# Patient Record
Sex: FEMALE | Race: WHITE | NOT HISPANIC OR LATINO
[De-identification: names, ages, dates, MRNs, and addresses within clinical notes are randomized per-mention and may not be internally consistent; named-entity substitution may affect disease eponyms.]

---

## 2017-03-30 PROBLEM — M70.61 GREATER TROCHANTERIC BURSITIS OF RIGHT HIP: Status: ACTIVE | Noted: 2017-03-30

## 2017-08-01 PROBLEM — S76.012A HIP STRAIN, LEFT, INITIAL ENCOUNTER: Status: ACTIVE | Noted: 2017-08-01

## 2018-02-08 PROCEDURE — 83970 ASSAY OF PARATHORMONE: CPT | Performed by: FAMILY MEDICINE

## 2018-02-08 PROCEDURE — 82533 TOTAL CORTISOL: CPT | Performed by: FAMILY MEDICINE

## 2018-02-23 PROBLEM — I70.0 ATHEROSCLEROSIS OF AORTA: Status: ACTIVE | Noted: 2018-02-23

## 2018-02-23 PROBLEM — I70.0 ATHEROSCLEROSIS OF AORTA (HCC): Status: ACTIVE | Noted: 2018-02-23

## 2018-02-23 PROBLEM — M50.30 DDD (DEGENERATIVE DISC DISEASE), CERVICAL: Status: ACTIVE | Noted: 2018-02-23

## 2018-02-23 PROBLEM — M54.41 ACUTE RIGHT-SIDED LOW BACK PAIN WITH RIGHT-SIDED SCIATICA: Status: ACTIVE | Noted: 2018-02-23

## 2018-03-06 PROBLEM — M54.2 NECK PAIN: Status: ACTIVE | Noted: 2018-03-06

## 2018-03-06 PROBLEM — M25.511 ACUTE PAIN OF RIGHT SHOULDER: Status: ACTIVE | Noted: 2018-03-06

## 2018-03-09 PROBLEM — G89.29 CHRONIC RIGHT-SIDED LOW BACK PAIN WITHOUT SCIATICA: Status: ACTIVE | Noted: 2018-03-09

## 2018-03-09 PROBLEM — M54.50 CHRONIC RIGHT-SIDED LOW BACK PAIN WITHOUT SCIATICA: Status: ACTIVE | Noted: 2018-03-09

## 2018-06-05 PROCEDURE — 82607 VITAMIN B-12: CPT | Performed by: FAMILY MEDICINE

## 2018-06-07 PROCEDURE — 87427 SHIGA-LIKE TOXIN AG IA: CPT | Performed by: FAMILY MEDICINE

## 2018-06-07 PROCEDURE — 87493 C DIFF AMPLIFIED PROBE: CPT | Performed by: FAMILY MEDICINE

## 2018-06-07 PROCEDURE — 89055 LEUKOCYTE ASSESSMENT FECAL: CPT | Performed by: FAMILY MEDICINE

## 2018-06-07 PROCEDURE — 87046 STOOL CULTR AEROBIC BACT EA: CPT | Performed by: FAMILY MEDICINE

## 2018-06-07 PROCEDURE — 87045 FECES CULTURE AEROBIC BACT: CPT | Performed by: FAMILY MEDICINE

## 2018-06-07 PROCEDURE — 87338 HPYLORI STOOL AG IA: CPT | Performed by: FAMILY MEDICINE

## 2018-07-11 PROBLEM — R19.7 DIARRHEA, UNSPECIFIED TYPE: Status: ACTIVE | Noted: 2018-07-11

## 2019-01-11 PROCEDURE — 87086 URINE CULTURE/COLONY COUNT: CPT | Performed by: INTERNAL MEDICINE

## 2019-03-01 PROBLEM — Z92.89 HOSPITALIZATION WITHIN LAST 30 DAYS: Status: ACTIVE | Noted: 2019-03-01

## 2019-03-01 PROBLEM — R07.2 PRECORDIAL PAIN: Status: ACTIVE | Noted: 2019-03-01

## 2019-04-19 PROBLEM — R25.1 TREMOR: Status: ACTIVE | Noted: 2019-04-19

## 2019-04-19 PROBLEM — J06.9 URTI (ACUTE UPPER RESPIRATORY INFECTION): Status: ACTIVE | Noted: 2019-04-19

## 2019-04-19 PROBLEM — F33.0 MILD EPISODE OF RECURRENT MAJOR DEPRESSIVE DISORDER: Status: ACTIVE | Noted: 2019-04-19

## 2019-04-19 PROBLEM — F33.0 MILD EPISODE OF RECURRENT MAJOR DEPRESSIVE DISORDER (HCC): Status: ACTIVE | Noted: 2019-04-19

## 2019-05-31 PROBLEM — N18.4 STAGE 4 CHRONIC KIDNEY DISEASE (HCC): Status: ACTIVE | Noted: 2019-05-31

## 2019-07-12 PROBLEM — J01.01 ACUTE RECURRENT MAXILLARY SINUSITIS: Status: ACTIVE | Noted: 2019-07-12

## 2019-07-12 PROBLEM — R29.818 ROMBERG'S TEST POSITIVE: Status: ACTIVE | Noted: 2019-07-12

## 2019-07-12 PROBLEM — R42 DIZZINESS: Status: ACTIVE | Noted: 2019-07-12

## 2019-10-09 PROBLEM — H81.11 BPPV (BENIGN PAROXYSMAL POSITIONAL VERTIGO), RIGHT: Status: ACTIVE | Noted: 2019-10-09

## 2019-10-09 PROBLEM — H90.A32 MIXED CONDUCTIVE AND SENSORINEURAL HEARING LOSS OF LEFT EAR WITH RESTRICTED HEARING OF RIGHT EAR: Status: ACTIVE | Noted: 2019-10-09

## 2021-09-03 ENCOUNTER — TELEPHONE (OUTPATIENT)
Dept: SCHEDULING | Age: 76
End: 2021-09-03

## 2021-11-03 PROBLEM — F33.41 DEPRESSION, MAJOR, RECURRENT, IN PARTIAL REMISSION: Status: ACTIVE | Noted: 2021-11-03

## 2021-11-03 PROBLEM — F13.20: Status: ACTIVE | Noted: 2021-11-03

## 2021-11-03 PROBLEM — F10.20 ALCOHOL DEPENDENCE, CONTINUOUS (HCC): Status: ACTIVE | Noted: 2021-11-03

## 2021-11-03 PROBLEM — F33.41 DEPRESSION, MAJOR, RECURRENT, IN PARTIAL REMISSION (HCC): Status: ACTIVE | Noted: 2021-11-03

## 2022-08-04 ENCOUNTER — APPOINTMENT (OUTPATIENT)
Dept: URBAN - METROPOLITAN AREA CLINIC 247 | Age: 77
Setting detail: DERMATOLOGY
End: 2022-08-04

## 2022-08-04 DIAGNOSIS — D22 MELANOCYTIC NEVI: ICD-10-CM

## 2022-08-04 DIAGNOSIS — D18.0 HEMANGIOMA: ICD-10-CM

## 2022-08-04 DIAGNOSIS — L82.1 OTHER SEBORRHEIC KERATOSIS: ICD-10-CM

## 2022-08-04 DIAGNOSIS — L81.4 OTHER MELANIN HYPERPIGMENTATION: ICD-10-CM

## 2022-08-04 DIAGNOSIS — Z71.89 OTHER SPECIFIED COUNSELING: ICD-10-CM

## 2022-08-04 PROBLEM — D18.01 HEMANGIOMA OF SKIN AND SUBCUTANEOUS TISSUE: Status: ACTIVE | Noted: 2022-08-04

## 2022-08-04 PROBLEM — D22.5 MELANOCYTIC NEVI OF TRUNK: Status: ACTIVE | Noted: 2022-08-04

## 2022-08-04 PROCEDURE — 99213 OFFICE O/P EST LOW 20 MIN: CPT

## 2022-08-04 PROCEDURE — OTHER COUNSELING: OTHER

## 2022-08-04 PROCEDURE — OTHER MIPS QUALITY: OTHER

## 2022-08-04 ASSESSMENT — LOCATION DETAILED DESCRIPTION DERM
LOCATION DETAILED: RIGHT MEDIAL UPPER BACK
LOCATION DETAILED: STERNUM
LOCATION DETAILED: LEFT MID-UPPER BACK
LOCATION DETAILED: LEFT SUPERIOR UPPER BACK

## 2022-08-04 ASSESSMENT — LOCATION SIMPLE DESCRIPTION DERM
LOCATION SIMPLE: RIGHT UPPER BACK
LOCATION SIMPLE: CHEST
LOCATION SIMPLE: LEFT UPPER BACK

## 2022-08-04 ASSESSMENT — LOCATION ZONE DERM: LOCATION ZONE: TRUNK

## 2022-11-03 ENCOUNTER — APPOINTMENT (OUTPATIENT)
Dept: URBAN - METROPOLITAN AREA CLINIC 247 | Age: 77
Setting detail: DERMATOLOGY
End: 2022-11-03

## 2022-11-03 DIAGNOSIS — R21 RASH AND OTHER NONSPECIFIC SKIN ERUPTION: ICD-10-CM

## 2022-11-03 DIAGNOSIS — L259 CONTACT DERMATITIS AND OTHER ECZEMA, UNSPECIFIED CAUSE: ICD-10-CM

## 2022-11-03 PROBLEM — L30.9 DERMATITIS, UNSPECIFIED: Status: ACTIVE | Noted: 2022-11-03

## 2022-11-03 PROCEDURE — 11102 TANGNTL BX SKIN SINGLE LES: CPT

## 2022-11-03 PROCEDURE — OTHER BIOPSY BY SHAVE METHOD: OTHER

## 2022-11-03 PROCEDURE — OTHER COUNSELING: OTHER

## 2022-11-03 PROCEDURE — OTHER PRESCRIPTION MEDICATION MANAGEMENT: OTHER

## 2022-11-03 PROCEDURE — 99213 OFFICE O/P EST LOW 20 MIN: CPT | Mod: 25

## 2022-11-03 PROCEDURE — OTHER MEDICATION COUNSELING: OTHER

## 2022-11-03 PROCEDURE — OTHER PRESCRIPTION: OTHER

## 2022-11-03 PROCEDURE — OTHER ADDITIONAL NOTES: OTHER

## 2022-11-03 RX ORDER — TRIAMCINOLONE ACETONIDE 1 MG/G
CREAM TOPICAL
Qty: 454 | Refills: 2 | Status: ERX | COMMUNITY
Start: 2022-11-03

## 2022-11-03 RX ORDER — PERMETHRIN 50 MG/G
CREAM TOPICAL
Qty: 60 | Refills: 1 | Status: ERX | COMMUNITY
Start: 2022-11-03

## 2022-11-03 ASSESSMENT — LOCATION SIMPLE DESCRIPTION DERM
LOCATION SIMPLE: RIGHT UPPER BACK
LOCATION SIMPLE: ABDOMEN
LOCATION SIMPLE: LEFT UPPER BACK
LOCATION SIMPLE: LEFT UPPER ARM
LOCATION SIMPLE: LEFT ANTERIOR NECK
LOCATION SIMPLE: LEFT BUTTOCK
LOCATION SIMPLE: RIGHT BUTTOCK
LOCATION SIMPLE: RIGHT UPPER ARM

## 2022-11-03 ASSESSMENT — LOCATION ZONE DERM
LOCATION ZONE: ARM
LOCATION ZONE: TRUNK
LOCATION ZONE: NECK

## 2022-11-03 ASSESSMENT — LOCATION DETAILED DESCRIPTION DERM
LOCATION DETAILED: RIGHT ANTERIOR DISTAL UPPER ARM
LOCATION DETAILED: LEFT BUTTOCK
LOCATION DETAILED: EPIGASTRIC SKIN
LOCATION DETAILED: LEFT MID-UPPER BACK
LOCATION DETAILED: LEFT INFERIOR ANTERIOR NECK
LOCATION DETAILED: LEFT ANTERIOR DISTAL UPPER ARM
LOCATION DETAILED: RIGHT BUTTOCK
LOCATION DETAILED: RIGHT INFERIOR MEDIAL UPPER BACK

## 2022-11-03 NOTE — PROCEDURE: BIOPSY BY SHAVE METHOD
Hide Additional Anticipated Plan?: No
Size Of Lesion In Cm: 0
Type Of Destruction Used: Curettage
Anesthesia Volume In Cc (Will Not Render If 0): 0.5
Biopsy Method: Personna blade
Silver Nitrate Text: The wound bed was treated with silver nitrate after the biopsy was performed.
Was A Bandage Applied: Yes
Post-Care Instructions: I reviewed with the patient in detail post-care instructions. Patient is to keep the biopsy site dry overnight, and then wash daily with a gentle cleanser. Afterwards, pat dry and apply Vaseline and bandage daily until healed. For optimal wound healing, apply SPF 30+ or keep covered until pigmentation fades.
Consent: Written consent was obtained and risks were reviewed. Patient was offered the consent document and offered time to review and ask any questions prior to signing.
Electrodesiccation Text: The wound bed was treated with electrodesiccation after the biopsy was performed.
Billing Type: Third-Party Bill
Curettage Text: The wound bed was treated with curettage after the biopsy was performed.
Electrodesiccation And Curettage Text: The wound bed was treated with electrodesiccation and curettage after the biopsy was performed.
Cryotherapy Text: The wound bed was treated with cryotherapy after the biopsy was performed.
Notification Instructions: Patient will be notified of biopsy results. However, patient instructed to call the office if not contacted within 2 weeks.
Dressing: bandage
Detail Level: Detailed
Wound Care: Petrolatum
Information: Selecting Yes will display possible errors in your note based on the variables you have selected. This validation is only offered as a suggestion for you. PLEASE NOTE THAT THE VALIDATION TEXT WILL BE REMOVED WHEN YOU FINALIZE YOUR NOTE. IF YOU WANT TO FAX A PRELIMINARY NOTE YOU WILL NEED TO TOGGLE THIS TO 'NO' IF YOU DO NOT WANT IT IN YOUR FAXED NOTE.
Biopsy Type: H and E
Depth Of Biopsy: dermis
Hemostasis: Drysol
Anesthesia Type: 1% lidocaine with epinephrine

## 2022-11-03 NOTE — PROCEDURE: PRESCRIPTION MEDICATION MANAGEMENT
Initiate Treatment: Permethrin 5% cream. Apply neck down at night and repeat in 1 week. \\nTAC 0.1% cream BID for 2 weeks.
Detail Level: Zone
Render In Strict Bullet Format?: No

## 2022-11-03 NOTE — PROCEDURE: ADDITIONAL NOTES
Additional Notes: Hands clear upon exam. Pt reports groin is clear as well.\\nPt tried OTC scabies treatment with minimal improvement.\\nAdvised to wash all clothes, blankets, and bedding with hot water the day after first permethrin treatment.\\nPt reports that her  also has a rash. She is aware that he will also need to do permethrin treatment (he has appt later today).
Render Risk Assessment In Note?: no
Detail Level: Zone

## 2024-03-19 PROBLEM — J06.9 URTI (ACUTE UPPER RESPIRATORY INFECTION): Status: RESOLVED | Noted: 2019-04-19 | Resolved: 2024-03-19

## 2024-04-18 ENCOUNTER — RX ONLY (RX ONLY)
Age: 79
End: 2024-04-18

## 2024-04-18 ENCOUNTER — APPOINTMENT (OUTPATIENT)
Dept: URBAN - METROPOLITAN AREA CLINIC 247 | Age: 79
Setting detail: DERMATOLOGY
End: 2024-04-18

## 2024-04-18 DIAGNOSIS — D18.0 HEMANGIOMA: ICD-10-CM

## 2024-04-18 DIAGNOSIS — L82.1 OTHER SEBORRHEIC KERATOSIS: ICD-10-CM

## 2024-04-18 DIAGNOSIS — L81.4 OTHER MELANIN HYPERPIGMENTATION: ICD-10-CM

## 2024-04-18 DIAGNOSIS — B35.3 TINEA PEDIS: ICD-10-CM

## 2024-04-18 DIAGNOSIS — D22 MELANOCYTIC NEVI: ICD-10-CM

## 2024-04-18 DIAGNOSIS — Z71.89 OTHER SPECIFIED COUNSELING: ICD-10-CM

## 2024-04-18 PROBLEM — D22.5 MELANOCYTIC NEVI OF TRUNK: Status: ACTIVE | Noted: 2024-04-18

## 2024-04-18 PROBLEM — D18.01 HEMANGIOMA OF SKIN AND SUBCUTANEOUS TISSUE: Status: ACTIVE | Noted: 2024-04-18

## 2024-04-18 PROCEDURE — OTHER PRESCRIPTION: OTHER

## 2024-04-18 PROCEDURE — OTHER COUNSELING: OTHER

## 2024-04-18 PROCEDURE — OTHER MEDICATION COUNSELING: OTHER

## 2024-04-18 PROCEDURE — 99214 OFFICE O/P EST MOD 30 MIN: CPT

## 2024-04-18 PROCEDURE — OTHER MIPS QUALITY: OTHER

## 2024-04-18 PROCEDURE — OTHER ADDITIONAL NOTES: OTHER

## 2024-04-18 PROCEDURE — OTHER PRESCRIPTION MEDICATION MANAGEMENT: OTHER

## 2024-04-18 RX ORDER — KETOCONAZOLE 20 MG/G
CREAM TOPICAL BID
Qty: 60 | Refills: 2 | Status: ERX | COMMUNITY
Start: 2024-04-18

## 2024-04-18 RX ORDER — TERBINAFINE HYDROCHLORIDE 250 MG/1
TABLET ORAL DAILY
Qty: 14 | Refills: 0 | Status: ERX | COMMUNITY
Start: 2024-04-18

## 2024-04-18 RX ORDER — KETOCONAZOLE 20 MG/G
CREAM TOPICAL
Qty: 60 | Refills: 4 | Status: ERX | COMMUNITY
Start: 2024-04-18

## 2024-04-18 ASSESSMENT — LOCATION DETAILED DESCRIPTION DERM
LOCATION DETAILED: LEFT MID-UPPER BACK
LOCATION DETAILED: LEFT DISTAL PRETIBIAL REGION
LOCATION DETAILED: EPIGASTRIC SKIN
LOCATION DETAILED: RIGHT DISTAL PRETIBIAL REGION
LOCATION DETAILED: LEFT SUPERIOR MEDIAL UPPER BACK
LOCATION DETAILED: RIGHT ANKLE
LOCATION DETAILED: RIGHT INFERIOR LATERAL MIDBACK

## 2024-04-18 ASSESSMENT — LOCATION SIMPLE DESCRIPTION DERM
LOCATION SIMPLE: RIGHT ANKLE
LOCATION SIMPLE: RIGHT PRETIBIAL REGION
LOCATION SIMPLE: ABDOMEN
LOCATION SIMPLE: RIGHT LOWER BACK
LOCATION SIMPLE: LEFT UPPER BACK
LOCATION SIMPLE: LEFT PRETIBIAL REGION

## 2024-04-18 ASSESSMENT — LOCATION ZONE DERM
LOCATION ZONE: LEG
LOCATION ZONE: TRUNK

## 2024-04-18 NOTE — PROCEDURE: PRESCRIPTION MEDICATION MANAGEMENT
Initiate Treatment: Ketoconazole 2% cream apply to legs, toes twice a day for 6 weeks. Terbinafine 250mg take 1 by mouth daily for 2 weeks
Detail Level: Zone
Render In Strict Bullet Format?: No

## 2024-04-18 NOTE — PROCEDURE: MEDICATION COUNSELING
Oxybutynin Pregnancy And Lactation Text: This medication is Pregnancy Category B and is considered safe during pregnancy. It is unknown if it is excreted in breast milk.
Cantharidin Pregnancy And Lactation Text: This medication has not been proven safe during pregnancy. It is unknown if this medication is excreted in breast milk.
Xolair Counseling:  Patient informed of potential adverse effects including but not limited to fever, muscle aches, rash and allergic reactions.  The patient verbalized understanding of the proper use and possible adverse effects of Xolair.  All of the patient's questions and concerns were addressed.
Metronidazole Counseling:  I discussed with the patient the risks of metronidazole including but not limited to seizures, nausea/vomiting, a metallic taste in the mouth, nausea/vomiting and severe allergy.
Aklief counseling:  Patient advised to apply a pea-sized amount only at bedtime and wait 30 minutes after washing their face before applying.  If too drying, patient may add a non-comedogenic moisturizer.  The most commonly reported side effects including irritation, redness, scaling, dryness, stinging, burning, itching, and increased risk of sunburn.  The patient verbalized understanding of the proper use and possible adverse effects of retinoids.  All of the patient's questions and concerns were addressed.
Opzelura Counseling:  I discussed with the patient the risks of Opzelura including but not limited to nasopharngitis, bronchitis, ear infection, eosinophila, hives, diarrhea, folliculitis, tonsillitis, and rhinorrhea.  Taken orally, this medication has been linked to serious infections; higher rate of mortality; malignancy and lymphoproliferative disorders; major adverse cardiovascular events; thrombosis; thrombocytopenia, anemia, and neutropenia; and lipid elevations.
Hydroxyzine Pregnancy And Lactation Text: This medication is not safe during pregnancy and should not be taken. It is also excreted in breast milk and breast feeding isn't recommended.
Cosentyx Counseling:  I discussed with the patient the risks of Cosentyx including but not limited to worsening of Crohn's disease, immunosuppression, allergic reactions and infections.  The patient understands that monitoring is required including a PPD at baseline and must alert us or the primary physician if symptoms of infection or other concerning signs are noted.
Nsaids Counseling: NSAID Counseling: I discussed with the patient that NSAIDs should be taken with food. Prolonged use of NSAIDs can result in the development of stomach ulcers.  Patient advised to stop taking NSAIDs if abdominal pain occurs.  The patient verbalized understanding of the proper use and possible adverse effects of NSAIDs.  All of the patient's questions and concerns were addressed.
Litfulo Pregnancy And Lactation Text: Based on animal studies, Lifulo may cause embryo-fetal harm when administered to pregnant women.  The medication should not be used in pregnancy.  Breastfeeding is not recommended during treatment.
Solaraze Counseling:  I discussed with the patient the risks of Solaraze including but not limited to erythema, scaling, itching, weeping, crusting, and pain.
Simponi Pregnancy And Lactation Text: The risk during pregnancy and breastfeeding is uncertain with this medication.
Hydroquinone Pregnancy And Lactation Text: This medication has not been assigned a Pregnancy Risk Category but animal studies failed to show danger with the topical medication. It is unknown if the medication is excreted in breast milk.
Winlevi Pregnancy And Lactation Text: This medication is considered safe during pregnancy and breastfeeding.
Bactrim Pregnancy And Lactation Text: This medication is Pregnancy Category D and is known to cause fetal risk.  It is also excreted in breast milk.
Acitretin Counseling:  I discussed with the patient the risks of acitretin including but not limited to hair loss, dry lips/skin/eyes, liver damage, hyperlipidemia, depression/suicidal ideation, photosensitivity.  Serious rare side effects can include but are not limited to pancreatitis, pseudotumor cerebri, bony changes, clot formation/stroke/heart attack.  Patient understands that alcohol is contraindicated since it can result in liver toxicity and significantly prolong the elimination of the drug by many years.
Gabapentin Pregnancy And Lactation Text: This medication is Pregnancy Category C and isn't considered safe during pregnancy. It is excreted in breast milk.
Dutasteride Female Counseling: Dutasteride Counseling:  I discussed with the patient the risks of use of dutasteride including but not limited to decreased libido and sexual dysfunction. Explained the teratogenic nature of the medication and stressed the importance of not getting pregnant during treatment. All of the patient's questions and concerns were addressed.
Valtrex Pregnancy And Lactation Text: this medication is Pregnancy Category B and is considered safe during pregnancy. This medication is not directly found in breast milk but it's metabolite acyclovir is present.
Tetracycline Counseling: Patient counseled regarding possible photosensitivity and increased risk for sunburn.  Patient instructed to avoid sunlight, if possible.  When exposed to sunlight, patients should wear protective clothing, sunglasses, and sunscreen.  The patient was instructed to call the office immediately if the following severe adverse effects occur:  hearing changes, easy bruising/bleeding, severe headache, or vision changes.  The patient verbalized understanding of the proper use and possible adverse effects of tetracycline.  All of the patient's questions and concerns were addressed. Patient understands to avoid pregnancy while on therapy due to potential birth defects.
5-Fu Counseling: 5-Fluorouracil Counseling:  I discussed with the patient the risks of 5-fluorouracil including but not limited to erythema, scaling, itching, weeping, crusting, and pain.
Methotrexate Pregnancy And Lactation Text: This medication is Pregnancy Category X and is known to cause fetal harm. This medication is excreted in breast milk.
Aklief Pregnancy And Lactation Text: It is unknown if this medication is safe to use during pregnancy.  It is unknown if this medication is excreted in breast milk.  Breastfeeding women should use the topical cream on the smallest area of the skin for the shortest time needed while breastfeeding.  Do not apply to nipple and areola.
Topical Metronidazole Counseling: Metronidazole is a topical antibiotic medication. You may experience burning, stinging, redness, or allergic reactions.  Please call our office if you develop any problems from using this medication.
Azathioprine Counseling:  I discussed with the patient the risks of azathioprine including but not limited to myelosuppression, immunosuppression, hepatotoxicity, lymphoma, and infections.  The patient understands that monitoring is required including baseline LFTs, Creatinine, possible TPMP genotyping and weekly CBCs for the first month and then every 2 weeks thereafter.  The patient verbalized understanding of the proper use and possible adverse effects of azathioprine.  All of the patient's questions and concerns were addressed.
Ilumya Counseling: I discussed with the patient the risks of tildrakizumab including but not limited to immunosuppression, malignancy, posterior leukoencephalopathy syndrome, and serious infections.  The patient understands that monitoring is required including a PPD at baseline and must alert us or the primary physician if symptoms of infection or other concerning signs are noted.
Xolair Pregnancy And Lactation Text: This medication is Pregnancy Category B and is considered safe during pregnancy. This medication is excreted in breast milk.
Ketoconazole Pregnancy And Lactation Text: This medication is Pregnancy Category C and it isn't know if it is safe during pregnancy. It is also excreted in breast milk and breast feeding isn't recommended.
Propranolol Counseling:  I discussed with the patient the risks of propranolol including but not limited to low heart rate, low blood pressure, low blood sugar, restlessness and increased cold sensitivity. They should call the office if they experience any of these side effects.
Opzelura Pregnancy And Lactation Text: There is insufficient data to evaluate drug-associated risk for major birth defects, miscarriage, or other adverse maternal or fetal outcomes.  There is a pregnancy registry that monitors pregnancy outcomes in pregnant persons exposed to the medication during pregnancy.  It is unknown if this medication is excreted in breast milk.  Do not breastfeed during treatment and for about 4 weeks after the last dose.
Cosentyx Pregnancy And Lactation Text: This medication is Pregnancy Category B and is considered safe during pregnancy. It is unknown if this medication is excreted in breast milk.
Metronidazole Pregnancy And Lactation Text: This medication is Pregnancy Category B and considered safe during pregnancy.  It is also excreted in breast milk.
Arava Counseling:  Patient counseled regarding adverse effects of Arava including but not limited to nausea, vomiting, abnormalities in liver function tests. Patients may develop mouth sores, rash, diarrhea, and abnormalities in blood counts. The patient understands that monitoring is required including LFTs and blood counts.  There is a rare possibility of scarring of the liver and lung problems that can occur when taking methotrexate. Persistent nausea, loss of appetite, pale stools, dark urine, cough, and shortness of breath should be reported immediately. Patient advised to discontinue Arava treatment and consult with a physician prior to attempting conception. The patient will have to undergo a treatment to eliminate Arava from the body prior to conception.
Imiquimod Counseling:  I discussed with the patient the risks of imiquimod including but not limited to erythema, scaling, itching, weeping, crusting, and pain.  Patient understands that the inflammatory response to imiquimod is variable from person to person and was educated regarded proper titration schedule.  If flu-like symptoms develop, patient knows to discontinue the medication and contact us.
Cephalexin Counseling: I counseled the patient regarding use of cephalexin as an antibiotic for prophylactic and/or therapeutic purposes. Cephalexin (commonly prescribed under brand name Keflex) is a cephalosporin antibiotic which is active against numerous classes of bacteria, including most skin bacteria. Side effects may include nausea, diarrhea, gastrointestinal upset, rash, hives, yeast infections, and in rare cases, hepatitis, kidney disease, seizures, fever, confusion, neurologic symptoms, and others. Patients with severe allergies to penicillin medications are cautioned that there is about a 10% incidence of cross-reactivity with cephalosporins. When possible, patients with penicillin allergies should use alternatives to cephalosporins for antibiotic therapy.
Olumiant Counseling: I discussed with the patient the risks of Olumiant therapy including but not limited to upper respiratory tract infections, shingles, cold sores, and nausea. Live vaccines should be avoided.  This medication has been linked to serious infections; higher rate of mortality; malignancy and lymphoproliferative disorders; major adverse cardiovascular events; thrombosis; gastrointestinal perforations; neutropenia; lymphopenia; anemia; liver enzyme elevations; and lipid elevations.
Tetracycline Pregnancy And Lactation Text: This medication is Pregnancy Category D and not consider safe during pregnancy. It is also excreted in breast milk.
VTAMA Counseling: I discussed with the patient that VTAMA is not for use in the eyes, mouth or mouth. They should call the office if they develop any signs of allergic reactions to VTAMA. The patient verbalized understanding of the proper use and possible adverse effects of VTAMA.  All of the patient's questions and concerns were addressed.
Nsaids Pregnancy And Lactation Text: These medications are considered safe up to 30 weeks gestation. It is excreted in breast milk.
Skyrizi Counseling: I discussed with the patient the risks of risankizumab-rzaa including but not limited to immunosuppression, and serious infections.  The patient understands that monitoring is required including a PPD at baseline and must alert us or the primary physician if symptoms of infection or other concerning signs are noted.
Albendazole Counseling:  I discussed with the patient the risks of albendazole including but not limited to cytopenia, kidney damage, nausea/vomiting and severe allergy.  The patient understands that this medication is being used in an off-label manner.
Solaraze Pregnancy And Lactation Text: This medication is Pregnancy Category B and is considered safe. There is some data to suggest avoiding during the third trimester. It is unknown if this medication is excreted in breast milk.
Acitretin Pregnancy And Lactation Text: This medication is Pregnancy Category X and should not be given to women who are pregnant or may become pregnant in the future. This medication is excreted in breast milk.
Use Enhanced Medication Counseling?: No
Glycopyrrolate Counseling:  I discussed with the patient the risks of glycopyrrolate including but not limited to skin rash, drowsiness, dry mouth, difficulty urinating, and blurred vision.
Dutasteride Pregnancy And Lactation Text: This medication is absolutely contraindicated in women, especially during pregnancy and breast feeding. Feminization of male fetuses is possible if taking while pregnant.
Picato Counseling:  I discussed with the patient the risks of Picato including but not limited to erythema, scaling, itching, weeping, crusting, and pain.
Prednisone Counseling:  I discussed with the patient the risks of prolonged use of prednisone including but not limited to weight gain, insomnia, osteoporosis, mood changes, diabetes, susceptibility to infection, glaucoma and high blood pressure.  In cases where prednisone use is prolonged, patients should be monitored with blood pressure checks, serum glucose levels and an eye exam.  Additionally, the patient may need to be placed on GI prophylaxis, PCP prophylaxis, and calcium and vitamin D supplementation and/or a bisphosphonate.  The patient verbalized understanding of the proper use and the possible adverse effects of prednisone.  All of the patient's questions and concerns were addressed.
5-Fu Pregnancy And Lactation Text: This medication is Pregnancy Category X and contraindicated in pregnancy and in women who may become pregnant. It is unknown if this medication is excreted in breast milk.
Minocycline Counseling: Patient advised regarding possible photosensitivity and discoloration of the teeth, skin, lips, tongue and gums.  Patient instructed to avoid sunlight, if possible.  When exposed to sunlight, patients should wear protective clothing, sunglasses, and sunscreen.  The patient was instructed to call the office immediately if the following severe adverse effects occur:  hearing changes, easy bruising/bleeding, severe headache, or vision changes.  The patient verbalized understanding of the proper use and possible adverse effects of minocycline.  All of the patient's questions and concerns were addressed.
Azelaic Acid Counseling: Patient counseled that medicine may cause skin irritation and to avoid applying near the eyes.  In the event of skin irritation, the patient was advised to reduce the amount of the drug applied or use it less frequently.   The patient verbalized understanding of the proper use and possible adverse effects of azelaic acid.  All of the patient's questions and concerns were addressed.
Propranolol Pregnancy And Lactation Text: This medication is Pregnancy Category C and it isn't known if it is safe during pregnancy. It is excreted in breast milk.
Topical Metronidazole Pregnancy And Lactation Text: This medication is Pregnancy Category B and considered safe during pregnancy.  It is also considered safe to use while breastfeeding.
Azathioprine Pregnancy And Lactation Text: This medication is Pregnancy Category D and isn't considered safe during pregnancy. It is unknown if this medication is excreted in breast milk.
Terbinafine Counseling: Patient counseling regarding adverse effects of terbinafine including but not limited to headache, diarrhea, rash, upset stomach, liver function test abnormalities, itching, taste/smell disturbance, nausea, abdominal pain, and flatulence.  There is a rare possibility of liver failure that can occur when taking terbinafine.  The patient understands that a baseline LFT and kidney function test may be required. The patient verbalized understanding of the proper use and possible adverse effects of terbinafine.  All of the patient's questions and concerns were addressed.
Arava Pregnancy And Lactation Text: This medication is Pregnancy Category X and is absolutely contraindicated during pregnancy. It is unknown if it is excreted in breast milk.
Olanzapine Counseling- I discussed with the patient the common side effects of olanzapine including but are not limited to: lack of energy, dry mouth, increased appetite, sleepiness, tremor, constipation, dizziness, changes in behavior, or restlessness.  Explained that teenagers are more likely to experience headaches, abdominal pain, pain in the arms or legs, tiredness, and sleepiness.  Serious side effects include but are not limited: increased risk of death in elderly patients who are confused, have memory loss, or dementia-related psychosis; hyperglycemia; increased cholesterol and triglycerides; and weight gain.
Soolantra Counseling: I discussed with the patients the risks of topial Soolantra. This is a medicine which decreases the number of mites and inflammation in the skin. You experience burning, stinging, eye irritation or allergic reactions.  Please call our office if you develop any problems from using this medication.
Dupixent Counseling: I discussed with the patient the risks of dupilumab including but not limited to eye inflammation and irritation, cold sores, injection site reactions, allergic reactions and increased risk of parasitic infection. The patient understands that monitoring is required and they must alert us or the primary physician if symptoms of infection or other concerning signs are noted.
Vtama Pregnancy And Lactation Text: It is unknown if this medication can cause problems during pregnancy and breastfeeding.
Cephalexin Pregnancy And Lactation Text: This medication is Pregnancy Category B and considered safe during pregnancy.  It is also excreted in breast milk but can be used safely for shorter doses.
Erivedge Counseling- I discussed with the patient the risks of Erivedge including but not limited to nausea, vomiting, diarrhea, constipation, weight loss, changes in the sense of taste, decreased appetite, muscle spasms, and hair loss.  The patient verbalized understanding of the proper use and possible adverse effects of Erivedge.  All of the patient's questions and concerns were addressed.
Imiquimod Pregnancy And Lactation Text: This medication is Pregnancy Category C. It is unknown if this medication is excreted in breast milk.
Olumiant Pregnancy And Lactation Text: Based on animal studies, Olumiant may cause embryo-fetal harm when administered to pregnant women.  The medication should not be used in pregnancy.  Breastfeeding is not recommended during treatment.
Albendazole Pregnancy And Lactation Text: This medication is Pregnancy Category C and it isn't known if it is safe during pregnancy. It is also excreted in breast milk.
Drysol Counseling:  I discussed with the patient the risks of drysol/aluminum chloride including but not limited to skin rash, itching, irritation, burning.
Prednisone Pregnancy And Lactation Text: This medication is Pregnancy Category C and it isn't know if it is safe during pregnancy. This medication is excreted in breast milk.
Terbinafine Pregnancy And Lactation Text: This medication is Pregnancy Category B and is considered safe during pregnancy. It is also excreted in breast milk and breast feeding isn't recommended.
Finasteride Male Counseling: Finasteride Counseling:  I discussed with the patient the risks of use of finasteride including but not limited to decreased libido, decreased ejaculate volume, gynecomastia, and depression. Women should not handle medication.  All of the patient's questions and concerns were addressed.
Bexarotene Counseling:  I discussed with the patient the risks of bexarotene including but not limited to hair loss, dry lips/skin/eyes, liver abnormalities, hyperlipidemia, pancreatitis, depression/suicidal ideation, photosensitivity, drug rash/allergic reactions, hypothyroidism, anemia, leukopenia, infection, cataracts, and teratogenicity.  Patient understands that they will need regular blood tests to check lipid profile, liver function tests, white blood cell count, thyroid function tests and pregnancy test if applicable.
Detail Level: Zone
Infliximab Counseling:  I discussed with the patient the risks of infliximab including but not limited to myelosuppression, immunosuppression, autoimmune hepatitis, demyelinating diseases, lymphoma, and serious infections.  The patient understands that monitoring is required including a PPD at baseline and must alert us or the primary physician if symptoms of infection or other concerning signs are noted.
Topical Steroids Counseling: I discussed with the patient that prolonged use of topical steroids can result in the increased appearance of superficial blood vessels (telangiectasias), lightening (hypopigmentation) and thinning of the skin (atrophy).  Patient understands to avoid using high potency steroids in skin folds, the groin or the face.  The patient verbalized understanding of the proper use and possible adverse effects of topical steroids.  All of the patient's questions and concerns were addressed.
Glycopyrrolate Pregnancy And Lactation Text: This medication is Pregnancy Category B and is considered safe during pregnancy. It is unknown if it is excreted breast milk.
Clofazimine Counseling:  I discussed with the patient the risks of clofazimine including but not limited to skin and eye pigmentation, liver damage, nausea/vomiting, gastrointestinal bleeding and allergy.
Azelaic Acid Pregnancy And Lactation Text: This medication is considered safe during pregnancy and breast feeding.
SSKI Counseling:  I discussed with the patient the risks of SSKI including but not limited to thyroid abnormalities, metallic taste, GI upset, fever, headache, acne, arthralgias, paraesthesias, lymphadenopathy, easy bleeding, arrhythmias, and allergic reaction.
Cellcept Counseling:  I discussed with the patient the risks of mycophenolate mofetil including but not limited to infection/immunosuppression, GI upset, hypokalemia, hypercholesterolemia, bone marrow suppression, lymphoproliferative disorders, malignancy, GI ulceration/bleed/perforation, colitis, interstitial lung disease, kidney failure, progressive multifocal leukoencephalopathy, and birth defects.  The patient understands that monitoring is required including a baseline creatinine and regular CBC testing. In addition, patient must alert us immediately if symptoms of infection or other concerning signs are noted.
Ivermectin Counseling:  Patient instructed to take medication on an empty stomach with a full glass of water.  Patient informed of potential adverse effects including but not limited to nausea, diarrhea, dizziness, itching, and swelling of the extremities or lymph nodes.  The patient verbalized understanding of the proper use and possible adverse effects of ivermectin.  All of the patient's questions and concerns were addressed.
Soolantra Pregnancy And Lactation Text: This medication is Pregnancy Category C. This medication is considered safe during breast feeding.
Fluconazole Counseling:  Patient counseled regarding adverse effects of fluconazole including but not limited to headache, diarrhea, nausea, upset stomach, liver function test abnormalities, taste disturbance, and stomach pain.  There is a rare possibility of liver failure that can occur when taking fluconazole.  The patient understands that monitoring of LFTs and kidney function test may be required, especially at baseline. The patient verbalized understanding of the proper use and possible adverse effects of fluconazole.  All of the patient's questions and concerns were addressed.
Dupixent Pregnancy And Lactation Text: This medication likely crosses the placenta but the risk for the fetus is uncertain. This medication is excreted in breast milk.
Stelara Counseling:  I discussed with the patient the risks of ustekinumab including but not limited to immunosuppression, malignancy, posterior leukoencephalopathy syndrome, and serious infections.  The patient understands that monitoring is required including a PPD at baseline and must alert us or the primary physician if symptoms of infection or other concerning signs are noted.
Klisyri Counseling:  I discussed with the patient the risks of Klisyri including but not limited to erythema, scaling, itching, weeping, crusting, and pain.
Olanzapine Pregnancy And Lactation Text: This medication is pregnancy category C.   There are no adequate and well controlled trials with olanzapine in pregnant females.  Olanzapine should be used during pregnancy only if the potential benefit justifies the potential risk to the fetus.   In a study in lactating healthy women, olanzapine was excreted in breast milk.  It is recommended that women taking olanzapine should not breast feed.
Clindamycin Counseling: I counseled the patient regarding use of clindamycin as an antibiotic for prophylactic and/or therapeutic purposes. Clindamycin is active against numerous classes of bacteria, including skin bacteria. Side effects may include nausea, diarrhea, gastrointestinal upset, rash, hives, yeast infections, and in rare cases, colitis.
Zoryve Counseling:  I discussed with the patient that Zoryve is not for use in the eyes, mouth or vagina. The most commonly reported side effects include diarrhea, headache, insomnia, application site pain, upper respiratory tract infections, and urinary tract infections.  All of the patient's questions and concerns were addressed.
Rinvoq Counseling: I discussed with the patient the risks of Rinvoq therapy including but not limited to upper respiratory tract infections, shingles, cold sores, bronchitis, nausea, cough, fever, acne, and headache. Live vaccines should be avoided.  This medication has been linked to serious infections; higher rate of mortality; malignancy and lymphoproliferative disorders; major adverse cardiovascular events; thrombosis; thrombocytopenia, anemia, and neutropenia; lipid elevations; liver enzyme elevations; and gastrointestinal perforations.
Benzoyl Peroxide Counseling: Patient counseled that medicine may cause skin irritation and bleach clothing.  In the event of skin irritation, the patient was advised to reduce the amount of the drug applied or use it less frequently.   The patient verbalized understanding of the proper use and possible adverse effects of benzoyl peroxide.  All of the patient's questions and concerns were addressed.
Topical Steroids Applications Pregnancy And Lactation Text: Most topical steroids are considered safe to use during pregnancy and lactation.  Any topical steroid applied to the breast or nipple should be washed off before breastfeeding.
Hydroxychloroquine Counseling:  I discussed with the patient that a baseline ophthalmologic exam is needed at the start of therapy and every year thereafter while on therapy. A CBC may also be warranted for monitoring.  The side effects of this medication were discussed with the patient, including but not limited to agranulocytosis, aplastic anemia, seizures, rashes, retinopathy, and liver toxicity. Patient instructed to call the office should any adverse effect occur.  The patient verbalized understanding of the proper use and possible adverse effects of Plaquenil.  All the patient's questions and concerns were addressed.
Finasteride Female Counseling: Finasteride Counseling:  I discussed with the patient the risks of use of finasteride including but not limited to decreased libido and sexual dysfunction. Explained the teratogenic nature of the medication and stressed the importance of not getting pregnant during treatment. All of the patient's questions and concerns were addressed.
Bexarotene Pregnancy And Lactation Text: This medication is Pregnancy Category X and should not be given to women who are pregnant or may become pregnant. This medication should not be used if you are breast feeding.
Protopic Counseling: Patient may experience a mild burning sensation during topical application. Protopic is not approved in children less than 2 years of age. There have been case reports of hematologic and skin malignancies in patients using topical calcineurin inhibitors although causality is questionable.
Sski Pregnancy And Lactation Text: This medication is Pregnancy Category D and isn't considered safe during pregnancy. It is excreted in breast milk.
Fluconazole Pregnancy And Lactation Text: This medication is Pregnancy Category C and it isn't know if it is safe during pregnancy. It is also excreted in breast milk.
Klisyri Pregnancy And Lactation Text: It is unknown if this medication can harm a developing fetus or if it is excreted in breast milk.
Cimetidine Counseling:  I discussed with the patient the risks of Cimetidine including but not limited to gynecomastia, headache, diarrhea, nausea, drowsiness, arrhythmias, pancreatitis, skin rashes, psychosis, bone marrow suppression and kidney toxicity.
Cibinqo Counseling: I discussed with the patient the risks of Cibinqo therapy including but not limited to common cold, nausea, headache, cold sores, increased blood CPK levels, dizziness, UTIs, fatigue, acne, and vomitting. Live vaccines should be avoided.  This medication has been linked to serious infections; higher rate of mortality; malignancy and lymphoproliferative disorders; major adverse cardiovascular events; thrombosis; thrombocytopenia and lymphopenia; lipid elevations; and retinal detachment.
Quinolones Counseling:  I discussed with the patient the risks of fluoroquinolones including but not limited to GI upset, allergic reaction, drug rash, diarrhea, dizziness, photosensitivity, yeast infections, liver function test abnormalities, tendonitis/tendon rupture.
Enbrel Counseling:  I discussed with the patient the risks of etanercept including but not limited to myelosuppression, immunosuppression, autoimmune hepatitis, demyelinating diseases, lymphoma, and infections.  The patient understands that monitoring is required including a PPD at baseline and must alert us or the primary physician if symptoms of infection or other concerning signs are noted.
Oral Minoxidil Counseling- I discussed with the patient the risks of oral minoxidil including but not limited to shortness of breath, swelling of the feet or ankles, dizziness, lightheadedness, unwanted hair growth and allergic reaction.  The patient verbalized understanding of the proper use and possible adverse effects of oral minoxidil.  All of the patient's questions and concerns were addressed.
Clindamycin Pregnancy And Lactation Text: This medication can be used in pregnancy if certain situations. Clindamycin is also present in breast milk.
Rinvoq Pregnancy And Lactation Text: Based on animal studies, Rinvoq may cause embryo-fetal harm when administered to pregnant women.  The medication should not be used in pregnancy.  Breastfeeding is not recommended during treatment and for 6 days after the last dose.
Libtayo Counseling- I discussed with the patient the risks of Libtayo including but not limited to nausea, vomiting, diarrhea, and bone or muscle pain.  The patient verbalized understanding of the proper use and possible adverse effects of Libtayo.  All of the patient's questions and concerns were addressed.
Topical Retinoid counseling:  Patient advised to apply a pea-sized amount only at bedtime and wait 30 minutes after washing their face before applying.  If too drying, patient may add a non-comedogenic moisturizer. The patient verbalized understanding of the proper use and possible adverse effects of retinoids.  All of the patient's questions and concerns were addressed.
Hydroxychloroquine Pregnancy And Lactation Text: This medication has been shown to cause fetal harm but it isn't assigned a Pregnancy Risk Category. There are small amounts excreted in breast milk.
Isotretinoin Counseling: Patient should get monthly blood tests, not donate blood, not drive at night if vision affected, not share medication, and not undergo elective surgery for 6 months after tx completed. Side effects reviewed, pt to contact office should one occur.
Protopic Pregnancy And Lactation Text: This medication is Pregnancy Category C. It is unknown if this medication is excreted in breast milk when applied topically.
Finasteride Pregnancy And Lactation Text: This medication is absolutely contraindicated during pregnancy. It is unknown if it is excreted in breast milk.
Topical Sulfur Applications Counseling: Topical Sulfur Counseling: Patient counseled that this medication may cause skin irritation or allergic reactions.  In the event of skin irritation, the patient was advised to reduce the amount of the drug applied or use it less frequently.   The patient verbalized understanding of the proper use and possible adverse effects of topical sulfur application.  All of the patient's questions and concerns were addressed.
Elidel Counseling: Patient may experience a mild burning sensation during topical application. Elidel is not approved in children less than 2 years of age. There have been case reports of hematologic and skin malignancies in patients using topical calcineurin inhibitors although causality is questionable.
Thalidomide Counseling: I discussed with the patient the risks of thalidomide including but not limited to birth defects, anxiety, weakness, chest pain, dizziness, cough and severe allergy.
Rituxan Counseling:  I discussed with the patient the risks of Rituxan infusions. Side effects can include infusion reactions, severe drug rashes including mucocutaneous reactions, reactivation of latent hepatitis and other infections and rarely progressive multifocal leukoencephalopathy.  All of the patient's questions and concerns were addressed.
Benzoyl Peroxide Pregnancy And Lactation Text: This medication is Pregnancy Category C. It is unknown if benzoyl peroxide is excreted in breast milk.
Cyclophosphamide Counseling:  I discussed with the patient the risks of cyclophosphamide including but not limited to hair loss, hormonal abnormalities, decreased fertility, abdominal pain, diarrhea, nausea and vomiting, bone marrow suppression and infection. The patient understands that monitoring is required while taking this medication.
Griseofulvin Counseling:  I discussed with the patient the risks of griseofulvin including but not limited to photosensitivity, cytopenia, liver damage, nausea/vomiting and severe allergy.  The patient understands that this medication is best absorbed when taken with a fatty meal (e.g., ice cream or french fries).
Minoxidil Counseling: Minoxidil is a topical medication which can increase blood flow where it is applied. It is uncertain how this medication increases hair growth. Side effects are uncommon and include stinging and allergic reactions.
Colchicine Counseling:  Patient counseled regarding adverse effects including but not limited to stomach upset (nausea, vomiting, stomach pain, or diarrhea).  Patient instructed to limit alcohol consumption while taking this medication.  Colchicine may reduce blood counts especially with prolonged use.  The patient understands that monitoring of kidney function and blood counts may be required, especially at baseline. The patient verbalized understanding of the proper use and possible adverse effects of colchicine.  All of the patient's questions and concerns were addressed.
Tazorac Pregnancy And Lactation Text: This medication is not safe during pregnancy. It is unknown if this medication is excreted in breast milk.
Oral Minoxidil Pregnancy And Lactation Text: This medication should only be used when clearly needed if you are pregnant, attempting to become pregnant or breast feeding.
Cibinqo Pregnancy And Lactation Text: It is unknown if this medication will adversely affect pregnancy or breast feeding.  You should not take this medication if you are currently pregnant or planning a pregnancy or while breastfeeding.
Zyclara Counseling:  I discussed with the patient the risks of imiquimod including but not limited to erythema, scaling, itching, weeping, crusting, and pain.  Patient understands that the inflammatory response to imiquimod is variable from person to person and was educated regarded proper titration schedule.  If flu-like symptoms develop, patient knows to discontinue the medication and contact us.
Taltz Counseling: I discussed with the patient the risks of ixekizumab including but not limited to immunosuppression, serious infections, worsening of inflammatory bowel disease and drug reactions.  The patient understands that monitoring is required including a PPD at baseline and must alert us or the primary physician if symptoms of infection or other concerning signs are noted.
Doxycycline Counseling:  Patient counseled regarding possible photosensitivity and increased risk for sunburn.  Patient instructed to avoid sunlight, if possible.  When exposed to sunlight, patients should wear protective clothing, sunglasses, and sunscreen.  The patient was instructed to call the office immediately if the following severe adverse effects occur:  hearing changes, easy bruising/bleeding, severe headache, or vision changes.  The patient verbalized understanding of the proper use and possible adverse effects of doxycycline.  All of the patient's questions and concerns were addressed.
Sotyktu Counseling:  I discussed the most common side effects of Sotyktu including: common cold, sore throat, sinus infections, cold sores, canker sores, folliculitis, and acne.  I also discussed more serious side effects of Sotyktu including but not limited to: serious allergic reactions; increased risk for infections such as TB; cancers such as lymphomas; rhabdomyolysis and elevated CPK; and elevated triglycerides and liver enzymes. 
Libtayo Pregnancy And Lactation Text: This medication is contraindicated in pregnancy and when breast feeding.
Isotretinoin Pregnancy And Lactation Text: This medication is Pregnancy Category X and is considered extremely dangerous during pregnancy. It is unknown if it is excreted in breast milk.
Rituxan Pregnancy And Lactation Text: This medication is Pregnancy Category C and it isn't know if it is safe during pregnancy. It is unknown if this medication is excreted in breast milk but similar antibodies are known to be excreted.
Low Dose Naltrexone Counseling- I discussed with the patient the potential risks and side effects of low dose naltrexone including but not limited to: more vivid dreams, headaches, nausea, vomiting, abdominal pain, fatigue, dizziness, and anxiety.
Birth Control Pills Counseling: Birth Control Pill Counseling: I discussed with the patient the potential side effects of OCPs including but not limited to increased risk of stroke, heart attack, thrombophlebitis, deep venous thrombosis, hepatic adenomas, breast changes, GI upset, headaches, and depression.  The patient verbalized understanding of the proper use and possible adverse effects of OCPs. All of the patient's questions and concerns were addressed.
Topical Sulfur Applications Pregnancy And Lactation Text: This medication is considered safe during pregnancy and breast feeding secondary to limited systemic absorption.
Qbrexza Counseling:  I discussed with the patient the risks of Qbrexza including but not limited to headache, mydriasis, blurred vision, dry eyes, nasal dryness, dry mouth, dry throat, dry skin, urinary hesitation, and constipation.  Local skin reactions including erythema, burning, stinging, and itching can also occur.
Adbry Counseling: I discussed with the patient the risks of tralokinumab including but not limited to eye infection and irritation, cold sores, injection site reactions, worsening of asthma, allergic reactions and increased risk of parasitic infection.  Live vaccines should be avoided while taking tralokinumab. The patient understands that monitoring is required and they must alert us or the primary physician if symptoms of infection or other concerning signs are noted.
Cyclophosphamide Pregnancy And Lactation Text: This medication is Pregnancy Category D and it isn't considered safe during pregnancy. This medication is excreted in breast milk.
Rifampin Counseling: I discussed with the patient the risks of rifampin including but not limited to liver damage, kidney damage, red-orange body fluids, nausea/vomiting and severe allergy.
Carac Counseling:  I discussed with the patient the risks of Carac including but not limited to erythema, scaling, itching, weeping, crusting, and pain.
Topical Clindamycin Counseling: Patient counseled that this medication may cause skin irritation or allergic reactions.  In the event of skin irritation, the patient was advised to reduce the amount of the drug applied or use it less frequently.   The patient verbalized understanding of the proper use and possible adverse effects of clindamycin.  All of the patient's questions and concerns were addressed.
Humira Counseling:  I discussed with the patient the risks of adalimumab including but not limited to myelosuppression, immunosuppression, autoimmune hepatitis, demyelinating diseases, lymphoma, and serious infections.  The patient understands that monitoring is required including a PPD at baseline and must alert us or the primary physician if symptoms of infection or other concerning signs are noted.
Griseofulvin Pregnancy And Lactation Text: This medication is Pregnancy Category X and is known to cause serious birth defects. It is unknown if this medication is excreted in breast milk but breast feeding should be avoided.
Otezla Counseling: The side effects of Otezla were discussed with the patient, including but not limited to worsening or new depression, weight loss, diarrhea, nausea, upper respiratory tract infection, and headache. Patient instructed to call the office should any adverse effect occur.  The patient verbalized understanding of the proper use and possible adverse effects of Otezla.  All the patient's questions and concerns were addressed.
Doxepin Counseling:  Patient advised that the medication is sedating and not to drive a car after taking this medication. Patient informed of potential adverse effects including but not limited to dry mouth, urinary retention, and blurry vision.  The patient verbalized understanding of the proper use and possible adverse effects of doxepin.  All of the patient's questions and concerns were addressed.
Tazorac Counseling:  Patient advised that medication is irritating and drying.  Patient may need to apply sparingly and wash off after an hour before eventually leaving it on overnight.  The patient verbalized understanding of the proper use and possible adverse effects of tazorac.  All of the patient's questions and concerns were addressed.
Doxycycline Pregnancy And Lactation Text: This medication is Pregnancy Category D and not consider safe during pregnancy. It is also excreted in breast milk but is considered safe for shorter treatment courses.
Sotyktu Pregnancy And Lactation Text: There is insufficient data to evaluate whether or not Sotyktu is safe to use during pregnancy.   It is not known if Sotyktu passes into breast milk and whether or not it is safe to use when breastfeeding.  
Odomzo Counseling- I discussed with the patient the risks of Odomzo including but not limited to nausea, vomiting, diarrhea, constipation, weight loss, changes in the sense of taste, decreased appetite, muscle spasms, and hair loss.  The patient verbalized understanding of the proper use and possible adverse effects of Odomzo.  All of the patient's questions and concerns were addressed.
Low Dose Naltrexone Pregnancy And Lactation Text: Naltrexone is pregnancy category C.  There have been no adequate and well-controlled studies in pregnant women.  It should be used in pregnancy only if the potential benefit justifies the potential risk to the fetus.   Limited data indicates that naltrexone is minimally excreted into breastmilk.
Eucrisa Counseling: Patient may experience a mild burning sensation during topical application. Eucrisa is not approved in children less than 3 months of age.
Siliq Counseling:  I discussed with the patient the risks of Siliq including but not limited to new or worsening depression, suicidal thoughts and behavior, immunosuppression, malignancy, posterior leukoencephalopathy syndrome, and serious infections.  The patient understands that monitoring is required including a PPD at baseline and must alert us or the primary physician if symptoms of infection or other concerning signs are noted. There is also a special program designed to monitor depression which is required with Siliq.
Wartpeel Counseling:  I discussed with the patient the risks of Wartpeel including but not limited to erythema, scaling, itching, weeping, crusting, and pain.
Tranexamic Acid Counseling:  Patient advised of the small risk of bleeding problems with tranexamic acid. They were also instructed to call if they developed any nausea, vomiting or diarrhea. All of the patient's questions and concerns were addressed.
Birth Control Pills Pregnancy And Lactation Text: This medication should be avoided if pregnant and for the first 30 days post-partum.
Qbrexza Pregnancy And Lactation Text: There is no available data on Qbrexza use in pregnant women.  There is no available data on Qbrexza use in lactation.
High Dose Vitamin A Counseling: Side effects reviewed, pt to contact office should one occur.
Azithromycin Counseling:  I discussed with the patient the risks of azithromycin including but not limited to GI upset, allergic reaction, drug rash, diarrhea, and yeast infections.
Doxepin Pregnancy And Lactation Text: This medication is Pregnancy Category C and it isn't known if it is safe during pregnancy. It is also excreted in breast milk and breast feeding isn't recommended.
Dapsone Counseling: I discussed with the patient the risks of dapsone including but not limited to hemolytic anemia, agranulocytosis, rashes, methemoglobinemia, kidney failure, peripheral neuropathy, headaches, GI upset, and liver toxicity.  Patients who start dapsone require monitoring including baseline LFTs and weekly CBCs for the first month, then every month thereafter.  The patient verbalized understanding of the proper use and possible adverse effects of dapsone.  All of the patient's questions and concerns were addressed.
Cyclosporine Counseling:  I discussed with the patient the risks of cyclosporine including but not limited to hypertension, gingival hyperplasia,myelosuppression, immunosuppression, liver damage, kidney damage, neurotoxicity, lymphoma, and serious infections. The patient understands that monitoring is required including baseline blood pressure, CBC, CMP, lipid panel and uric acid, and then 1-2 times monthly CMP and blood pressure.
Rifampin Pregnancy And Lactation Text: This medication is Pregnancy Category C and it isn't know if it is safe during pregnancy. It is also excreted in breast milk and should not be used if you are breast feeding.
Adbry Pregnancy And Lactation Text: It is unknown if this medication will adversely affect pregnancy or breast feeding.
Otezla Pregnancy And Lactation Text: This medication is Pregnancy Category C and it isn't known if it is safe during pregnancy. It is unknown if it is excreted in breast milk.
Itraconazole Counseling:  I discussed with the patient the risks of itraconazole including but not limited to liver damage, nausea/vomiting, neuropathy, and severe allergy.  The patient understands that this medication is best absorbed when taken with acidic beverages such as non-diet cola or ginger ale.  The patient understands that monitoring is required including baseline LFTs and repeat LFTs at intervals.  The patient understands that they are to contact us or the primary physician if concerning signs are noted.
Xeljanz Counseling: I discussed with the patient the risks of Xeljanz therapy including increased risk of infection, liver issues, headache, diarrhea, or cold symptoms. Live vaccines should be avoided. They were instructed to call if they have any problems.
Tremfya Counseling: I discussed with the patient the risks of guselkumab including but not limited to immunosuppression, serious infections, and drug reactions.  The patient understands that monitoring is required including a PPD at baseline and must alert us or the primary physician if symptoms of infection or other concerning signs are noted.
Cimzia Counseling:  I discussed with the patient the risks of Cimzia including but not limited to immunosuppression, allergic reactions and infections.  The patient understands that monitoring is required including a PPD at baseline and must alert us or the primary physician if symptoms of infection or other concerning signs are noted.
Erythromycin Counseling:  I discussed with the patient the risks of erythromycin including but not limited to GI upset, allergic reaction, drug rash, diarrhea, increase in liver enzymes, and yeast infections.
Mirvaso Counseling: Mirvaso is a topical medication which can decrease superficial blood flow where applied. Side effects are uncommon and include stinging, redness and allergic reactions.
Niacinamide Counseling: I recommended taking niacin or niacinamide, also know as vitamin B3, twice daily. Recent evidence suggests that taking vitamin B3 (500 mg twice daily) can reduce the risk of actinic keratoses and non-melanoma skin cancers. Side effects of vitamin B3 include flushing and headache.
Azithromycin Pregnancy And Lactation Text: This medication is considered safe during pregnancy and is also secreted in breast milk.
Sarecycline Counseling: Patient advised regarding possible photosensitivity and discoloration of the teeth, skin, lips, tongue and gums.  Patient instructed to avoid sunlight, if possible.  When exposed to sunlight, patients should wear protective clothing, sunglasses, and sunscreen.  The patient was instructed to call the office immediately if the following severe adverse effects occur:  hearing changes, easy bruising/bleeding, severe headache, or vision changes.  The patient verbalized understanding of the proper use and possible adverse effects of sarecycline.  All of the patient's questions and concerns were addressed.
Calcipotriene Counseling:  I discussed with the patient the risks of calcipotriene including but not limited to erythema, scaling, itching, and irritation.
Opioid Counseling: I discussed with the patient the potential side effects of opioids including but not limited to addiction, altered mental status, and depression. I stressed avoiding alcohol, benzodiazepines, muscle relaxants and sleep aids unless specifically okayed by a physician. The patient verbalized understanding of the proper use and possible adverse effects of opioids. All of the patient's questions and concerns were addressed. They were instructed to flush the remaining pills down the toilet if they did not need them for pain.
High Dose Vitamin A Pregnancy And Lactation Text: High dose vitamin A therapy is contraindicated during pregnancy and breast feeding.
Spironolactone Counseling: Patient advised regarding risks of diarrhea, abdominal pain, hyperkalemia, birth defects (for female patients), liver toxicity and renal toxicity. The patient may need blood work to monitor liver and kidney function and potassium levels while on therapy. The patient verbalized understanding of the proper use and possible adverse effects of spironolactone.  All of the patient's questions and concerns were addressed.
Rhofade Counseling: Rhofade is a topical medication which can decrease superficial blood flow where applied. Side effects are uncommon and include stinging, redness and allergic reactions.
Bimzelx Counseling:  I discussed with the patient the risks of Bimzelx including but not limited to depression, immunosuppression, allergic reactions and infections.  The patient understands that monitoring is required including a PPD at baseline and must alert us or the primary physician if symptoms of infection or other concerning signs are noted.
Tranexamic Acid Pregnancy And Lactation Text: It is unknown if this medication is safe during pregnancy or breast feeding.
Hyrimoz Counseling:  I discussed with the patient the risks of adalimumab including but not limited to myelosuppression, immunosuppression, autoimmune hepatitis, demyelinating diseases, lymphoma, and serious infections.  The patient understands that monitoring is required including a PPD at baseline and must alert us or the primary physician if symptoms of infection or other concerning signs are noted.
Topical Ketoconazole Counseling: Patient counseled that this medication may cause skin irritation or allergic reactions.  In the event of skin irritation, the patient was advised to reduce the amount of the drug applied or use it less frequently.   The patient verbalized understanding of the proper use and possible adverse effects of ketoconazole.  All of the patient's questions and concerns were addressed.
Dapsone Pregnancy And Lactation Text: This medication is Pregnancy Category C and is not considered safe during pregnancy or breast feeding.
Mirvaso Pregnancy And Lactation Text: This medication has not been assigned a Pregnancy Risk Category. It is unknown if the medication is excreted in breast milk.
Hydroxyzine Counseling: Patient advised that the medication is sedating and not to drive a car after taking this medication.  Patient informed of potential adverse effects including but not limited to dry mouth, urinary retention, and blurry vision.  The patient verbalized understanding of the proper use and possible adverse effects of hydroxyzine.  All of the patient's questions and concerns were addressed.
Oxybutynin Counseling:  I discussed with the patient the risks of oxybutynin including but not limited to skin rash, drowsiness, dry mouth, difficulty urinating, and blurred vision.
Erythromycin Pregnancy And Lactation Text: This medication is Pregnancy Category B and is considered safe during pregnancy. It is also excreted in breast milk.
Xelsaraz Pregnancy And Lactation Text: This medication is Pregnancy Category D and is not considered safe during pregnancy.  The risk during breast feeding is also uncertain.
Hydroquinone Counseling:  Patient advised that medication may result in skin irritation, lightening (hypopigmentation), dryness, and burning.  In the event of skin irritation, the patient was advised to reduce the amount of the drug applied or use it less frequently.  Rarely, spots that are treated with hydroquinone can become darker (pseudoochronosis).  Should this occur, patient instructed to stop medication and call the office. The patient verbalized understanding of the proper use and possible adverse effects of hydroquinone.  All of the patient's questions and concerns were addressed.
Calcipotriene Pregnancy And Lactation Text: The use of this medication during pregnancy or lactation is not recommended as there is insufficient data.
Cimzia Pregnancy And Lactation Text: This medication crosses the placenta but can be considered safe in certain situations. Cimzia may be excreted in breast milk.
Niacinamide Pregnancy And Lactation Text: These medications are considered safe during pregnancy.
Bactrim Counseling:  I discussed with the patient the risks of sulfa antibiotics including but not limited to GI upset, allergic reaction, drug rash, diarrhea, dizziness, photosensitivity, and yeast infections.  Rarely, more serious reactions can occur including but not limited to aplastic anemia, agranulocytosis, methemoglobinemia, blood dyscrasias, liver or kidney failure, lung infiltrates or desquamative/blistering drug rashes.
Spironolactone Pregnancy And Lactation Text: This medication can cause feminization of the male fetus and should be avoided during pregnancy. The active metabolite is also found in breast milk.
Bimzelx Pregnancy And Lactation Text: This medication crosses the placenta and the safety is uncertain during pregnancy. It is unknown if this medication is present in breast milk.
Litfulo Counseling: I discussed with the patient the risks of Litfulo therapy including but not limited to upper respiratory tract infections, shingles, cold sores, and nausea. Live vaccines should be avoided.  This medication has been linked to serious infections; higher rate of mortality; malignancy and lymphoproliferative disorders; major adverse cardiovascular events; thrombosis; gastrointestinal perforations; neutropenia; lymphopenia; anemia; liver enzyme elevations; and lipid elevations.
Opioid Pregnancy And Lactation Text: These medications can lead to premature delivery and should be avoided during pregnancy. These medications are also present in breast milk in small amounts.
Valtrex Counseling: I discussed with the patient the risks of valacyclovir including but not limited to kidney damage, nausea, vomiting and severe allergy.  The patient understands that if the infection seems to be worsening or is not improving, they are to call.
Simponi Counseling:  I discussed with the patient the risks of golimumab including but not limited to myelosuppression, immunosuppression, autoimmune hepatitis, demyelinating diseases, lymphoma, and serious infections.  The patient understands that monitoring is required including a PPD at baseline and must alert us or the primary physician if symptoms of infection or other concerning signs are noted.
Winlevi Counseling:  I discussed with the patient the risks of topical clascoterone including but not limited to erythema, scaling, itching, and stinging. Patient voiced their understanding.
Cantharidin Counseling:  I discussed with the patient the risks of Cantharidin including but not limited to pain, redness, burning, itching, and blistering.
Methotrexate Counseling:  Patient counseled regarding adverse effects of methotrexate including but not limited to nausea, vomiting, abnormalities in liver function tests. Patients may develop mouth sores, rash, diarrhea, and abnormalities in blood counts. The patient understands that monitoring is required including LFT's and blood counts.  There is a rare possibility of scarring of the liver and lung problems that can occur when taking methotrexate. Persistent nausea, loss of appetite, pale stools, dark urine, cough, and shortness of breath should be reported immediately. Patient advised to discontinue methotrexate treatment at least three months before attempting to become pregnant.  I discussed the need for folate supplements while taking methotrexate.  These supplements can decrease side effects during methotrexate treatment. The patient verbalized understanding of the proper use and possible adverse effects of methotrexate.  All of the patient's questions and concerns were addressed.
Gabapentin Counseling: I discussed with the patient the risks of gabapentin including but not limited to dizziness, somnolence, fatigue and ataxia.
Dutasteride Male Counseling: Dustasteride Counseling:  I discussed with the patient the risks of use of dutasteride including but not limited to decreased libido, decreased ejaculate volume, and gynecomastia. Women who can become pregnant should not handle medication.  All of the patient's questions and concerns were addressed.
Ketoconazole Counseling:   Patient counseled regarding improving absorption with orange juice.  Adverse effects include but are not limited to breast enlargement, headache, diarrhea, nausea, upset stomach, liver function test abnormalities, taste disturbance, and stomach pain.  There is a rare possibility of liver failure that can occur when taking ketoconazole. The patient understands that monitoring of LFTs may be required, especially at baseline. The patient verbalized understanding of the proper use and possible adverse effects of ketoconazole.  All of the patient's questions and concerns were addressed.

## 2024-04-18 NOTE — PROCEDURE: MIPS QUALITY
Quality 226: Preventive Care And Screening: Tobacco Use: Screening And Cessation Intervention: Patient screened for tobacco use and is an ex/non-smoker
Quality 47: Advance Care Plan: Advance care planning not documented, reason not otherwise specified.
Quality 358: Patient-Centered Surgical Risk Assessment And Communication: A patient-specific risk assessment with a risk calculator was not completed or communicated to patient and/or family.
Detail Level: Detailed

## 2024-04-18 NOTE — PROCEDURE: ADDITIONAL NOTES
Additional Notes: Tried and failed OTC Econazole(2014)
Render Risk Assessment In Note?: no
Detail Level: Simple

## 2024-05-28 ENCOUNTER — APPOINTMENT (OUTPATIENT)
Dept: URBAN - METROPOLITAN AREA CLINIC 247 | Age: 79
Setting detail: DERMATOLOGY
End: 2024-05-28

## 2024-05-28 ENCOUNTER — RX ONLY (RX ONLY)
Age: 79
End: 2024-05-28

## 2024-05-28 DIAGNOSIS — B35.3 TINEA PEDIS: ICD-10-CM

## 2024-05-28 PROCEDURE — OTHER COUNSELING: OTHER

## 2024-05-28 PROCEDURE — OTHER ADDITIONAL NOTES: OTHER

## 2024-05-28 PROCEDURE — OTHER MEDICATION COUNSELING: OTHER

## 2024-05-28 PROCEDURE — 99213 OFFICE O/P EST LOW 20 MIN: CPT

## 2024-05-28 PROCEDURE — OTHER PRESCRIPTION MEDICATION MANAGEMENT: OTHER

## 2024-05-28 RX ORDER — KETOCONAZOLE 20 MG/G
CREAM TOPICAL
Qty: 60 | Refills: 2 | Status: ERX | COMMUNITY
Start: 2024-05-28

## 2024-05-28 ASSESSMENT — LOCATION ZONE DERM: LOCATION ZONE: LEG

## 2024-05-28 ASSESSMENT — LOCATION SIMPLE DESCRIPTION DERM
LOCATION SIMPLE: RIGHT PRETIBIAL REGION
LOCATION SIMPLE: LEFT PRETIBIAL REGION
LOCATION SIMPLE: RIGHT ANKLE

## 2024-05-28 ASSESSMENT — LOCATION DETAILED DESCRIPTION DERM
LOCATION DETAILED: RIGHT DISTAL PRETIBIAL REGION
LOCATION DETAILED: LEFT DISTAL PRETIBIAL REGION
LOCATION DETAILED: RIGHT ANKLE

## 2024-05-28 NOTE — PROCEDURE: PRESCRIPTION MEDICATION MANAGEMENT
Continue Regimen: Ketoconazole 2% cream apply to legs, toes twice a day for 6 weeks (RF sent)
Detail Level: Zone
Render In Strict Bullet Format?: No

## 2024-05-28 NOTE — PROCEDURE: ADDITIONAL NOTES
Additional Notes: Tried and failed OTC Econazole(2014)\\nPatient did not get oral Terbinafine due to drug interaction with warfarin.\\nPatient advised to use antifungal powder (like Zasorb) and cover with socks.\\nPatient advised to do a water and vinegar soak nightly.
Render Risk Assessment In Note?: no
Detail Level: Simple

## 2024-06-04 ENCOUNTER — RX ONLY (RX ONLY)
Age: 79
End: 2024-06-04

## 2024-06-04 RX ORDER — KETOCONAZOLE 20 MG/G
CREAM TOPICAL
Qty: 60 | Refills: 2 | Status: ERX | COMMUNITY
Start: 2024-06-04

## 2024-11-13 ENCOUNTER — HOSPITAL ENCOUNTER (OUTPATIENT)
Dept: GENERAL RADIOLOGY | Age: 79
Discharge: HOME | End: 2024-11-13
Attending: INTERNAL MEDICINE
Payer: MEDICARE

## 2024-11-13 DIAGNOSIS — I25.10 CAD (CORONARY ARTERY DISEASE): ICD-10-CM

## 2024-11-13 DIAGNOSIS — Z82.49 FAMILY HISTORY OF CABG: ICD-10-CM

## 2024-11-13 DIAGNOSIS — I48.91 AFIB (HCC): ICD-10-CM

## 2024-11-13 DIAGNOSIS — I10 HTN (HYPERTENSION): ICD-10-CM

## 2024-11-13 PROCEDURE — 71046 X-RAY EXAM CHEST 2 VIEWS: CPT | Performed by: INTERNAL MEDICINE

## 2024-11-14 ENCOUNTER — LAB ENCOUNTER (OUTPATIENT)
Dept: LAB | Age: 79
End: 2024-11-14
Attending: INTERNAL MEDICINE
Payer: MEDICARE

## 2024-11-14 DIAGNOSIS — I10 PRIMARY HYPERTENSION: ICD-10-CM

## 2024-11-14 DIAGNOSIS — R93.1 DECREASED CARDIAC EJECTION FRACTION: ICD-10-CM

## 2024-11-14 DIAGNOSIS — E11.9 TYPE 2 DIABETES MELLITUS WITHOUT COMPLICATION, WITHOUT LONG-TERM CURRENT USE OF INSULIN (HCC): ICD-10-CM

## 2024-11-14 LAB
ANION GAP SERPL CALC-SCNC: 3 MMOL/L (ref 0–18)
BASOPHILS # BLD AUTO: 0.03 X10(3) UL (ref 0–0.2)
BASOPHILS NFR BLD AUTO: 0.4 %
BUN BLD-MCNC: 24 MG/DL (ref 9–23)
CALCIUM BLD-MCNC: 9.6 MG/DL (ref 8.7–10.4)
CHLORIDE SERPL-SCNC: 110 MMOL/L (ref 98–112)
CO2 SERPL-SCNC: 27 MMOL/L (ref 21–32)
CREAT BLD-MCNC: 1.06 MG/DL
EGFRCR SERPLBLD CKD-EPI 2021: 53 ML/MIN/1.73M2 (ref 60–?)
EOSINOPHIL # BLD AUTO: 0.03 X10(3) UL (ref 0–0.7)
EOSINOPHIL NFR BLD AUTO: 0.4 %
ERYTHROCYTE [DISTWIDTH] IN BLOOD BY AUTOMATED COUNT: 16.5 %
FASTING STATUS PATIENT QL REPORTED: NO
GLUCOSE BLD-MCNC: 103 MG/DL (ref 70–99)
HCT VFR BLD AUTO: 41.5 %
HGB BLD-MCNC: 12.7 G/DL
IMM GRANULOCYTES # BLD AUTO: 0.07 X10(3) UL (ref 0–1)
IMM GRANULOCYTES NFR BLD: 0.9 %
LYMPHOCYTES # BLD AUTO: 1.21 X10(3) UL (ref 1–4)
LYMPHOCYTES NFR BLD AUTO: 14.7 %
MCH RBC QN AUTO: 30.8 PG (ref 26–34)
MCHC RBC AUTO-ENTMCNC: 30.6 G/DL (ref 31–37)
MCV RBC AUTO: 100.7 FL
MONOCYTES # BLD AUTO: 0.88 X10(3) UL (ref 0.1–1)
MONOCYTES NFR BLD AUTO: 10.7 %
NEUTROPHILS # BLD AUTO: 6 X10 (3) UL (ref 1.5–7.7)
NEUTROPHILS # BLD AUTO: 6 X10(3) UL (ref 1.5–7.7)
NEUTROPHILS NFR BLD AUTO: 72.9 %
OSMOLALITY SERPL CALC.SUM OF ELEC: 294 MOSM/KG (ref 275–295)
PLATELET # BLD AUTO: 248 10(3)UL (ref 150–450)
POTASSIUM SERPL-SCNC: 3.9 MMOL/L (ref 3.5–5.1)
RBC # BLD AUTO: 4.12 X10(6)UL
SODIUM SERPL-SCNC: 140 MMOL/L (ref 136–145)
WBC # BLD AUTO: 8.2 X10(3) UL (ref 4–11)

## 2024-11-14 PROCEDURE — 85025 COMPLETE CBC W/AUTO DIFF WBC: CPT

## 2024-11-14 PROCEDURE — 80048 BASIC METABOLIC PNL TOTAL CA: CPT

## 2024-11-14 PROCEDURE — 36415 COLL VENOUS BLD VENIPUNCTURE: CPT

## 2024-11-14 RX ORDER — METOPROLOL SUCCINATE 100 MG/1
100 TABLET, EXTENDED RELEASE ORAL 2 TIMES DAILY
COMMUNITY

## 2024-11-14 RX ORDER — WARFARIN SODIUM 5 MG/1
5 TABLET ORAL DAILY
COMMUNITY

## 2024-11-14 RX ORDER — PREGABALIN 50 MG/1
50 CAPSULE ORAL 2 TIMES DAILY
COMMUNITY

## 2024-11-14 RX ORDER — ALPRAZOLAM 0.25 MG/1
0.25 TABLET ORAL
COMMUNITY

## 2024-11-14 RX ORDER — BUPROPION HYDROCHLORIDE 150 MG/1
150 TABLET, EXTENDED RELEASE ORAL DAILY
COMMUNITY

## 2024-11-14 NOTE — PAT NURSING NOTE
PAT call with patient. The following instructions were given and sent through he My Chart. Questions were answered and she verbalized understanding.     PreOp Instructions    You are scheduled for: a Cardiac Procedure    Date of Procedure: 11/18/24    Diet Instructions: Do not eat or drink anything after midnight including gum, mints, candy, etc.    Medications: Medications you are allowed to take can be taken with a sip of water the morning of your procedure    Do not take the following Blood Thinner(s): Last dose of Warfarin is to be 11/14    Medications to Stop: Hold herbal supplements and vitamins    Other Medications: Do not take Furosemide and Jardiance the morning of he procedure    Diabetic Instructions: Do not take morning dose of your diabetic medications (Jardiance)    Skin Prep : Shower with antibacterial soap using a clean washcloth, prior to procedure. Once dried off, no lotions/powders/creams/ointments, etc., Do not shave the procedure area, this will be completed at the hospital during the preparation phase.    Arrival Time: The day prior to your procedure you will receive a phone call before 6:00 pm with your arrival time. If you haven't received a phone call, please check your voicemail messages., If you did not receive a voice mail and it is after 6:00 pm, please call the nursing supervisor at [phone].    Driving After Procedure: Sedation will be given so you WILL NOT be able to drive home. You will need a responsible adult  to drive you home. You can NOT take uber or taxi unless approved by your physician in advance.    Discharge Teaching: Any questions, please call the physician's office, Your nurse will give you specific instructions before discharge      [place] parking is available starting at 6 am or park in the [place] parking garage at [facility]. Check in at the [facility] reception desk. Our  will be there to check you in for your procedure.  Please bring your insurance cards and ID with you.

## 2024-11-18 ENCOUNTER — HOSPITAL ENCOUNTER (OUTPATIENT)
Dept: INTERVENTIONAL RADIOLOGY/VASCULAR | Facility: HOSPITAL | Age: 79
Discharge: HOME | End: 2024-11-18
Attending: INTERNAL MEDICINE | Admitting: INTERNAL MEDICINE
Payer: MEDICARE

## 2024-11-18 VITALS
RESPIRATION RATE: 13 BRPM | HEIGHT: 62 IN | SYSTOLIC BLOOD PRESSURE: 109 MMHG | HEART RATE: 112 BPM | WEIGHT: 160 LBS | TEMPERATURE: 97 F | OXYGEN SATURATION: 93 % | BODY MASS INDEX: 29.44 KG/M2 | DIASTOLIC BLOOD PRESSURE: 88 MMHG

## 2024-11-18 DIAGNOSIS — I51.9 LV DYSFUNCTION: ICD-10-CM

## 2024-11-18 DIAGNOSIS — Z01.810 PRE-OPERATIVE CARDIOVASCULAR EXAMINATION: ICD-10-CM

## 2024-11-18 DIAGNOSIS — I10 PRIMARY HYPERTENSION: ICD-10-CM

## 2024-11-18 DIAGNOSIS — E11.9 TYPE 2 DIABETES MELLITUS WITHOUT COMPLICATION, WITHOUT LONG-TERM CURRENT USE OF INSULIN (HCC): Primary | ICD-10-CM

## 2024-11-18 DIAGNOSIS — R93.1 DECREASED CARDIAC EJECTION FRACTION: ICD-10-CM

## 2024-11-18 LAB
GLUCOSE BLD-MCNC: 93 MG/DL (ref 70–99)
INR: 1.3 (ref 0.8–1.3)
ISTAT ACTIVATED CLOTTING TIME: 244 SECONDS (ref 74–137)
ISTAT ACTIVATED CLOTTING TIME: 293 SECONDS (ref 74–137)

## 2024-11-18 PROCEDURE — 93010 ELECTROCARDIOGRAM REPORT: CPT | Performed by: INTERNAL MEDICINE

## 2024-11-18 PROCEDURE — 99152 MOD SED SAME PHYS/QHP 5/>YRS: CPT | Performed by: INTERNAL MEDICINE

## 2024-11-18 PROCEDURE — 92972 PERQ TRLUML CORONRY LITHOTRP: CPT | Performed by: INTERNAL MEDICINE

## 2024-11-18 PROCEDURE — B21F1ZZ FLUOROSCOPY OF OTHER BYPASS GRAFT USING LOW OSMOLAR CONTRAST: ICD-10-PCS | Performed by: INTERNAL MEDICINE

## 2024-11-18 PROCEDURE — 02F03ZZ FRAGMENTATION IN CORONARY ARTERY, ONE ARTERY, PERCUTANEOUS APPROACH: ICD-10-PCS | Performed by: INTERNAL MEDICINE

## 2024-11-18 PROCEDURE — 85347 COAGULATION TIME ACTIVATED: CPT

## 2024-11-18 PROCEDURE — 027135Z DILATION OF CORONARY ARTERY, TWO ARTERIES WITH TWO DRUG-ELUTING INTRALUMINAL DEVICES, PERCUTANEOUS APPROACH: ICD-10-PCS | Performed by: INTERNAL MEDICINE

## 2024-11-18 PROCEDURE — B2111ZZ FLUOROSCOPY OF MULTIPLE CORONARY ARTERIES USING LOW OSMOLAR CONTRAST: ICD-10-PCS | Performed by: INTERNAL MEDICINE

## 2024-11-18 PROCEDURE — 4A023N7 MEASUREMENT OF CARDIAC SAMPLING AND PRESSURE, LEFT HEART, PERCUTANEOUS APPROACH: ICD-10-PCS | Performed by: INTERNAL MEDICINE

## 2024-11-18 PROCEDURE — 93459 L HRT ART/GRFT ANGIO: CPT | Performed by: INTERNAL MEDICINE

## 2024-11-18 PROCEDURE — B2151ZZ FLUOROSCOPY OF LEFT HEART USING LOW OSMOLAR CONTRAST: ICD-10-PCS | Performed by: INTERNAL MEDICINE

## 2024-11-18 PROCEDURE — 99211 OFF/OP EST MAY X REQ PHY/QHP: CPT

## 2024-11-18 PROCEDURE — 82962 GLUCOSE BLOOD TEST: CPT

## 2024-11-18 PROCEDURE — 93005 ELECTROCARDIOGRAM TRACING: CPT

## 2024-11-18 PROCEDURE — 85610 PROTHROMBIN TIME: CPT | Performed by: INTERNAL MEDICINE

## 2024-11-18 RX ORDER — IOPAMIDOL 755 MG/ML
100 INJECTION, SOLUTION INTRAVASCULAR
Status: COMPLETED | OUTPATIENT
Start: 2024-11-18 | End: 2024-11-18

## 2024-11-18 RX ORDER — ASPIRIN 81 MG/1
81 TABLET ORAL DAILY
Status: DISCONTINUED | OUTPATIENT
Start: 2024-11-19 | End: 2024-11-18

## 2024-11-18 RX ORDER — ATORVASTATIN CALCIUM 40 MG/1
40 TABLET, FILM COATED ORAL NIGHTLY
Qty: 90 TABLET | Refills: 3 | Status: SHIPPED | OUTPATIENT
Start: 2024-11-18

## 2024-11-18 RX ORDER — CLOPIDOGREL BISULFATE 75 MG/1
75 TABLET ORAL DAILY
Qty: 90 TABLET | Refills: 1 | Status: SHIPPED | OUTPATIENT
Start: 2024-11-19

## 2024-11-18 RX ORDER — ASPIRIN 81 MG/1
TABLET, CHEWABLE ORAL
Status: COMPLETED
Start: 2024-11-18 | End: 2024-11-18

## 2024-11-18 RX ORDER — ASPIRIN 81 MG/1
81 TABLET ORAL DAILY
Qty: 30 TABLET | Refills: 0 | Status: SHIPPED | OUTPATIENT
Start: 2024-11-19

## 2024-11-18 RX ORDER — CLOPIDOGREL BISULFATE 75 MG/1
75 TABLET ORAL DAILY
Status: DISCONTINUED | OUTPATIENT
Start: 2024-11-19 | End: 2024-11-18

## 2024-11-18 RX ORDER — SODIUM CHLORIDE 9 MG/ML
INJECTION, SOLUTION INTRAVENOUS
Status: DISCONTINUED | OUTPATIENT
Start: 2024-11-19 | End: 2024-11-18 | Stop reason: HOSPADM

## 2024-11-18 RX ORDER — MIDAZOLAM HYDROCHLORIDE 1 MG/ML
INJECTION INTRAMUSCULAR; INTRAVENOUS
Status: COMPLETED
Start: 2024-11-18 | End: 2024-11-18

## 2024-11-18 RX ORDER — LIDOCAINE HYDROCHLORIDE 10 MG/ML
INJECTION, SOLUTION EPIDURAL; INFILTRATION; INTRACAUDAL; PERINEURAL
Status: COMPLETED
Start: 2024-11-18 | End: 2024-11-18

## 2024-11-18 RX ORDER — ASPIRIN 81 MG/1
324 TABLET, CHEWABLE ORAL ONCE
Status: COMPLETED | OUTPATIENT
Start: 2024-11-18 | End: 2024-11-18

## 2024-11-18 RX ORDER — SODIUM CHLORIDE 9 MG/ML
INJECTION, SOLUTION INTRAVENOUS CONTINUOUS
Status: DISCONTINUED | OUTPATIENT
Start: 2024-11-18 | End: 2024-11-18

## 2024-11-18 RX ORDER — HEPARIN SODIUM 5000 [USP'U]/ML
INJECTION, SOLUTION INTRAVENOUS; SUBCUTANEOUS
Status: COMPLETED
Start: 2024-11-18 | End: 2024-11-18

## 2024-11-18 RX ORDER — CLOPIDOGREL BISULFATE 75 MG/1
TABLET ORAL
Status: COMPLETED
Start: 2024-11-18 | End: 2024-11-18

## 2024-11-18 RX ADMIN — ASPIRIN 324 MG: 81 TABLET, CHEWABLE ORAL at 08:45:00

## 2024-11-18 RX ADMIN — IOPAMIDOL 240 ML: 755 INJECTION, SOLUTION INTRAVASCULAR at 11:30:00

## 2024-11-18 NOTE — DIETARY NOTE
Clinical Nutrition    Dietitian consult received per cardiac rehab standing order. Pt to be educated by cardiac rehab staff and encouraged to attend outpatient classes taught by RD. RD available PRN.    Juany Jaimes MS, RD, LDN  Clinical Dietitian  Ext: [phone]

## 2024-11-18 NOTE — PROCEDURES
[facility]   part of [facility]    Cardiac Cath Procedure Note  Kera Reed Patient Status:  Outpatient    1945 MRN [id number]   Location [facility] INTERVENTIONAL SUITES Attending Mac Hernández MD   Hosp Day # 0 PCP Mary Magallanes MD       Cardiologist: Deena Gomez MD  Primary Proceduralist: Deena Gomez MD  Procedure Performed: LHC, COR, and Intravascular Lithotripsy of the LM, PCI to LM and mid LAD  Date of Procedure: 2024   Indication: ischemic cariomyopathy    Consent:   Informed written consent was obtained from the patient after risk benefits and alternative explained the patient.  Patient agreed to proceed    Sedation  IV conscious sedation was achieved with Versed and fentanyl.  It was administered under my direct supervision.  Hemodynamic monitoring took place throughout the entire procedure by myself in the Cath Lab staff.        Description of the procedure: After written informed consent was obtained from the patient, patient was brought to the cardiac catheterization laboratory in a stable condition and fasting state.  Patient was prepped and draped in the usual sterile fashion.  IV conscious sedation was achieved with Versed and fentanyl.  Lidocaine 1% was used to infiltrate the right groin followed by access to the right femoral artery using modified Seldinger technique and micropuncture method.  A 6 [place] sheath was inserted.  We then advanced a diagnostic JL 4 and JR4 catheter into the ascending aorta and used to engage the left and right coronary system.  Images of the left and right coronary systems were obtained using nonionic contrast media.  The JR4 catheter was used across aortic valve.  Left ventricular pressures were obtained.  Using pullback technique a transaortic valvular gram was obtained.  Using the JR4 catheter we were able to selectively engage the vein graft to the diagonal and left circumflex arteries.  Multiple images of the vein grafts were obtained.   We then turned and directed the JR4 catheter into the left subclavian artery.  2 images of the left subclavian artery were obtained using contrast media.  We then proceeded with intervention of the left main and LAD.  The catheters were then exchanged for an EBU 4.06 [place] guide catheter which was used to engage the left main.  We then advanced an 014 run-through wire into the distal LAD followed by a 3.0 semicompliant balloon into the left main and inflated nominal pressure.  There is considerable waist.  That balloon was then exchanged for a guide liner which was placed into the left main fracture support.  Multiple attempts were made advancing the IVUS catheter however due to the calcifications of the left main we had difficulty passing the IVUS catheter through.  The catheter was then exchanged for a 3.5 shockwave lithotripsy balloon which was placed in the left main.  Multiple pulsations were administered to the left main achieving excellent results.  We were able then to advance the guide liner over the balloon into the proximal LAD.  Shockwave balloon was then exchanged for a 3.0 semicompliant balloon inflated to mid LAD.  Balloon was then exchanged for a 3.0 x 15 mm Xience stent which was inflated nominal pressure.  We then exchanged for 3.0 noncompliant balloon inflated to post dilate the stent.  The balloon was then exchanged for a 3.5 noncompliant balloon which was inflated into the left main proximal LAD.  Balloon was then exchanged for 3.5 x 15 mm [place] drug-eluting stent which was delivered at nominal pressure.  The balloon was then exchanged for a 3.5 noncompliant balloon and used to post dilate the stent.  Subsequent angiography showed excellent angiographic results with reduction of 90% distal left main ostial 80 stenosis to 0% residual, SHEILA-3 flow SHEILA-3 flow as well as 90% mid LAD stenosis reduced to 0% residual, SHEILA-3 flow to SHEILA-3 flow.  The wire was removed and the catheter was disengaged  removed from the body.  Using Angio-Seal device we achieved hemostasis after sheath removal.  Heparin was the anticoag of choice used in the procedure achieving appropriate ACT    Specimen sent to: No specimen collected  Estimated blood loss: 30 cc  Closure:  angioseal    Left Ventriculography and hemodynamics:   LV EF 35-40% by echo  LV EDP 28mmHg  No gradient across aortic valve        Coronary Angiography  RCA:  proximal 30% stenosis, remainderof vessel mild irregularities    Left main:  distal heavily calcified 90% stenosis    Left anterior descending: Ostial 90% stenosis calcified,, mid 90% calcified stenosis    Circumflex: Chronic total occlusion    SVG to D1: slow sluggish flow, native diag mild irregularities,  proximal    SVG  to LCx: ostial 30% stenosis, distal Cx mild irregularities, LPDA mild irregularities    SVG to RCA: occluded    Left Subclavian: ostial 80% heavily calcified stenosis, LIMA atretic    Assessment:  Successful percutaneous intervention to the distal left main ostial LAD with a 3.5 x 15 mm [place] drug-eluting stent reducing 90% stenosis 0 to residual, SHEILA-3 flow SHEILA-3 flow  Successful percutaneous intervention to the mid LAD with 3.0 x 15 mm Xience drug-eluting stent reducing 90% stenosis 0% residual, SHEILA-3 flow to SHEILA-3 flow  Intravascular lithotripsy to the distal left main ostial LAD        Recommendations:  Antiplatelet therapy with aspirin and Plavix.  Patient currently on Coumadin.  Continue triple therapy at least 1 month duration and then discontinue aspirin  High intensity statin  Phase 2 cardiac rehab      Deena Gomez MD  11/18/24

## 2024-11-18 NOTE — PROGRESS NOTES
Pt s/p C with PCI via right femoral artery with Dr. Gomez. Right groin site soft with no evidence of bleeding, right pedal pulse palpable. VSS. Pt awake and tolerating PO. EKG done. Cardiac rehab at bedside for new consult. After recovery completed, pt voided and ambulated in hallway, groin remains soft and dressing is c/d/I. Discharge instructions reviewed with patient, copy given and all questions answered. Stents cards given to patient. New prescriptions for atorvastatin and plavix sent to patients pharmacy. IV removed. Pt discharged via wheelchair to [facility],  to drive patient home.

## 2024-11-18 NOTE — DISCHARGE INSTRUCTIONS
HOME CARE INSTRUCTIONS FOLLOWING CORONARY ANGIOGRAPHY,  PERIPHERAL ANGIOGRAPHY, ANGIOPLASTY (PTCA/PTA) OR INSERTION  OF STENT IN THE CORONARY, CAROTID, AND/OR PERIPHERAL ARTERIES      Activity:   DO NOT drive after the procedure. You may resume driving late the following day according to the nurse or physician’s instructions   Plan on resting and relaxing tonight and tomorrow   Resume your normal activity after 48 hours, or as instructed by your physician   Do not lift anything over 10 pounds for the next 24 hours   Avoid sexual activity for the next 24 hours   Avoid drinking alcohol for the next 24 hours   If the groin site was used, avoid repeated stair use and excessive walking for the next 24 hours    What is Normal?   A small lump at the procedure site associated with mild tenderness when touched   The procedure site may be bruised or discolored   There may be a small amount of drainage on the bandage    Special Instructions:   Drink plenty of fluids during the next 24 hours to “flush” the contrast from your system   The bandage is to remain in place for 24 hours   Keep the bandage clean and dry   DO NOT submerge the procedure site for 72 hours (no bath tubs or pools).    After 24 hours, you must remove the bandage   You should shower after removing the bandage, and wash the procedure site gently with soap and water   If you choose to wear a bandage for a few days, make sure it remains clean and dry and that it is changed daily    When you should NOTIFY YOUR PHYSICIAN:   Bleeding can occur at the procedure site - both on the outside of the skin and/or beneath the surface of the skin   Swelling or a large lump at the procedure site can occur, which may be accompanied by moderate to severe pain. If either of the above occurs, lie down flat. Have someone apply pressure to the procedure site with both hands, as instructed by the nurse. Hold pressure for 20 minutes and the bleeding should stop. Notify your physician  of the occurrence. If the bleeding does not stop, call 911 and continue to apply pressure   If you experience signs of a fever, temperature >101 degrees, chills, infection (redness, swelling, thick yellow drainage, or a foul odor from the procedure site)   If you notice any numbness, tingling, or loss of feeling to your leg or foot for groin access    If you Received a Stent:  - You will remain on an antiplatelet drug and/or aspirin. Antiplatelet medications are usually taken for six months to one year and should not be stopped unless your cardiologist directs you to do so. These medications help to prevent blockage at the stent site. If another physician or dentist asks you to stop your antiplatelet medication, you need to consult your cardiologist first. Together, your cardiologist and other physician can discuss the risks that may be involved if you are not taking the antiplatelet medication.   - If a MRI is necessary, it may be done 4-6 weeks after your procedure. Verify this with your cardiologist.  - Keep the stent card with you at all times! If you need a MRI in the future, your stent card will need to be shown to the technologist before performing the MRI. A duplicate card CANNOT be reproduced.     Other:  - You may resume your present diet, unless otherwise specified by your physician.   - You may resume all of your medications as prescribed, unless otherwise directed by your physician. A list of your medications was provided to you at discharge.  - Please call your physician's office for a follow-up appointment. You should be seen in 2 weeks.  - Do not make any personal/business decisions and/or sign any legal documents for the next 24 hours.

## 2024-11-18 NOTE — CARDIAC REHAB
-FINAL PATHOLOGIC DIAGNOSIS  Endometrium, biopsy:  - Minute fragment of benign ectocervical tissue.  - No endometrium identified.  - See comment.  COMMENT: There is no endometrium identified within this biopsy.    NEEDS D&C H-SCOPE    PT HERE WITH NO BLEEDING X 3 YEARS AND STARTED BLEEDING YESTERDAY. SOME CRAMPS. NOT HEAVY. NO CLOTS.  HAS  SX'S IN GENERAL ARE SEVERE.     7/11/2018    TSH 1.108   hCG Quant <1.2   FSH 36.20   Prolactin 42.2 (H)      07/11/18 U/S  FINDINGS:  There is poor visualization of the uterus and pelvic structures due to patient body habitus and on transabdominal imaging incomplete bladder distension.  The uterus is enlarged in size and measures 12.8 x 6.8 x 7.8 cm.  The endometrial stripe is not visualized.  The ovaries are not visualized.   Impression      Nonvisualization of the ovaries.  Limited evaluation of the uterus demonstrates enlargement.     ROS:  GENERAL: No fever, chills, fatigability or weight loss.  VULVAR: No pain, no lesions and no itching.  VAGINAL: No relaxation, no itching, no discharge, no abnormal bleeding and no lesions.  ABDOMEN: No abdominal pain. Denies nausea. Denies vomiting. No diarrhea. No constipation  BREAST: Denies pain. No lumps. No discharge.  URINARY: No incontinence, no nocturia, no frequency and no dysuria.  CARDIOVASCULAR: No chest pain. No shortness of breath. No leg cramps.  NEUROLOGICAL: No headaches. No vision changes.  The remainder of the review of systems was negative.    PE:  General Appearance: obese And Well developed. Well nourished. In no acute distress.  Vulva: Lesions: No.  Urethral Meatus: Normal size. Normal location. No lesions. No prolapse.  Urethra: No masses. No tenderness. No prolapse. No scarring.  Bladder: No masses. No tenderness.  Vagina: Mucosa NI:yes discharge no, atrophy no, cystocele no or rectocele no.  MOD MENSES  Cervix: Lesion: no  Stenotic: yes Cervical motion tenderness: no  Uterus:  UTD BMI  Adnexa: UTD BMI  Abdomen:  Cardiac rehab education completed with patient and   Stent cards given to patient  Patient referred to cardiac rehab  Patient to call for appt   UTD BMI  CHEST: CTA B  HEART: RRR  EXT: NO EDEMA      PROCEDURES:    PLAN:     DIAGNOSIS:  1. DUB (dysfunctional uterine bleeding)    2. Body mass index 60.0-69.9, adult        MEDICATIONS & ORDERS:  Orders Placed This Encounter    POCT Urine Pregnancy    Tissue Specimen To Pathology, Obstetrics/Gynecology     20 MIN D/W PT ON AUB.    FOLLOW-UP: With me AFTER PATH

## 2024-11-19 LAB
ATRIAL RATE: 92 BPM
Q-T INTERVAL: 342 MS
QRS DURATION: 162 MS
QTC CALCULATION (BEZET): 443 MS
R AXIS: -34 DEGREES
T AXIS: 16 DEGREES
VENTRICULAR RATE: 101 BPM

## 2024-11-25 NOTE — INTERVAL H&P NOTE
Pre-op Diagnosis: * No pre-op diagnosis entered *    The above referenced H&P was reviewed by Deena Gomez MD on 11/25/2024, the patient was examined and no significant changes have occurred in the patient's condition since the H&P was performed.  I discussed with the patient and/or legal representative the potential benefits, risks and side effects of this procedure; the likelihood of the patient achieving goals; and potential problems that might occur during recuperation.  I discussed reasonable alternatives to the procedure, including risks, benefits and side effects related to the alternatives and risks related to not receiving this procedure.  We will proceed with procedure as planned.

## 2024-12-03 RX ORDER — POTASSIUM CHLORIDE 1500 MG/1
TABLET, EXTENDED RELEASE ORAL DAILY
COMMUNITY

## 2024-12-03 RX ORDER — BUMETANIDE 2 MG/1
2 TABLET ORAL
COMMUNITY

## 2024-12-03 NOTE — PAT NURSING NOTE
Called pt back to add - do not take Jardiance the morning of procedure. Denies difficulty swallowing, no loose teeth, but has a lower partial dental appliance. No recent abdominal surgeries.      PreOp Instructions     You are scheduled for: a Cardiac Procedure     Date of Procedure: 12/06/24     Diet Instructions: Do not eat or drink anything after midnight including gum, mints, candy, etc the night before your procedure.     Medications: Medications you are allowed to take can be taken with a sip of water the morning of your procedure.     Medications to Stop: DO NOT TAKE any herbal supplements and vitamins the morning of your procedure.     Other Medications: DO NOT TAKE Bumetanide the morning of your procedure.     Arrival Time: The day prior to your procedure you will receive a phone call between 3:00 pm- 6:00 pm with your arrival time. If you haven't received a phone call, please check your voicemail messages., If you did not receive a voice mail and it is after 6:00 pm, please call the nursing supervisor at [phone].    Driving After Procedure: Sedation will be given so you WILL NOT be able to drive home. You will need a responsible adult  to drive you home. You can NOT take uber or taxi unless approved by your physician in advance.     Discharge Teaching: Your nurse will give you specific instructions before discharge, Most people can resume normal activities in 2-3 days, Any questions, please call the physician's office

## 2024-12-06 ENCOUNTER — HOSPITAL ENCOUNTER (OUTPATIENT)
Dept: INTERVENTIONAL RADIOLOGY/VASCULAR | Facility: HOSPITAL | Age: 79
Discharge: HOME | End: 2024-12-06
Attending: INTERNAL MEDICINE | Admitting: INTERNAL MEDICINE
Payer: MEDICARE

## 2024-12-06 ENCOUNTER — HOSPITAL ENCOUNTER (OUTPATIENT)
Dept: CV DIAGNOSTICS | Facility: HOSPITAL | Age: 79
Discharge: HOME | End: 2024-12-06
Attending: INTERNAL MEDICINE
Payer: MEDICARE

## 2024-12-06 VITALS
HEIGHT: 62 IN | SYSTOLIC BLOOD PRESSURE: 109 MMHG | WEIGHT: 157 LBS | HEART RATE: 67 BPM | OXYGEN SATURATION: 93 % | DIASTOLIC BLOOD PRESSURE: 80 MMHG | BODY MASS INDEX: 28.89 KG/M2 | TEMPERATURE: 98 F | RESPIRATION RATE: 19 BRPM

## 2024-12-06 DIAGNOSIS — I48.91 A-FIB (HCC): ICD-10-CM

## 2024-12-06 LAB — INR: 2.5 (ref 0.8–1.3)

## 2024-12-06 PROCEDURE — 93005 ELECTROCARDIOGRAM TRACING: CPT

## 2024-12-06 PROCEDURE — B24BZZ4 ULTRASONOGRAPHY OF HEART WITH AORTA, TRANSESOPHAGEAL: ICD-10-PCS | Performed by: STUDENT IN AN ORGANIZED HEALTH CARE EDUCATION/TRAINING PROGRAM

## 2024-12-06 PROCEDURE — 93325 DOPPLER ECHO COLOR FLOW MAPG: CPT | Performed by: INTERNAL MEDICINE

## 2024-12-06 PROCEDURE — 99153 MOD SED SAME PHYS/QHP EA: CPT | Performed by: STUDENT IN AN ORGANIZED HEALTH CARE EDUCATION/TRAINING PROGRAM

## 2024-12-06 PROCEDURE — 99152 MOD SED SAME PHYS/QHP 5/>YRS: CPT | Performed by: STUDENT IN AN ORGANIZED HEALTH CARE EDUCATION/TRAINING PROGRAM

## 2024-12-06 PROCEDURE — 93312 ECHO TRANSESOPHAGEAL: CPT | Performed by: STUDENT IN AN ORGANIZED HEALTH CARE EDUCATION/TRAINING PROGRAM

## 2024-12-06 PROCEDURE — 5A2204Z RESTORATION OF CARDIAC RHYTHM, SINGLE: ICD-10-PCS | Performed by: STUDENT IN AN ORGANIZED HEALTH CARE EDUCATION/TRAINING PROGRAM

## 2024-12-06 PROCEDURE — 93320 DOPPLER ECHO COMPLETE: CPT | Performed by: INTERNAL MEDICINE

## 2024-12-06 PROCEDURE — 92960 CARDIOVERSION ELECTRIC EXT: CPT | Performed by: STUDENT IN AN ORGANIZED HEALTH CARE EDUCATION/TRAINING PROGRAM

## 2024-12-06 PROCEDURE — 93010 ELECTROCARDIOGRAM REPORT: CPT | Performed by: INTERNAL MEDICINE

## 2024-12-06 PROCEDURE — 85610 PROTHROMBIN TIME: CPT | Performed by: INTERNAL MEDICINE

## 2024-12-06 RX ORDER — MIDAZOLAM HYDROCHLORIDE 1 MG/ML
INJECTION INTRAMUSCULAR; INTRAVENOUS
Status: COMPLETED
Start: 2024-12-06 | End: 2024-12-06

## 2024-12-06 RX ORDER — METHOHEXITAL IN WATER/PF 100MG/10ML
SYRINGE (ML) INTRAVENOUS
Status: COMPLETED
Start: 2024-12-06 | End: 2024-12-06

## 2024-12-06 RX ORDER — MIDAZOLAM HYDROCHLORIDE 1 MG/ML
1-2 INJECTION INTRAMUSCULAR; INTRAVENOUS EVERY 5 MIN PRN
Status: DISCONTINUED | OUTPATIENT
Start: 2024-12-06 | End: 2024-12-06

## 2024-12-06 RX ORDER — METHOHEXITAL IN WATER/PF 100MG/10ML
SYRINGE (ML) INTRAVENOUS EVERY 5 MIN PRN
Status: DISCONTINUED | OUTPATIENT
Start: 2024-12-06 | End: 2024-12-06

## 2024-12-06 RX ORDER — SODIUM CHLORIDE 9 MG/ML
INJECTION, SOLUTION INTRAVENOUS
Status: DISCONTINUED | OUTPATIENT
Start: 2024-12-07 | End: 2024-12-06 | Stop reason: HOSPADM

## 2024-12-06 RX ADMIN — MIDAZOLAM HYDROCHLORIDE 3 MG: 1 INJECTION INTRAMUSCULAR; INTRAVENOUS at 11:16:00

## 2024-12-06 RX ADMIN — METHOHEXITAL IN WATER/PF 20 MG: 100MG/10ML SYRINGE (ML) INTRAVENOUS at 11:16:00

## 2024-12-06 NOTE — PROCEDURES
Cardiology 3D Transesophageal Echo Note    PRE-PROCEDURE DIAGNOSIS: Afib    PROCEDURE: Transesophageal Echocardiogram.    SEDATION:   Topical spray x 1  Versed: 3 mg  Fentanyl: 50 mcg  I personally supervised the intravenous administration of conscious sedation.  This was performed with continuous respiratory, hemodynamic, and electrocardiographic monitoring.  Sedation was supervised from 7332-2222.    DESCRIPTION:   Informed consent was obtained after risks and benefits of the procedure were explained. Oral hurricaine spray was placed in the oropharynx. Conscious sedation was given.  After adequate anesthesia, the MER probe was placed in the oropharynx with the esophagus being successfully intubated. Standard transgastric and multiplane views were obtained and available findings are as follows:    COMPLICATIONS: none    FINDINGS:   Overall reduced LV systolic function with estimated LVEF 40-45% without wall motion abnormalities.  Marked biatrial enlargement with interatrial right-to-left septal bowing.  Mitral valve with mild nonspecific thickening with mild regurgitation via multiple jets.  Tricuspid valve has severe regurgitation. Suspect related to malcoaptation. Annulus measuring approximately 4.7 cm.   There was a trileaflet aortic valve without stenosis or significant insufficiency.   Velocities of 35 cm/s were seen in the JONAS. There was no evidence for intracardiac mass or thrombus in the JONAS.  There was no evidence for reversal of flow in the pulmonary veins.   There was mild scattered atherosclerotic plaque in the visualized aorta without evidence for mobile atheromata or thrombus.  No pericardial effusion.    3-Dimensional Echocardiography performed for:  The evaluation of tricuspid valve disease.      Jersey Mcdowell DO  12/6/2024  1:11 PM

## 2024-12-06 NOTE — DISCHARGE INSTRUCTIONS
Structural Heart department to reach out with an appointment. If  no call by Tuesday please call [facility] at     Home Care Instructions following Transesophageal Echocardiography    Activity:  Do not drive after procedure. You may resume driving the following day.  Do not operate any machinery (including kitchen appliances or power tools).  Avoid drinking alcohol for 24 hours.  You may resume your normal activities tomorrow.  Do not eat anything for 2 hours.  Sip cool liquids initially and advance to a soft diet tonight.    What is normal:  You may experience a sore throat for 2-3 days following the examination.  Gargling with warm salt water (1/2 teaspoon of salt to 8 oz of warm water) or using throat lozenges that will help to soothe your throat.    When you should notify your physician:  If you experience sharp pain in your neck/abdomen/chest  If you have sudden nausea and/or vomitting  If you vomit blood  If you have a fever greater than 100 degrees.    Other:  You may resume your present diet, unless otherwise specified by your physician.  You may resume all of your medications as prescribed, unless otherwise directed by your physician.  A list of your medications was provided to you at discharge.  Please call your physician's office for a follow up appointment. You should be seen in 2 weeks.      Home Care Instructions Following Cardioversion or ICD Evaluation    Activity:  Do NOT drive after procedure.  You may resume driving after 24 hours.  Do NOT operate any machinery (including kitchen appliances or power tools).  Avoid drinking alcohol for 24 hours.  You may resume your normal activity after 24 hours.    What is normal:  Your skin may be red where the patches were placed.  The redness may last 2-3 days and feel like a \"sunburn\".  You may treat the area with an over-the-counter cream that is used for sunburned skin.    Special Instruction:  If you were taking medication Pradaxa  or Coumadin, it is very important to continue taking it until your follow-up appointment with your physician.    When you should NOTIFY YOUR PHYSICIAN:  If you have an ICD:  Anytime your ICD device fires.  If you feel that you have returned to an irregular rhythm.    Other:  You may resume your present diet, unless otherwise specified by your physician.  You may resume all of your medications as prescribed, unless otherwise directed by your physician.  A list of your medications was provided to you at discharge.  Please call your physician's office for a follow up appointment.  You should be seen in 2 weeks.

## 2024-12-06 NOTE — PROCEDURES
Cardiology Procedure Note    Kera Reed Location: Cath Lab   CSN [id number] MRN [id number]   Admission Date 12/6/2024  Operation Date 12/06/24    Attending Physician Jersey Mcdowell DO Operating Physician Jersey Mcdowell DO     Pre-Operative Diagnosis: Afib    Post-Operative Diagnosis: Same as above  PROCEDURE(S) PERFORMED:    1.    Cardioversion.  2.     Sedation      :  Jersey Mcdowell DO     ANESTHESIA:  IV sedation.  Moderate conscious sedation for this procedure was administered and personally monitored. Brevital 20 mg  Sedation time: See MER record     INDICATION:  Persistent Atrial Fibrillation     COMPLICATIONS:  None.     METHODS:  The patient was brought to the outpatient cardiac telemetry unit in a fasting and nonsedated state after providing informed consent.  IV sedation was administered during continuous ECG, pulse oximeter and noninvasive hemodynamic monitoring.  After administering IV Brevital in intermittent boluses, the desired level of sedation was achieved.      Cardioversion Energy:  200J    Pad Position: Anterior-Posterior  Pre- Cardioversion Rhythm- Afib  Post Cardioversion Rhythm - NSR    CONCLUSIONS:  1.    Successful cardioversion         Jersey Mcdowell DO     Cardiologist  [facility]

## 2024-12-06 NOTE — PROGRESS NOTES
Pt here for simba/dccv. Informed consent obtained.  MD/RN/echo tech at beside.  Time out completed. Meds given, view flowsheets. Pt tolerated procedure.  Bedrest completed, tolerating PO intake. MD speaking with patient/family, all questions answered. Discharge instructions given/explained to patient/family, all questions answered. Tele dc'd,lV dc'd catheter intact. Pt discharged via w/c instable condition

## 2024-12-07 LAB
ATRIAL RATE: 64 BPM
P AXIS: 51 DEGREES
P-R INTERVAL: 200 MS
Q-T INTERVAL: 468 MS
QRS DURATION: 164 MS
QTC CALCULATION (BEZET): 482 MS
R AXIS: -13 DEGREES
T AXIS: 2 DEGREES
VENTRICULAR RATE: 64 BPM

## 2024-12-10 ENCOUNTER — TELEPHONE (OUTPATIENT)
Dept: CARDIOLOGY CLINIC | Facility: HOSPITAL | Age: 79
End: 2024-12-10

## 2024-12-10 NOTE — INTERVAL H&P NOTE
Pre-op Diagnosis: Atrial fibrillation, HFrEF     The above referenced H&P was reviewed by Jersey Mcdowell DO on 12/10/2024, the patient was examined and no significant changes have occurred in the patient's condition since the H&P was performed.  I discussed with the patient and/or legal representative the potential benefits, risks and side effects of this procedure; the likelihood of the patient achieving goals; and potential problems that might occur during recuperation.  I discussed reasonable alternatives to the procedure, including risks, benefits and side effects related to the alternatives and risks related to not receiving this procedure.  We will proceed with procedure as planned.

## 2024-12-27 ENCOUNTER — HOSPITAL ENCOUNTER (OUTPATIENT)
Dept: CT IMAGING | Age: 79
Discharge: HOME | End: 2024-12-27
Attending: NURSE PRACTITIONER
Payer: MEDICARE

## 2024-12-27 DIAGNOSIS — L98.499 NON-HEALING ULCER (HCC): ICD-10-CM

## 2024-12-27 PROCEDURE — 75635 CT ANGIO ABDOMINAL ARTERIES: CPT | Performed by: NURSE PRACTITIONER

## 2025-01-08 ENCOUNTER — LAB ENCOUNTER (OUTPATIENT)
Dept: LAB | Age: 80
End: 2025-01-08
Attending: INTERNAL MEDICINE
Payer: MEDICARE

## 2025-01-08 DIAGNOSIS — Z01.818 PREOP EXAMINATION: Primary | ICD-10-CM

## 2025-01-08 LAB
ANION GAP SERPL CALC-SCNC: 8 MMOL/L (ref 0–18)
BASOPHILS # BLD AUTO: 0.03 X10(3) UL (ref 0–0.2)
BASOPHILS NFR BLD AUTO: 0.4 %
BUN BLD-MCNC: 28 MG/DL (ref 9–23)
CALCIUM BLD-MCNC: 9.9 MG/DL (ref 8.7–10.4)
CHLORIDE SERPL-SCNC: 103 MMOL/L (ref 98–112)
CO2 SERPL-SCNC: 30 MMOL/L (ref 21–32)
CREAT BLD-MCNC: 1.36 MG/DL
EGFRCR SERPLBLD CKD-EPI 2021: 39 ML/MIN/1.73M2 (ref 60–?)
EOSINOPHIL # BLD AUTO: 0.05 X10(3) UL (ref 0–0.7)
EOSINOPHIL NFR BLD AUTO: 0.7 %
ERYTHROCYTE [DISTWIDTH] IN BLOOD BY AUTOMATED COUNT: 18.5 %
FASTING STATUS PATIENT QL REPORTED: NO
GLUCOSE BLD-MCNC: 89 MG/DL (ref 70–99)
HCT VFR BLD AUTO: 43.8 %
HGB BLD-MCNC: 13.7 G/DL
IMM GRANULOCYTES # BLD AUTO: 0.04 X10(3) UL (ref 0–1)
IMM GRANULOCYTES NFR BLD: 0.5 %
LYMPHOCYTES # BLD AUTO: 1.53 X10(3) UL (ref 1–4)
LYMPHOCYTES NFR BLD AUTO: 20 %
MCH RBC QN AUTO: 30.2 PG (ref 26–34)
MCHC RBC AUTO-ENTMCNC: 31.3 G/DL (ref 31–37)
MCV RBC AUTO: 96.5 FL
MONOCYTES # BLD AUTO: 0.89 X10(3) UL (ref 0.1–1)
MONOCYTES NFR BLD AUTO: 11.6 %
NEUTROPHILS # BLD AUTO: 5.11 X10 (3) UL (ref 1.5–7.7)
NEUTROPHILS # BLD AUTO: 5.11 X10(3) UL (ref 1.5–7.7)
NEUTROPHILS NFR BLD AUTO: 66.8 %
OSMOLALITY SERPL CALC.SUM OF ELEC: 297 MOSM/KG (ref 275–295)
PLATELET # BLD AUTO: 280 10(3)UL (ref 150–450)
POTASSIUM SERPL-SCNC: 4.3 MMOL/L (ref 3.5–5.1)
RBC # BLD AUTO: 4.54 X10(6)UL
SODIUM SERPL-SCNC: 141 MMOL/L (ref 136–145)
WBC # BLD AUTO: 7.7 X10(3) UL (ref 4–11)

## 2025-01-08 PROCEDURE — 36415 COLL VENOUS BLD VENIPUNCTURE: CPT

## 2025-01-08 PROCEDURE — 85025 COMPLETE CBC W/AUTO DIFF WBC: CPT

## 2025-01-08 PROCEDURE — 80048 BASIC METABOLIC PNL TOTAL CA: CPT

## 2025-01-10 RX ORDER — PREDNISOLONE ACETATE 10 MG/ML
1 SUSPENSION/ DROPS OPHTHALMIC 4 TIMES DAILY
COMMUNITY

## 2025-01-10 RX ORDER — MULTIVITAMIN WITH IRON
250 TABLET ORAL DAILY
COMMUNITY

## 2025-01-10 RX ORDER — BUMETANIDE 1 MG/1
1 TABLET ORAL
COMMUNITY

## 2025-01-10 NOTE — PAT NURSING NOTE
Per PAT encounter/MyChart message sent to pt/ took notes as well:    PreOp Instructions     You are scheduled for: a Peripheral Procedure     Date of Procedure: 01/13/25 Monday; check in at 12 pm.     Diet Instructions: Do not eat or drink anything after midnight including gum, mints, candy, etc.     Medications: Medications you are allowed to take can be taken with a sip of water the morning of your procedure, Take Plavix 75mg the day of your procedure, Take an Aspirin 81 mg tablet the day of your procedure     Do not take the following Blood Thinner(s): Continue to hold your Warfarin/Coumadin doses until instructed to resume.     Medications to Stop: Hold herbal supplements and vitamins     Other Medications: Do NOT take your Bumetanide/Bumex and Jardiance doses on Monday morning 1/13.      Skin Prep : Shower with antibacterial soap using a clean washcloth, prior to procedure. Once dried off, no lotions/powders/creams/ointments, etc.    Driving After Procedure: Sedation will be given so you WILL NOT be able to drive home. You will need a responsible adult  to drive you home. You can NOT take uber or taxi unless approved by your physician in advance.     Discharge Teaching: Your nurse will give you specific instructions before discharge, Most people can resume normal activities in 2-3 days, Any questions, please call the physician's office      parking is available starting at 6 am or park in the [place] parking garage at [facility]. Check in at the [facility] reception desk. Our  will be there to check you in for your procedure. Please bring your insurance cards and ID with you.                                                                                                                                      Please DO NOT respond to this message, the inbasket is not monitored for messages. For any questions, please call the physician's office.

## 2025-01-13 ENCOUNTER — HOSPITAL ENCOUNTER (OUTPATIENT)
Dept: INTERVENTIONAL RADIOLOGY/VASCULAR | Facility: HOSPITAL | Age: 80
Discharge: HOME | End: 2025-01-13
Attending: INTERNAL MEDICINE | Admitting: INTERNAL MEDICINE
Payer: MEDICARE

## 2025-01-13 VITALS
OXYGEN SATURATION: 94 % | BODY MASS INDEX: 27.79 KG/M2 | HEIGHT: 62 IN | RESPIRATION RATE: 24 BRPM | HEART RATE: 95 BPM | DIASTOLIC BLOOD PRESSURE: 79 MMHG | WEIGHT: 151 LBS | SYSTOLIC BLOOD PRESSURE: 109 MMHG

## 2025-01-13 DIAGNOSIS — L97.519 RIGHT FOOT ULCER (HCC): ICD-10-CM

## 2025-01-13 DIAGNOSIS — I73.9 PAD (PERIPHERAL ARTERY DISEASE) (HCC): ICD-10-CM

## 2025-01-13 LAB — INR: 1.1 (ref 0.8–1.3)

## 2025-01-13 PROCEDURE — 85610 PROTHROMBIN TIME: CPT | Performed by: INTERNAL MEDICINE

## 2025-01-13 PROCEDURE — 99152 MOD SED SAME PHYS/QHP 5/>YRS: CPT | Performed by: INTERNAL MEDICINE

## 2025-01-13 PROCEDURE — 36247 INS CATH ABD/L-EXT ART 3RD: CPT | Performed by: INTERNAL MEDICINE

## 2025-01-13 PROCEDURE — B41DYZZ FLUOROSCOPY OF AORTA AND BILATERAL LOWER EXTREMITY ARTERIES USING OTHER CONTRAST: ICD-10-PCS | Performed by: INTERNAL MEDICINE

## 2025-01-13 PROCEDURE — 75716 ARTERY X-RAYS ARMS/LEGS: CPT | Performed by: INTERNAL MEDICINE

## 2025-01-13 PROCEDURE — 99153 MOD SED SAME PHYS/QHP EA: CPT | Performed by: INTERNAL MEDICINE

## 2025-01-13 PROCEDURE — 75625 CONTRAST EXAM ABDOMINL AORTA: CPT | Performed by: INTERNAL MEDICINE

## 2025-01-13 RX ORDER — LIDOCAINE HYDROCHLORIDE 10 MG/ML
INJECTION, SOLUTION EPIDURAL; INFILTRATION; INTRACAUDAL; PERINEURAL
Status: COMPLETED
Start: 2025-01-13 | End: 2025-01-13

## 2025-01-13 RX ORDER — SODIUM CHLORIDE 9 MG/ML
INJECTION, SOLUTION INTRAVENOUS CONTINUOUS
Status: DISCONTINUED | OUTPATIENT
Start: 2025-01-13 | End: 2025-01-13

## 2025-01-13 RX ORDER — HEPARIN SODIUM 5000 [USP'U]/ML
INJECTION, SOLUTION INTRAVENOUS; SUBCUTANEOUS
Status: COMPLETED
Start: 2025-01-13 | End: 2025-01-13

## 2025-01-13 RX ORDER — IODIXANOL 320 MG/ML
150 INJECTION, SOLUTION INTRAVASCULAR
Status: DISCONTINUED | OUTPATIENT
Start: 2025-01-13 | End: 2025-01-13

## 2025-01-13 RX ORDER — MIDAZOLAM HYDROCHLORIDE 1 MG/ML
INJECTION INTRAMUSCULAR; INTRAVENOUS
Status: COMPLETED
Start: 2025-01-13 | End: 2025-01-13

## 2025-01-13 NOTE — CONSULTS
[facility]    PATIENT'S NAME: NIKHIL BOLAND   ATTENDING PHYSICIAN: Deena Gomez MD   CONSULTING PHYSICIAN: John Hoffmann M.D.   PATIENT ACCOUNT#:   [id number]    LOCATION:  [address]  MEDICAL RECORD #:   [id number]       YOB: 1945  ADMISSION DATE:       01/13/2025      CONSULT DATE:  01/13/2025    REPORT OF CONSULTATION    HISTORY OF PRESENT ILLNESS:  This is an 80-year-old white female seen with her  present at the bedside, who has an ulceration and a right second hammertoe that has been going on for several months.  The patient has been followed by Podiatry, Dr. Cross, and he would not recommend doing any surgery unless the blood flow was improved.  The patient does have a hammertoe in this area.  She states she has had an MRI in the past, had open-heart surgery by Dr. Waddell about 10 to 12 years ago.  She denies any chest pain or shortness of breath at this time.  She had a recent cardiac catheterization by Dr. Gomez.  She has been followed by Dr. Hernández, as well as by Dr. Gomez.  She had seen Dr. John Tapia from [place] in the past for cardiac status.      PAST MEDICAL HISTORY:  She denies diabetes.  She has had no previous DVT.      PAST SURGICAL HISTORY:  A total knee replacement of the right knee, total abdominal hysterectomy, the coronary artery bypass surgery.      MEDICATIONS:  The patient is on Plavix, losartan, levothyroxine, metoprolol succinate 100 mg a day, pregabalin, warfarin 5 mg a day, Jardiance.    SOCIAL HISTORY:  She has a history of smoking but quit years ago.  No EtOH abuse or drug abuse.  She is .  Her  is a .  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  She is adopted.  She has 3 children.      FAMILY HISTORY:  Peripheral vascular disease, coronary artery disease.      REVIEW OF SYSTEMS:  Currently, no CVA, TIA, visual field defect, or aphasia.  The patient currently has no hematuria, dysuria,  hemoptysis, cough, sputum production.  No hematemesis or bright red blood per rectum.  She denies any weight loss, fever, or chills.        PHYSICAL EXAMINATION:    GENERAL:  Currently, the patient is awake, alert.  She is appropriate.  She is in no acute distress, resting comfortably.    VITAL SIGNS:  Blood pressure was 112/73, heart rate is about 80 to 90.    HEENT:  Pupils are equal and round.  Sclerae clear.  Mouth without lesions.    NECK:  No cervical bruit.  No JVD.    LUNGS:  Grossly normal.    HEART:  Grossly normal.    ABDOMEN:  Soft.  There is no tenderness or masses.  EXTREMITIES:  Both femoral pulses are palpable, maybe slightly diminished on the right side.  Both popliteal pulses are palpable, maybe slightly diminished on the right side.  Pedal pulses are diminished bilaterally.  There is about a 1 to 2 mm ulceration of the dorsum of the right second toe with a hammertoe of the area of the first phalanx.  No swelling or tenderness in the calf.  Upper extremity pulses are palpable.  No palpable lymph nodes in the groins.  No clubbing of the fingertips.  NEUROLOGIC:  She is intact.     LABORATORY DATA:  The patient's labs were reviewed.  Creatinine 1.06 with a BUN of 24.  Her last white count was 8.2, platelets 242.    I do not see any recent duplex ultrasound arterial, but a CT angiogram reviewed as well as the angiogram that showed bilateral iliofemoral arterial occlusive disease.  There was plaque disease also in the aorta and common iliac arteries bilaterally.    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  Being followed by Podiatry.    At this time, after a lengthy discussion with the patient and her  regarding the pathology, the treatment for the pathology in this location would be iliofemoral endarterectomy and vein patch as an open repair, with the risks and complications including death,  myocardial infarction, bleeding, infection, graft thrombosis, limb loss, future graft thrombosis, future limb loss, renal failure, wound healing, swelling, lymphedema of the legs, paresthesias.  The risks and complications of an endovascular attempt versus the risks and complications of medical management.  I would lean towards medical management at this time since the ulcer is so small.  I told her to protect the foot, use a bunny boot to separate the feet, where proper footwear for the patient, and follow up with her podiatrist.  We will have a duplex ultrasound lower extremity arterial ordered for her at this point, and I told her to follow up in the office in 1 to 2 months.  I gave them my card with my cell phone number.    Dictated By John Hoffmann M.D.  d: 01/13/2025 16:39:16  t: 01/13/2025 16:50:11  Job [id number]  JFRANCISCO J/    cc: BROOKE Cherry MD

## 2025-01-13 NOTE — PLAN OF CARE
Patient had peripheral angio with Dr. Gomez today. Right groin arterial sheath in place, CDI, soft, no hematoma. +1 pedal pulse. VSS. Patient denies any pain.  @ bedside. Right groin arterial sheath D/C'd per protocol with manual pressure held until hemostasis achieved. Right groin soft, CDI, no hematoma. Dr. Gomez @ bedside. Patient tolerating po intake. Will continue to monitor.

## 2025-01-13 NOTE — PROCEDURES
[facility] Vascular Procedure Note  Kera Reed Patient Status:  Outpatient    1945 MRN [id number]   [facility] INTERVENTIONAL SUITES Attending Deena Gomez MD   Hosp Day # 0 PCP Daya Chandler MD       Cardiologist: Deena Gomez MD  Primary Proceduralist: Deena Gomez MD  Procedure Performed:  peripheral angiogram  Date of Procedure: 2025   Indication: critical limb ischemia    Summary of procedure:        Peripheral angiogram:    Distal aorta:  ulcerative plaque, mild narrowing, no gradient    Right  Common iliac:  Non-obstructive     External iliac:  Non-obstructive     Internal iliac:  Non-obstructive     Common femoral artery:  Severe calcified stenosis    SFA:  Non-obstructive     Popliteal:  Non-obstructive     Below-knee runoff:    AT:  mid  PT patent  Peroneal: patent      Left  Common iliac:  Non-obstructive     External iliac:  Non-obstructive     Internal iliac:  Non-obstructive     Common femoral artery:  severe calcified stenosis    SFA:  Non-obstructive     Popliteal:  Non-obstructive     Below-knee runoff:    AT:  distal  PT patent  Peroneal: patent      Assessment:  Severe Bilateral Common Femoral Artery Stenosis      Recommendations:  Will consult vascular surgery for vein patch for bilateral common femoral arteries.  If her distal toes do not heal we then return for reconstruction of her plantar arch  Continue current antiplatelet therapy      Description of Procedure:   After written informed consent was obtained from the patient, patient was brought to the cardiac catheterization laboratory.  Patient was prepped and draped in the usual sterile fashion. Lidocaine 1% was used to infiltrate the right groin for local anesthesia and a 6 Telugu introducer sheath was inserted into the right femoral artery.  We then performed a right femoral angiogram with distal runoff.  We then advanced a 5 Telugu pigtail catheter over an 035 wire into the abdominal aorta.  We then  performed an angiogram in the AP position.  We then pulled down through the stenotic segment and performed a gradient.  We then exchanged for a crossover catheter over an 035 glide advantage wire and pulled down and directed toward the left iliac artery.  The glide advantage was advanced into the left SFA followed by the catheter.  We then performed angiogram of the left iliofemoral system with distal runoff.  The catheter was then pulled out the body over the 3 5 wire.  Using manual pressure the sheath was removed.          Specimen sent to: No specimen collected  Estimated blood loss: 10 cc  Closure:  manual      IV was maintained by RN and moderate conscious sedation of versed and fentanyl was given.  Patient was assessed and monitoring of oxygen, heart rate and blood pressure by nurse and myself during the exam 15 minutes.  3 mg of Versed and 50 mcg of fentanyl given.  Start time is 1410 and end time was 1425.      Deena Gomez MD  01/13/25

## 2025-01-14 NOTE — DISCHARGE INSTRUCTIONS
Call central scheduling to schedule ultrasound of lower extremities. [phone].    See post peripheral angiogram discharge home care instructions.    Resume your warfarin tomorrow, 1/14/25.   Alert/Cooperative/Awake

## 2025-01-14 NOTE — PLAN OF CARE
Recovery time completed. Patient ambulated to BR and in rose, tolerated well. Groin stable. Discharge instructions reviewed. IV D/C'd. Patient discharged to [facility] by wheelchair with belongings. Patient's  is .

## 2025-01-23 ENCOUNTER — HOSPITAL ENCOUNTER (OUTPATIENT)
Dept: CV DIAGNOSTICS | Facility: HOSPITAL | Age: 80
Discharge: HOME | End: 2025-01-23
Attending: INTERNAL MEDICINE
Payer: MEDICARE

## 2025-01-23 ENCOUNTER — HOSPITAL ENCOUNTER (OUTPATIENT)
Dept: CT IMAGING | Facility: HOSPITAL | Age: 80
Discharge: HOME | End: 2025-01-23
Attending: INTERNAL MEDICINE
Payer: MEDICARE

## 2025-01-23 ENCOUNTER — HOSPITAL ENCOUNTER (OUTPATIENT)
Dept: LAB | Facility: HOSPITAL | Age: 80
Discharge: HOME | End: 2025-01-23
Attending: INTERNAL MEDICINE
Payer: MEDICARE

## 2025-01-23 ENCOUNTER — HOSPITAL ENCOUNTER (OUTPATIENT)
Dept: CARDIOLOGY CLINIC | Facility: HOSPITAL | Age: 80
Discharge: HOME | End: 2025-01-23
Attending: INTERNAL MEDICINE
Payer: MEDICARE

## 2025-01-23 DIAGNOSIS — I10 HTN (HYPERTENSION): ICD-10-CM

## 2025-01-23 DIAGNOSIS — I50.9 CHF (CONGESTIVE HEART FAILURE) (HCC): ICD-10-CM

## 2025-01-23 DIAGNOSIS — I07.1 SEVERE TRICUSPID REGURGITATION: ICD-10-CM

## 2025-01-23 DIAGNOSIS — I48.0 PAF (PAROXYSMAL ATRIAL FIBRILLATION) (HCC): ICD-10-CM

## 2025-01-23 DIAGNOSIS — N18.9 CKD (CHRONIC KIDNEY DISEASE): ICD-10-CM

## 2025-01-23 DIAGNOSIS — Z95.1 HX OF CABG: ICD-10-CM

## 2025-01-23 LAB
ANION GAP SERPL CALC-SCNC: 8 MMOL/L (ref 0–18)
BUN BLD-MCNC: 31 MG/DL (ref 9–23)
CALCIUM BLD-MCNC: 9.9 MG/DL (ref 8.7–10.6)
CHLORIDE SERPL-SCNC: 104 MMOL/L (ref 98–112)
CO2 SERPL-SCNC: 32 MMOL/L (ref 21–32)
CREAT BLD-MCNC: 1.38 MG/DL
EGFRCR SERPLBLD CKD-EPI 2021: 38 ML/MIN/1.73M2 (ref 60–?)
GLUCOSE BLD-MCNC: 148 MG/DL (ref 70–99)
OSMOLALITY SERPL CALC.SUM OF ELEC: 307 MOSM/KG (ref 275–295)
POTASSIUM SERPL-SCNC: 4.5 MMOL/L (ref 3.5–5.1)
SODIUM SERPL-SCNC: 144 MMOL/L (ref 136–145)

## 2025-01-23 PROCEDURE — 93010 ELECTROCARDIOGRAM REPORT: CPT | Performed by: INTERNAL MEDICINE

## 2025-01-23 PROCEDURE — 36415 COLL VENOUS BLD VENIPUNCTURE: CPT | Performed by: INTERNAL MEDICINE

## 2025-01-23 PROCEDURE — 93005 ELECTROCARDIOGRAM TRACING: CPT

## 2025-01-23 PROCEDURE — 80048 BASIC METABOLIC PNL TOTAL CA: CPT | Performed by: INTERNAL MEDICINE

## 2025-01-23 PROCEDURE — 99212 OFFICE O/P EST SF 10 MIN: CPT

## 2025-01-23 NOTE — PROGRESS NOTES
Cynthia procedure and process explained.  Dr. Moreland will review with Dr. Hernández.  Pt does not need to move forward with tricuspid testing.  PCP information given for [place] location.

## 2025-01-23 NOTE — CONSULTS
[facility]    PATIENT'S NAME: NIKHIL BOLAND   ATTENDING PHYSICIAN: Epifanio Moreland M.D.   CONSULTING PHYSICIAN: Epifanio Moreland M.D.   PATIENT ACCOUNT#:   [id number]    LOCATION:  [facility]  MEDICAL RECORD #:   [id number]       YOB: 1945  ADMISSION DATE:       01/23/2025      CONSULT DATE:  01/23/2025    REPORT OF CONSULTATION    HISTORY OF PRESENT ILLNESS:  This is the first office visit for the patient with me.  I am seeing her today in the [facility] for consideration of percutaneous management of her tricuspid regurgitation.    The patient has known coronary disease and had coronary artery bypass surgery in 2012 with Dr. Hendricks at [facility] in [place].  She followed with Dr. Alfaro's group for several years after her surgery, but has had no regular cardiac followup now for about 5 years.    This past fall, she awoke 1 morning feeling poorly.  She was having difficulty with her breathing and felt like her heart was racing.  She was subsequently identified as having rapid atrial fibrillation.    Her first visit to our office was with Dr. Hernández on November 8.  She was still in atrial fibrillation at that time and was feeling shortness of breath with exertion.  Ejection fraction was reportedly 35% to 40% with inferior hypokinesia.    She was referred to Dr. Gomez with plans to consider cardioversion once her coronary anatomy was defined.    Dr. Gomez saw the patient on November 12 and set her up for a cardiac catheterization which was performed on November 18.    Calcified atherosclerosis was identified in the right common femoral artery.    The left main coronary artery had a significant stenosis distally.  The left anterior descending coronary artery was patent.  The left circumflex coronary artery was occluded proximally.  The right coronary artery had minimal nonobstructive atherosclerosis proximally.  The vein graft was patent to an anterolateral vessel.  I  cannot tell on the angiogram whether this is a diagonal or a ramus.  The vein graft was patent to an obtuse marginal.  This had nonobstructive disease in its proximal portion.    The subclavian appeared to have extensive atherosclerosis proximally.  The mammary was functionally occluded.    She underwent successful angioplasty and stenting of the left main coronary artery.    On December 6, she had a MER-guided cardioversion.  She was converted successfully to sinus rhythm with a single 200 joule shock; yet, apparently per her and her 's report, this lasted for only 3 days before atrial fibrillation returned.  The transesophageal echocardiogram identified severe tricuspid insufficiency which prompted the referral today to our clinic.    She has been feeling better.  She has had no overt heart failure symptomatology.    In addition, she has been dealing with podiatry issues.  She has a hammertoe on her right foot.  This is the second toe and it has an abrasion on the top from rubbing on her shoes.  This has been there now for some time and has not been healing.  She has seen a podiatrist who does not recommend surgery due to concern of circulatory issues.  She has been on antibiotics.  She is currently wearing a special shoe to avoid pressure on the toe.      She was seen by Dr. Hoffmann.  He felt that medical management would be preferred at the moment instead of a femoral endarterectomy.    She underwent peripheral angiography with Dr. Gomez on January 13.  She had bilateral common femoral disease.  On the right, the SFA was patent along with the popliteal.  Below the knee, she had 2-vessel runoff through the posterior tibial and peroneal.  The anterior tibial was occluded.    She has not had a transthoracic echocardiogram that I can see in the [facility] system or the [facility] system.  Right ventricular function on the transesophageal echocardiogram seems mildly depressed to me, and the ventricle appears mildly  enlarged.  She has marked biatrial enlargement.  Mild to moderate mitral insufficiency was present.  There was no evidence of aortic stenosis.    PHYSICAL EXAMINATION:    LUNGS:  Clear.    HEART:  She is in atrial fibrillation with a controlled ventricular response rate.  The jugular venous pressure does not appear significantly elevated.  There is a faint V wave present in the upright position.  EXTREMITIES:  She has varicosities in the lower extremities with trace edema.    Her electrocardiogram reveals atrial fibrillation with a bifascicular block.    ASSESSMENT AND PLAN:  I had a long discussion today with the patient and her  regarding her multiple medical issues.  At this point, in my opinion, there is no absolute clinical indication to proceed with either percutaneous repair or percutaneous replacement of her tricuspid valve.  I do feel that this is a potential in the future; yet, I think prior to consideration of this intervention we should work harder on trying to establish sinus rhythm, and certainly even if she remains in atrial fibrillation, only consider valvular intervention if she remains and/or becomes significantly symptomatic.    I will speak directly with Dr. Hernández.  Given her coronary disease, my initial plan would be to place her on amiodarone and attempt a repeat cardioversion plus/minus an attempt at ablation depending upon Dr. Hernández's thoughts.  I will let her direct the rhythm management.    I will need to speak directly with Dr. Hoffmann.  It would appear that the patient has adequate blood flow to heal surgery on her foot, but this is open for further discussion.  She does have significant varicosities and certainly has venous insufficiency as described, but she also has some erythema of the forefoot with very dry scaling skin, which may well be a reflection of her arterial insufficiency as well.    She needs to establish regular followup in our office with both Dr. Gomez and Dr. Hernández  so that we have clear direction of her management.    I tried to explain all these things to her and her .  I think they have a nice understanding and seem comfortable with that approach.    I will ask that she come back to see me again in the [facility] within the next 6 months.  At a minimum, she needs a transthoracic echocardiogram along the way as her baseline assessment.    I think she and her  have a nice understanding and seem comfortable with that approach.    Dictated By Epifanio Moreland M.D.  d: 01/23/2025 11:44:29  t: 01/23/2025 12:22:41  [facility] [id number]  /

## 2025-01-24 LAB
Q-T INTERVAL: 402 MS
QRS DURATION: 160 MS
QTC CALCULATION (BEZET): 478 MS
R AXIS: -39 DEGREES
T AXIS: 18 DEGREES
VENTRICULAR RATE: 85 BPM

## 2025-02-04 ENCOUNTER — HOSPITAL ENCOUNTER (OUTPATIENT)
Dept: CV DIAGNOSTICS | Age: 80
Discharge: HOME | End: 2025-02-04
Attending: INTERNAL MEDICINE
Payer: MEDICARE

## 2025-02-04 PROCEDURE — 93306 TTE W/DOPPLER COMPLETE: CPT | Performed by: INTERNAL MEDICINE

## 2025-02-21 RX ORDER — AMIODARONE HYDROCHLORIDE 200 MG/1
200 TABLET ORAL 2 TIMES DAILY
COMMUNITY
End: 2025-03-04

## 2025-02-28 ENCOUNTER — HOSPITAL ENCOUNTER (INPATIENT)
Dept: INTERVENTIONAL RADIOLOGY/VASCULAR | Facility: HOSPITAL | Age: 80
LOS: 3 days | Discharge: HOME | End: 2025-03-04
Attending: INTERNAL MEDICINE | Admitting: INTERNAL MEDICINE
Payer: MEDICARE

## 2025-02-28 ENCOUNTER — APPOINTMENT (OUTPATIENT)
Dept: ULTRASOUND IMAGING | Facility: HOSPITAL | Age: 80
End: 2025-02-28
Attending: INTERNAL MEDICINE
Payer: MEDICARE

## 2025-02-28 ENCOUNTER — APPOINTMENT (OUTPATIENT)
Dept: CT IMAGING | Facility: HOSPITAL | Age: 80
End: 2025-02-28
Attending: INTERNAL MEDICINE
Payer: MEDICARE

## 2025-02-28 ENCOUNTER — HOSPITAL ENCOUNTER (OUTPATIENT)
Dept: CV DIAGNOSTICS | Facility: HOSPITAL | Age: 80
Discharge: HOME | End: 2025-02-28
Attending: INTERNAL MEDICINE
Payer: MEDICARE

## 2025-02-28 DIAGNOSIS — I48.91 A-FIB (HCC): ICD-10-CM

## 2025-02-28 DIAGNOSIS — M25.511 ACUTE PAIN OF RIGHT SHOULDER: Primary | ICD-10-CM

## 2025-02-28 PROBLEM — I48.11 LONGSTANDING PERSISTENT ATRIAL FIBRILLATION (HCC): Status: ACTIVE | Noted: 2025-02-28

## 2025-02-28 PROBLEM — R79.1 SUPRATHERAPEUTIC INR: Status: ACTIVE | Noted: 2025-02-28

## 2025-02-28 LAB
CREAT BLD-MCNC: 0.95 MG/DL
EGFRCR SERPLBLD CKD-EPI 2021: 60 ML/MIN/1.73M2 (ref 60–?)
ERYTHROCYTE [DISTWIDTH] IN BLOOD BY AUTOMATED COUNT: 18.3 %
EST. AVERAGE GLUCOSE BLD GHB EST-MCNC: 148 MG/DL (ref 68–126)
HBA1C MFR BLD: 6.8 % (ref ?–5.7)
HCT VFR BLD AUTO: 38.1 %
HGB BLD-MCNC: 12.8 G/DL
INR BLD: 8.01 (ref 0.8–1.2)
INR: 8 (ref 0.8–1.3)
MCH RBC QN AUTO: 31.4 PG (ref 26–34)
MCHC RBC AUTO-ENTMCNC: 33.6 G/DL (ref 31–37)
MCV RBC AUTO: 93.4 FL
PLATELET # BLD AUTO: 210 10(3)UL (ref 150–450)
PROTHROMBIN TIME: 67.6 SECONDS (ref 11.6–14.8)
RBC # BLD AUTO: 4.08 X10(6)UL
WBC # BLD AUTO: 6.2 X10(3) UL (ref 4–11)

## 2025-02-28 PROCEDURE — 99223 1ST HOSP IP/OBS HIGH 75: CPT | Performed by: INTERNAL MEDICINE

## 2025-02-28 PROCEDURE — 93971 EXTREMITY STUDY: CPT | Performed by: INTERNAL MEDICINE

## 2025-02-28 RX ORDER — HYDROCODONE BITARTRATE AND ACETAMINOPHEN 5; 325 MG/1; MG/1
1 TABLET ORAL EVERY 4 HOURS PRN
Status: DISCONTINUED | OUTPATIENT
Start: 2025-02-28 | End: 2025-03-04

## 2025-02-28 RX ORDER — POLYETHYLENE GLYCOL 3350 17 G/17G
17 POWDER, FOR SOLUTION ORAL DAILY PRN
Status: DISCONTINUED | OUTPATIENT
Start: 2025-02-28 | End: 2025-03-04

## 2025-02-28 RX ORDER — BUSPIRONE HYDROCHLORIDE 5 MG/1
10 TABLET ORAL 2 TIMES DAILY
Status: DISCONTINUED | OUTPATIENT
Start: 2025-02-28 | End: 2025-03-04

## 2025-02-28 RX ORDER — BUSPIRONE HYDROCHLORIDE 10 MG/1
10 TABLET ORAL 2 TIMES DAILY
COMMUNITY

## 2025-02-28 RX ORDER — SODIUM CHLORIDE 9 MG/ML
INJECTION, SOLUTION INTRAVENOUS CONTINUOUS
Status: DISCONTINUED | OUTPATIENT
Start: 2025-02-28 | End: 2025-03-01 | Stop reason: HOSPADM

## 2025-02-28 RX ORDER — SODIUM PHOSPHATE, DIBASIC AND SODIUM PHOSPHATE, MONOBASIC 7; 19 G/230ML; G/230ML
1 ENEMA RECTAL ONCE AS NEEDED
Status: DISCONTINUED | OUTPATIENT
Start: 2025-02-28 | End: 2025-03-04

## 2025-02-28 RX ORDER — METHOHEXITAL IN WATER/PF 100MG/10ML
SYRINGE (ML) INTRAVENOUS
Status: DISCONTINUED
Start: 2025-02-28 | End: 2025-02-28 | Stop reason: WASHOUT

## 2025-02-28 RX ORDER — LEVOTHYROXINE SODIUM 75 UG/1
75 TABLET ORAL
Status: DISCONTINUED | OUTPATIENT
Start: 2025-03-01 | End: 2025-03-04

## 2025-02-28 RX ORDER — ACETAMINOPHEN 325 MG/1
650 TABLET ORAL EVERY 4 HOURS PRN
Status: DISCONTINUED | OUTPATIENT
Start: 2025-02-28 | End: 2025-03-04

## 2025-02-28 RX ORDER — AMIODARONE HYDROCHLORIDE 200 MG/1
200 TABLET ORAL DAILY
Status: DISCONTINUED | OUTPATIENT
Start: 2025-02-28 | End: 2025-03-04

## 2025-02-28 RX ORDER — MIDAZOLAM HYDROCHLORIDE 1 MG/ML
INJECTION INTRAMUSCULAR; INTRAVENOUS
Status: DISCONTINUED
Start: 2025-02-28 | End: 2025-02-28 | Stop reason: WASHOUT

## 2025-02-28 RX ORDER — AMIODARONE HYDROCHLORIDE 200 MG/1
200 TABLET ORAL 2 TIMES DAILY
Status: DISCONTINUED | OUTPATIENT
Start: 2025-02-28 | End: 2025-02-28

## 2025-02-28 RX ORDER — BISACODYL 10 MG
10 SUPPOSITORY, RECTAL RECTAL
Status: DISCONTINUED | OUTPATIENT
Start: 2025-02-28 | End: 2025-03-04

## 2025-02-28 RX ORDER — ONDANSETRON 2 MG/ML
4 INJECTION INTRAMUSCULAR; INTRAVENOUS EVERY 6 HOURS PRN
Status: DISCONTINUED | OUTPATIENT
Start: 2025-02-28 | End: 2025-03-04

## 2025-02-28 RX ORDER — SENNOSIDES 8.6 MG
17.2 TABLET ORAL NIGHTLY PRN
Status: DISCONTINUED | OUTPATIENT
Start: 2025-02-28 | End: 2025-03-04

## 2025-02-28 RX ORDER — METHOHEXITAL IN WATER/PF 100MG/10ML
100 SYRINGE (ML) INTRAVENOUS ONCE
Status: DISCONTINUED | OUTPATIENT
Start: 2025-02-28 | End: 2025-02-28 | Stop reason: HOSPADM

## 2025-02-28 RX ORDER — ALPRAZOLAM 0.25 MG
0.25 TABLET ORAL
Status: DISCONTINUED | OUTPATIENT
Start: 2025-02-28 | End: 2025-03-04

## 2025-02-28 RX ORDER — HYDROCODONE BITARTRATE AND ACETAMINOPHEN 5; 325 MG/1; MG/1
2 TABLET ORAL EVERY 4 HOURS PRN
Status: DISCONTINUED | OUTPATIENT
Start: 2025-02-28 | End: 2025-03-04

## 2025-02-28 RX ORDER — ACETAMINOPHEN 500 MG
500 TABLET ORAL EVERY 4 HOURS PRN
Status: DISCONTINUED | OUTPATIENT
Start: 2025-02-28 | End: 2025-03-04

## 2025-02-28 RX ADMIN — BUSPIRONE HYDROCHLORIDE 10 MG: 5 TABLET ORAL at 20:46:00

## 2025-02-28 NOTE — CONSULTS
[facility]  Cardiology Consultation    Kera Reed Patient Status:  Inpatient    1945 MRN [id number]   Location [facility] 8NE-A Attending Deena Gomez MD   Hosp Day # 0 PCP Daya Chandler MD     Reason for Consultation:  A Fib, Supratherapeutic INR    History of Present Illness:  Kera Reed is a a(n) 80 year old female who presents with with atrial fibrillation, severe tricuspid valve regurgitation, multivessel coronary disease, history of congestive heart failure with mildly reduced ejection fraction.,  Peripheral vascular disease presented to the hospital today for an outpatient cardioversion.  When she arrived however she had a swollen right upper extremity that was heavily bruised.  She states it is mildly painful.  She is currently taking amiodarone and Coumadin    History:  Past Medical History:    Allergic rhinitis    Congestive heart disease (HCC)    Coronary atherosclerosis    High blood pressure    High cholesterol    Hypothyroidism     Past Surgical History:   Procedure Laterality Date    Bypass surgery      cardiac    Cabg      Colonoscopy  2018    Amsdima Hyde    Hysterectomy      Knee replacement surgery       Family History   Family history unknown: Yes      reports that she quit smoking about 25 years ago. Her smoking use included cigarettes. She started smoking about 65 years ago. She has a 40 pack-year smoking history. She has never used smokeless tobacco. She reports that she does not currently use alcohol. She reports that she does not use drugs.    Allergies:  Allergies[1]    Medications:    Current Facility-Administered Medications:     sodium chloride 0.9% infusion, , Intravenous, Continuous    [Held by provider] phytonadione (Vitamin K) 1 mg/mL oral syringe 2.5 mg, 2.5 mg, Oral, Once    Review of Systems:  A comprehensive review of systems was negative if not otherwise mention in above HPI.    /84 (BP Location: Left arm)   Pulse 90   Temp 98 °F  (36.7 °C) (Oral)   Resp 19   Ht 5' 2\" (1.575 m)   Wt 158 lb 4.6 oz (71.8 kg)   LMP  (LMP Unknown)   SpO2 100%   BMI 28.95 kg/m²   Temp (24hrs), Av.2 °F (36.8 °C), Min:98 °F (36.7 °C), Max:98.3 °F (36.8 °C)       Intake/Output Summary (Last 24 hours) at 2025 1152  Last data filed at 2025 1045  Gross per 24 hour   Intake 0 ml   Output 0 ml   Net 0 ml     Wt Readings from Last 3 Encounters:   25 158 lb 4.6 oz (71.8 kg)   01/10/25 151 lb (68.5 kg)   24 157 lb (71.2 kg)       Physical Exam:   General: Alert and oriented x 3. No apparent distress. No respiratory or constitutional distress.  HEENT: Normocephalic, anicteric sclera, neck supple.  Neck: No JVD, carotids 2+, no bruits.  Cardiac: Irregularly irregular. S1, S2 normal. No murmur, pericardial rub, S3.  Lungs: Clear without wheezes, rales, rhonchi or dullness.  Normal excursions and effort.  Abdomen: Soft, non-tender. BS-present.  Extremities: Right extremity upper extremity bruised  Neurologic: Alert and oriented, normal affect.  Skin: Warm and dry.     Laboratory Data:  Lab Results   Component Value Date    WBC 6.2 2025    HGB 12.8 2025    HCT 38.1 2025    .0 2025    INR 8.0 2025    INR 8.01 2025    PTP 67.6 2025         Impression:  Supratherapeutic INR  Atrial fibrillation, persistent  Severe tricuspid valve regurgitation  Coronary disease  CHFmrEF  Peripheral vascular disease  Edema of the right upper extremity    Recommendations:  Hold Coumadin for now  Right upper extremity ultrasound pending  Hold vitamin K as well for now may consider allowing her INR to come down naturally.  DCCV timing to be determined  Resume home meds.  Cardiac monitoring    Thank you for allowing me to participate in the care of your patient.    Deena Gomez MD  2025  11:52 AM       [1] No Known Allergies

## 2025-02-28 NOTE — H&P
[facility]     Kera Reed Patient Status:  Inpatient    1945 MRN [id number]   Location [facility] 8NE-A Attending Deena Gomez MD   Hosp Day # 0 PCP Daya Chandler MD     Chief Complaint: R arm bruising    Subjective:    History of Present Illness:     Kera Reed is an 80 year old female with pmhx of chronic afib, severe TVR, MV CAD, chronic HFrEF, HTN, HLD, hypothyroidism who presents from her cardiologist office due to extensive RUE bruising and swelling. She and her  report that she has been having ongoing/progressive symptoms from her atrial fibrillation. She has noted significant decline in exercise tolerance, increased SOB/WEISS, and fatigue. She was recently started on amiodarone without improvement in her symptoms. She was in her cardiologist office today for planned MER/cardioversion. However, she was noted to have significant RUE bruising/swelling as well as a POC INR of 8.1. She was admitted to the hospital for further evaluation. She reports her INR is typically monitored monthly. Last chest was 2.6 earlier this month. She started amiodarone after her monthly check and has not had INR repeated until today. She denies any abdominal pain, other areas of significant bruising, melena/hematochezia/coffee ground emesis, oral mucosa oozing or bleeding. She first noted bruising of her RUE about 5 days ago. Bruising started near shoulder and has been extending further each day. Today, she noted the bruising into her fingers. She denies any numbness/tingling or severe pain. No recent LLE swelling, weight gain, PND    History/Other:    Past Medical History:  Past Medical History:    Allergic rhinitis    Congestive heart disease (HCC)    Coronary atherosclerosis    High blood pressure    High cholesterol    Hypothyroidism     Past Surgical History:   Past Surgical History:   Procedure Laterality Date    Bypass surgery      cardiac    Cabg      Colonoscopy   07/23/2018    Wilfredo Hyde    Hysterectomy      Knee replacement surgery        Family History:   Family History   Family history unknown: Yes     Social History:    reports that she quit smoking about 25 years ago. Her smoking use included cigarettes. She started smoking about 65 years ago. She has a 40 pack-year smoking history. She has never used smokeless tobacco. She reports that she does not currently use alcohol. She reports that she does not use drugs.     Allergies: Allergies[1]    Medications:  Medications Ordered Prior to Encounter[2]    Review of Systems:   A comprehensive review of systems was completed.    Pertinent positives and negatives noted in the HPI.    Objective:   Physical Exam:    BP 94/64 (BP Location: Left arm)   Pulse 76   Temp 97.7 °F (36.5 °C) (Oral)   Resp 20   Ht 5' 2\" (1.575 m)   Wt 158 lb 4.6 oz (71.8 kg)   LMP  (LMP Unknown)   SpO2 95%   BMI 28.95 kg/m²   General: No acute distress, Alert  Respiratory: No rhonchi, no wheezes  Cardiovascular: S1, S2. IRRR  Abdomen: Soft, Non-tender, non-distended, positive bowel sounds  Neuro: No new focal deficits  Extremities: No edema. RUE with significant ecchymosis from shoulder extending down to fingers     Results:    Labs:      Labs Last 24 Hours:    Recent Labs   Lab 02/28/25  0943   RBC 4.08   HGB 12.8   HCT 38.1   MCV 93.4   MCH 31.4   MCHC 33.6   RDW 18.3   WBC 6.2   .0       Recent Labs   Lab 02/28/25  1410   CREATSERUM 0.95   EGFRCR 60       Estimated Glomerular Filtration Rate: 60.3 mL/min/1.73m2 (by CKD-EPI based on SCr of 0.95 mg/dL).    Lab Results   Component Value Date    INR 8.0 (A) 02/28/2025    INR 8.01 (HH) 02/28/2025    INR 1.1 01/13/2025       No results for input(s): \"TROP\", \"TROPHS\", \"CK\" in the last 168 hours.    No results for input(s): \"TROP\", \"PBNP\" in the last 168 hours.    No results for input(s): \"PCT\" in the last 168 hours.    Imaging: Imaging data reviewed in Epic.    Assessment & Plan:       #Supratherapeutic INR  #RUE ecchymosis  -stat RUE venous doppler and stat CTA RUE ordered  -hold plavix and warfarin, trend daily INR  -hold on vitamin K unless active bleeding (hx of clotting disorder/Factor V Leiden)  -Hgb WNL  -follow CBC, transfuse Hgb < 7.0    #Persistent, symptomatic afib  -hold warfarin, metoprolol  -amiodarone taper  -plan for future DCCV  -cardiology consulted    #HTN  -hold metoprolol, losartan as BP's low normal for now    #HLD  -not on statin    #MV CAD with hx of CABG in 2012, PCI to LM/LAD 11/2024  -hold plavix given severe ecchymosis/concern for bleeding    #Chronic HFrEF, not in exacerbation  #Severe TVR  -most recent echo with EF 35-40%, basal inferior hypokinesis   -hold PTA bumex, losartan, metoprolol for now given lower BP's and possible bleeding    #Hypercoagulable state  #Factor V Leiden/APLS  #Hx of DVT/PE  -holding warfarin, daily INR    #DM2 with hyperglycemia  -HgbA1c 6.8%  -hold on hyperglycemia protocol/accuchecks unless glucose > 180    #Anxiety/depression  -buspirone, PRN xanax    #Hypothyroidism  -levothyroxine    Plan of care discussed with pt, pt's , RN, cardiology     Jeanna Yanes DO    Supplementary Documentation:     The 21st Century Cures Act makes medical notes like these available to patients in the interest of transparency. Please be advised this is a medical document. Medical documents are intended to carry relevant information, facts as evident, and the clinical opinion of the practitioner. The medical note is intended as peer to peer communication and may appear blunt or direct. It is written in medical language and may contain abbreviations or verbiage that are unfamiliar.                                       [1] No Known Allergies  [2]   Current Facility-Administered Medications on File Prior to Encounter   Medication Dose Route Frequency Provider Last Rate Last Admin    [COMPLETED] lidocaine PF (Xylocaine-MPF) 1 % injection              [COMPLETED] heparin (Porcine) 5000 UNIT/ML injection             [COMPLETED] midazolam (Versed) 2 MG/2ML injection             [COMPLETED] fentaNYL (Sublimaze) 50 mcg/mL injection             [COMPLETED] iopamidol 76% (ISOVUE-370) injection for power injector  100 mL Intravenous ONCE PRN Franco VillafuerteDELFINO bean   100 mL at 12/27/24 1120     Current Outpatient Medications on File Prior to Encounter   Medication Sig Dispense Refill    busPIRone 10 MG Oral Tab Take 1 tablet (10 mg total) by mouth in the morning and 1 tablet (10 mg total) before bedtime.      amiodarone 200 MG Oral Tab Take 1 tablet (200 mg total) by mouth 2 (two) times daily. 2/28 will start taking 200mg daily only      bumetanide 1 MG Oral Tab Take 1 tablet (1 mg total) by mouth BID (Diuretic).      Multiple Vitamins-Minerals (MULTIVITAMIN ADULTS 50+ OR) Take 1 tablet by mouth daily.      empagliflozin (JARDIANCE) 10 MG Oral Tab Take 1 tablet (10 mg total) by mouth daily.      Potassium Chloride ER 20 MEQ Oral Tab CR Take by mouth daily.      clopidogrel 75 MG Oral Tab Take 1 tablet (75 mg total) by mouth daily. 90 tablet 1    metoprolol succinate  MG Oral Tablet 24 Hr Take 1 tablet (100 mg total) by mouth in the morning and 1 tablet (100 mg total) before bedtime.      warfarin 5 MG Oral Tab Take 1 tablet (5 mg total) by mouth daily.      ALPRAZolam 0.25 MG Oral Tab Take 1 tablet (0.25 mg total) by mouth daily as needed.      losartan 25 MG Oral Tab Take 1 tablet (25 mg total) by mouth daily.      Levothyroxine Sodium 75 MCG Oral Tab Take 1 tablet (75 mcg total) by mouth once daily. 90 tablet 2

## 2025-02-28 NOTE — PLAN OF CARE
NURSING ADMISSION NOTE      Patient admitted via Wheelchair from cath lab at 1015.   Oriented to room.  Safety precautions initiated.  Bed in low position.  Call light in reach.  Admission navigator and medication reconciliation reviewed and completed with patient.     Pt alert, oriented x4. Oxygen saturation adequate on room air. Lung sounds diminished bilaterally. Tele: Atrial fibrillation, rates controlled. Right arm with swelling and bruising along the whole arm, generalized petechiae. Conjunctiva red. Continent. Denies pain. Ambulates standby with walker.  Plan of care: US RUE, INR reversal with vitamin K after US, MER/Cardioversion once appropriate, hospitalist to see. Pt and spouse updated on plan of care. Questions answered.     1020 - hospitalist consult paged.   1030 - received call from lab notifying of INR of 8.1. [facility] cardiology APRN notified, orders to hold Vitamin K, check US RUE before reversing INR.    Problem: Patient/Family Goals  Goal: Patient/Family Long Term Goal  Description: Patient's Long Term Goal: Discharge home    Interventions:  - Follow MD orders  - medications as ordered  - See additional Care Plan goals for specific interventions  Outcome: Progressing  Goal: Patient/Family Short Term Goal  Description: Patient's Short Term Goal: lower INR    Interventions:   - vitamin K  - US RUE  - See additional Care Plan goals for specific interventions  Outcome: Progressing

## 2025-02-28 NOTE — PROGRESS NOTES
Pt received for bedside MER/CV with Dr. Mcdowell and Dr. Gomez. Upon arrival to unit, pt right arm extremely swollen and dark purple in color. Pt states this started on Monday morning. Pt denies falls, injury to RUE. Dr. Gomez and Dr. Mcdowell notified of right arm edema, ecchymosis. Procedure cancelled. Bedside INR >8.0. Labs sent. Report called to Y[facility] ROSS on [id number]. Pt transferred to [facility] via wheelchair with all belongings.

## 2025-02-28 NOTE — PROGRESS NOTES
02/28/25 1020 02/28/25 1023 02/28/25 1025   Vital Signs   /78 107/78 125/84   MAP (mmHg) 88 88 96   BP Location Left arm Left arm Left arm   BP Method Automatic Automatic Automatic   Patient Position Lying Sitting Standing     Orthostatics. Pt asymptomatic.

## 2025-02-28 NOTE — HISTORICAL OFFICE NOTE
[facility]  [address] [place] [address]  [phone]      Kera Reed  Progress Note  Demographics:  Name: Kera Reed YOB: 1945  Age: 80, Female Medical Record No: [id number]  Visited Date/Time: 02/12/2025 09:50 AM    Chief Complaints  follow up  History of Present Illness  80-year-old female returns the office.  History of congestive heart failure with mildly reduced ejection fraction, coronary disease status post bypass, ischemic cardiomyopathy, paroxysmal atrial fibrillation, peripheral vascular disease, severe tricuspid regurgitation presents following her peripheral angiogram.  Patient has evidence of bilateral common femoral artery stenosis.  Patient was evaluated by vascular surgery who does not think she is a candidate for a vein patch at this time.    Today's visit  Patient returns the office.  She is evaluated by Dr. Moreland for possible tricuspid valve replacement.  He was deemed at this time she is not yet a candidate for percutaneous tricuspid valve replacement.  She has persistent atrial fibrillation that may be contributing to her symptoms.  Cardiac risk factors Former smoker  Past Medical History  1.Preop testing  2.Atrial fibrillation, persistent  3.Severe tricuspid valve regurgitation  4.PAD (peripheral artery disease)  5.HFrEF (heart failure with reduced ejection fraction)  6.Sore on leg  7.Venous insufficiency  8.History of CABG (coronary artery bypass graft) - Hx  9.CAD native (coronary artery disease)  10.Hypertension (HTN), primary  Family History  No significant family history noted.  Social History  Smoking status Former smoker  Tobacco usage - No (Ex-smoker (finding))  Review of systems  Cardiovascular No history of Chest pain, WEISS, Palpitations, Syncope, PND, Orthopnea, Edema and Claudication  Physical Examination  Vitals Left Arm Sitting  / 68 mmHg, Pulse rate 110 bpm, Height in 5' 2\", BMI: 28.7, Weight in 157 lbs (or)  71.21 kgs and BSA : 1.79 cc/m²  General Appearance No Acute Distress and Appropriate  Cardiovascular   EKG/Other abnormalities  General: AAO x 3, no distress  EOMI: PERRLA, no conjunctiva  Resp: CTAB, no wheezing, coarse breath sounds  Cardiac: S1, S2, irregularly irregular, no M/R/G,   GI: normal BS, soft, nontender  Ext: Bilateral varicose veins, ulcer on her right second toe  Neuro: no focal deficits  Allergies  No medication allergies noted.  Medications (Info obtained by: Verbal)  1.bumetanide 1 mg tablet, Take 1 tablet orally 2 times a day.  2.buPROPion HCL  mg tablet,12 hr sustained-release, Take 1 tablet orally once a day.  3.busPIRone 10 mg tablet, Take 1 tablet orally 2 times a day.  4.clopidogreL 75 mg tablet, Take 1 tablet orally once a day.  5.Jardiance 10 mg tablet, Take 1 tablet orally once a day.  6.levothyroxine 75 mcg tablet, Take 1 tablet orally once a day.  7.losartan 25 mg tablet, Take 1 tablet orally once a day.  8.Lyrica 50 mg capsule, Take 1 capsule orally 2 times a day.  9.metoprolol succinate  mg tablet,extended release 24 hr, Take 1 tablet orally 2 times a day.  10.potassium gluconate 500 mg (83 mg) tablet, Take 1 tablet orally once a day.  11.trifluridine 1 % eye drops, 1 drop (ophthalmic (eye)) every 4-6 hours.  12.valACYclovir 1 gram tablet, Take 1 tablet orally once a day.  13.warfarin 5 mg tablet, Take 1 tablet orally once a day.  14.Xanax 0.25 mg tablet, Take 1 tablet orally once a day as needed.  Impression  1.Atrial fibrillation, persistent  2.Severe tricuspid valve regurgitation  3.PAD (peripheral artery disease)  4.Venous insufficiency of both lower extremities  5.HFrEF (heart failure with reduced ejection fraction)  6.Class IV claudication  7.Cellulitis of lower leg  8.History of CABG (coronary artery bypass graft) - Hx  9.CAD native (coronary artery disease)  10.Hypertension (HTN), primary  Assessment & Plan  Coronary disease status post CABG x 3  [place] class III  angina, [place] heart association class II-III  Persistent atrial fibrillation  Severe TR  Congestive heart failure with reduced ejection fraction, chronic  Ischemic cardiomyopathy  Claudication RF Class III  Significant peripheral vascular disease  Venous Insufficiency, CEAP IV    Recommendations  Start amiodarone p.o.  Will give her a tapering dose.  Will then schedule patient for a DCCV without MER.  She has a follow-up with electrophysiology shortly thereafter.    Peripheral angiogram shows severe bilateral common femoral artery stenosis with calcifications with bilateral occluded and anterior tibial arteries and two-vessel runoff.  Not currently a candidate for vein patch procedure.  Patient sore on her right toe seems to be healing slowly.    Status post percutaneous intervention to the distal left main and ostial LAD as well as LAD with a drug-eluting stent.  At this time it has been a month since her stent was placed. Continue Plavix as well as Coumadin    Severe tricuspid valve regurgitation.  Patient's recent echo in February 2025 is consistent with severe tricuspid valve regurgitation and RV and RA enlargement.  F/u with Dr Moreland in 6 months    Patient is interested in possible left atrial appendage closure however patient was advised to discuss with her hematologist regarding the need for Coumadin.  She believes she may have been placed on Coumadin due to factor V Leiden after she was found to have a blood clot in her lower extremity  Continue beta-blocker as well as losartan.  Continue Bumex twice daily    Venous ultrasound for venous insufficiency pending, patient would like to wait until after resolution of her tricuspid valve  Your provider has advised for you to (continue) conservative therapy for the treatment of venous insufficiency and varicose veins. This includes wearing compression stockings (20-30mmHg knee high) during waking hours, elevating legs as needed for swelling, daily calf muscle  pumping exercises, and a weight reduction program with a goal BMI of ? 35. Avoid extend periods of standing and you may take analgesics for symptom relief.  Medications Ordered  1.amiodarone 200 mg tablet, Take 2 tablets orally 2 times a day for 7 days. 200mg twice a day for 7 days. after the two weeks once a day  Miscellaneous  1.Weight monitoring (regime/therapy)  Nurses documentation  Refills needed: none  Upcoming surgeries: no  Use of assistive device(s): none  Triage & medication list reviewed by: EC   Patient instructions  Start Amiodarone 400mg twice a day for 7 days,   200 mg twice a day for 7 days and 200mg once a day for 3 months    Cardioversion will call you to schedule the procedure   Follow up 2 weeks after procedure    Your provider has ordered a Cardioversion to be performed at [facility] on February 28,2025 by Dr. Gomez. This procedure is where an electrical shock is delivered to the heart to restore a normal heart rhythm. You will receive IV sedation before the procedure. You must be fasting- nothing to eat or drink after midnight the night prior to procedure.  It is always important to bring all your medications including over the counter medications, vitamins and supplements to your doctor visits. This will assist in providing the best care for your condition. All instructions and patient education will be provided by a cath lab nurse 24- 48 hours prior To procedure.   Make sure you do not skip a dose of your blood thinner   Please have your primary sent us the INR readings at      Lab Details  BASIC METABOLIC PANEL (8)  01/23/2025 09:39:19 AM  GLUCOSE 148 70-99 mg/dL H F  SODIUM 144 136-145 mmol/L  F  POTASSIUM 4.5 3.5-5.1 mmol/L  F  CHLORIDE 104  mmol/L  F  CO2 32.0 21.0-32.0 mmol/L  F  ANION GAP 8 0-18 mmol/L  F  BUN 31 9-23 mg/dL H F  CREATININE 1.38 0.55-1.02 mg/dL H F  CALCIUM 9.9 8.7-10.6 mg/dL  F  OSMOLALITY CALCULATED 307 275-295 mOsm/kg H F  E GFR CR 38 >=60  mL/min/1.73m2 L F  FASTING PATIENT BMP ANSWER Patient not present   F  CBC WITH DIFFERENTIAL WITH PLATELET  01/08/2025 05:00:43 PM  WBC 7.7 4.0-11.0 x10(3) uL  F  RED BLOOD COUNT 4.54 3.80-5.30 x10(6)uL  F  HGB 13.7 12.0-16.0 g/dL  F  HCT 43.8 35.0-48.0 %  F  PLATELETS 280.0 150.0-450.0 10(3)uL  F  MEAN CELL VOLUME 96.5 80.0-100.0 fL  F  MEAN CORPUSCULAR HEMOGLOBIN 30.2 26.0-34.0 pg  F  MEAN CORPUSCULAR HGB CONC 31.3 31.0-37.0 g/dL  F  RED CELL DISTRIBUTION WIDTH CV 18.5 %  F  NEUTROPHIL ABS PRELIM 5.11 1.50-7.70 x10 (3) uL  F  NEUTROPHIL ABSOLUTE 5.11 1.50-7.70 x10(3) uL  F  LYMPHOCYTE ABSOLUTE 1.53 1.00-4.00 x10(3) uL  F  MONOCYTE ABSOLUTE 0.89 0.10-1.00 x10(3) uL  F  EOSINOPHIL ABSOLUTE 0.05 0.00-0.70 x10(3) uL  F  BASOPHIL ABSOLUTE 0.03 0.00-0.20 x10(3) uL  F  IMMATURE GRANULOCYTE COUNT 0.04 0.00-1.00 x10(3) uL  F  NEUTROPHIL % 66.8 %  F  LYMPHOCYTE % 20.0 %  F  MONOCYTE % 11.6 %  F  EOSINOPHIL % 0.7 %  F  BASOPHIL % 0.4 %  F  IMMATURE GRANULOCYTE RATIO % 0.5 %  F  POCT GLUCOSE  11/18/2024 08:50:39 AM  POC GLUCOSE 93 70-99 mg/dL  F  CPOE Orders carried out by: Angeles Duggan and Deena Gomez MD  Care Providers: Epifanio Bocanegra MD, Hilda Allen and Deena Gomez MD  Electronically Authenticated by  Deena Gomez MD  02/14/2025 04:13:25 PM  Disclaimer: Components of this note were documented using voice recognition system and are subject to errors not corrected at proofreading. Contact the author of this note for any clarifications.

## 2025-03-01 ENCOUNTER — APPOINTMENT (OUTPATIENT)
Dept: CT IMAGING | Facility: HOSPITAL | Age: 80
End: 2025-03-01
Attending: INTERNAL MEDICINE
Payer: MEDICARE

## 2025-03-01 ENCOUNTER — APPOINTMENT (OUTPATIENT)
Facility: HOSPITAL | Age: 80
End: 2025-03-01
Attending: INTERNAL MEDICINE
Payer: MEDICARE

## 2025-03-01 PROBLEM — I48.91 A-FIB (HCC): Status: ACTIVE | Noted: 2025-03-01

## 2025-03-01 LAB
ANION GAP SERPL CALC-SCNC: 8 MMOL/L (ref 0–18)
BASOPHILS # BLD AUTO: 0.02 X10(3) UL (ref 0–0.2)
BASOPHILS NFR BLD AUTO: 0.4 %
BUN BLD-MCNC: 25 MG/DL (ref 9–23)
CALCIUM BLD-MCNC: 8.8 MG/DL (ref 8.7–10.6)
CHLORIDE SERPL-SCNC: 107 MMOL/L (ref 98–112)
CO2 SERPL-SCNC: 25 MMOL/L (ref 21–32)
CREAT BLD-MCNC: 0.86 MG/DL
EGFRCR SERPLBLD CKD-EPI 2021: 68 ML/MIN/1.73M2 (ref 60–?)
EOSINOPHIL # BLD AUTO: 0.05 X10(3) UL (ref 0–0.7)
EOSINOPHIL NFR BLD AUTO: 0.9 %
ERYTHROCYTE [DISTWIDTH] IN BLOOD BY AUTOMATED COUNT: 18.3 %
GLUCOSE BLD-MCNC: 99 MG/DL (ref 70–99)
HCT VFR BLD AUTO: 34.8 %
HGB BLD-MCNC: 11.7 G/DL
IMM GRANULOCYTES # BLD AUTO: 0.2 X10(3) UL (ref 0–1)
IMM GRANULOCYTES NFR BLD: 3.7 %
INR BLD: 5.57 (ref 0.8–1.2)
LYMPHOCYTES # BLD AUTO: 1.1 X10(3) UL (ref 1–4)
LYMPHOCYTES NFR BLD AUTO: 20.4 %
MCH RBC QN AUTO: 30.5 PG (ref 26–34)
MCHC RBC AUTO-ENTMCNC: 33.6 G/DL (ref 31–37)
MCV RBC AUTO: 90.9 FL
MONOCYTES # BLD AUTO: 0.49 X10(3) UL (ref 0.1–1)
MONOCYTES NFR BLD AUTO: 9.1 %
NEUTROPHILS # BLD AUTO: 3.54 X10 (3) UL (ref 1.5–7.7)
NEUTROPHILS # BLD AUTO: 3.54 X10(3) UL (ref 1.5–7.7)
NEUTROPHILS NFR BLD AUTO: 65.5 %
OSMOLALITY SERPL CALC.SUM OF ELEC: 294 MOSM/KG (ref 275–295)
PLATELET # BLD AUTO: 184 10(3)UL (ref 150–450)
POTASSIUM SERPL-SCNC: 4.6 MMOL/L (ref 3.5–5.1)
PROTHROMBIN TIME: 50.9 SECONDS (ref 11.6–14.8)
RBC # BLD AUTO: 3.83 X10(6)UL
SODIUM SERPL-SCNC: 140 MMOL/L (ref 136–145)
WBC # BLD AUTO: 5.4 X10(3) UL (ref 4–11)

## 2025-03-01 PROCEDURE — 73206 CT ANGIO UPR EXTRM W/O&W/DYE: CPT | Performed by: INTERNAL MEDICINE

## 2025-03-01 PROCEDURE — 99232 SBSQ HOSP IP/OBS MODERATE 35: CPT | Performed by: INTERNAL MEDICINE

## 2025-03-01 RX ADMIN — HYDROCODONE BITARTRATE AND ACETAMINOPHEN 1 TABLET: 5; 325 TABLET ORAL at 06:09:00

## 2025-03-01 RX ADMIN — AMIODARONE HYDROCHLORIDE 200 MG: 200 TABLET ORAL at 09:05:00

## 2025-03-01 RX ADMIN — BUSPIRONE HYDROCHLORIDE 10 MG: 5 TABLET ORAL at 19:47:00

## 2025-03-01 RX ADMIN — HYDROCODONE BITARTRATE AND ACETAMINOPHEN 1 TABLET: 5; 325 TABLET ORAL at 19:45:00

## 2025-03-01 RX ADMIN — HYDROCODONE BITARTRATE AND ACETAMINOPHEN 1 TABLET: 5; 325 TABLET ORAL at 11:53:00

## 2025-03-01 RX ADMIN — LEVOTHYROXINE SODIUM 75 MCG: 75 TABLET ORAL at 06:08:00

## 2025-03-01 RX ADMIN — BUSPIRONE HYDROCHLORIDE 10 MG: 5 TABLET ORAL at 09:05:00

## 2025-03-01 NOTE — PROGRESS NOTES
Progress Note  Kera Reed Patient Status:  Observation    1945 MRN [id number]   Location [facility] 8NE-A Attending Deena Gomez MD   Hosp Day # 0 PCP Daya Chandler MD     Subjective:  Reports feeling well. Continue bruising of RUE although not worse.  INR trending down to ~5 today.   Awaiting CTA once she has access  No chest pain or dyspnea.    Objective:  Physical Exam:   /74 (BP Location: Left arm)   Pulse 89   Temp 97.4 °F (36.3 °C) (Oral)   Resp 20   Ht 5' 2\" (1.575 m)   Wt 158 lb 4.6 oz (71.8 kg)   LMP  (LMP Unknown)   SpO2 95%   BMI 28.95 kg/m²   Temp (24hrs), Av.7 °F (36.5 °C), Min:97.4 °F (36.3 °C), Max:98 °F (36.7 °C)       Intake/Output Summary (Last 24 hours) at 3/1/2025 0945  Last data filed at 3/1/2025 0745  Gross per 24 hour   Intake 720 ml   Output 550 ml   Net 170 ml     Wt Readings from Last 3 Encounters:   25 158 lb 4.6 oz (71.8 kg)   01/10/25 151 lb (68.5 kg)   24 157 lb (71.2 kg)     Telemetry: NSR  General: Alert and oriented in no apparent distress comfortable bedside chair on RA.  HEENT: No focal deficits.  Neck: No JVD, carotids 2+ no bruits.  Cardiac: Regular rate and rhythm, S1, S2 normal, no murmur, rub or gallop.  Lungs: Clear without wheezes, rales, rhonchi or dullness.  Normal excursions and effort.  Abdomen: Soft, non-tender.   Extremities: Without clubbing, cyanosis or edema.  Peripheral pulses are 2+.  Neurologic: Alert and oriented, normal affect.  Skin: Warm and dry. Considerable ecchymosis and edema of RUE from axilla to mid arm. Pulses intact. Sensation and motricity intact      Intake/Output:    Intake/Output Summary (Last 24 hours) at 3/1/2025 0813  Last data filed at 3/1/2025 0745  Gross per 24 hour   Intake 720 ml   Output 550 ml   Net 170 ml       Last 3 Weights   25 1025 158 lb 4.6 oz (71.8 kg)   25 1000 150 lb (68 kg)   01/10/25 1419 151 lb (68.5 kg)   24 1430 157 lb (71.2 kg)       Labs:  Recent Labs    Lab 02/28/25  1410 03/01/25  0535   GLU  --  99   BUN  --  25*   CREATSERUM 0.95 0.86   EGFRCR 60 68   CA  --  8.8   NA  --  140   K  --  4.6   CL  --  107   CO2  --  25.0     Recent Labs   Lab 02/28/25  0943 03/01/25  0535   RBC 4.08 3.83   HGB 12.8 11.7*   HCT 38.1 34.8*   MCV 93.4 90.9   MCH 31.4 30.5   MCHC 33.6 33.6   RDW 18.3 18.3   NEPRELIM  --  3.54   WBC 6.2 5.4   .0 184.0         No results for input(s): \"TROP\", \"TROPHS\", \"CK\" in the last 168 hours.    Diagnostics:   CTA UE 3/1: PENDING    ECHOCARDIOGRAM 2/4/2025:  Conclusions:   1. Left ventricle: The cavity size was normal. Wall thickness was normal.      Systolic function was at the lower limits of normal. The estimated      ejection fraction was 50-55%. Unable to assess LV diastolic function due  to heart rhythm.   2. Right ventricle: The cavity size was moderately to markedly increased.      Systolic function was reduced.   3. Left atrium: The left atrial volume was moderately increased.   4. Right atrium: The atrium was massively dilated.   5. Ascending aorta: The ascending aorta was 3.7cm diameter.   6. Mitral valve: There was mild regurgitation.   7. Tricuspid valve: There was malcoaptation of the valve leaflets. There was      severe regurgitation.   Impressions:  No previous study from [facility] was   available for comparison.     US Venous 2/28:  1. Negative for deep venous thrombosis of the right upper extremity.   2. Moderate subcutaneous edema of the proximal right upper extremity, additionally with a hypoechoic, nonvascular process within the subcutaneous tissues of the proximal/mid upper extremity (12.0 x 3.5 x 3.4 cm), possibly an evolving hematoma.   3. Incidental findings of right glenohumeral joint effusion and subacromial/subdeltoid bursitis.         Medications:   [Held by provider] phytonadione  2.5 mg Oral Once    busPIRone  10 mg Oral BID    levothyroxine  75 mcg Oral QAM AC    amiodarone  200 mg Oral  Daily      sodium chloride Stopped (02/28/25 1045)       Assessment/Plan:    Supratherapeutic INR  Trending downwards to ~5 today  RUE ecchymosis w/ concern for hematoma on US  Follow CTA RUE - pending access- was ordered STAT  Hgb Mildly reduced this AM, repeat this afternoon  Hold plavix and Warfarin - Vitamin K if worsening/continued hgb reduction  Persistent afib  Plan for DCCV eventually  Amiodarone taper  Warfarin held, metoprolol  LVEF 50-55% 2/5/2025  Multivessel CAD s/p CABG in 2012 and PCI to LM/LAD in 11/2024  Plavix on hold - no chest pain  LV preserved February 2025  Severe TR  Compensated  MER report from 12/6/2024 reviewed  HTN  BP at goal  HLD  Consider statin  Hx of Tobacco abuse w/ 40 year pack hx  Hx of DVT/PE, Factor V Leiden/APLS  Warfarin held as above  DM II  Hypothyroidism    PLAN  Follow CTA RUE  Consider vascular consult  Follow INR - consider Vitamin K if worsening  Hold warfarin and Plavix  Continue amiodarone while therapeutic        Kolton Arita PA-C  3/1/2025  8:13 AM

## 2025-03-01 NOTE — PLAN OF CARE
Pt alert and oriented x4, Conjunctiva improving, O2 sat dropped to 86% place on oxygen 2L nc during the night. Lungs sound diminished, A-Fib on tele, denies chest pain, Right arm improving slightly. Continent of bowel/ bladder. SBA with a walker. Plan of care updated and all question answered.        Problem: SKIN/TISSUE INTEGRITY - ADULT  Goal: Skin integrity remains intact  Description: INTERVENTIONS  - Assess and document risk factors for pressure ulcer development  - Assess and document skin integrity  - Monitor for areas of redness and/or skin breakdown  - Initiate interventions, skin care algorithm/standards of care as needed  Outcome: Progressing     Problem: SKIN/TISSUE INTEGRITY - ADULT  Goal: Incision(s), wounds(s) or drain site(s) healing without S/S of infection  Description: INTERVENTIONS:  - Assess and document risk factors for pressure ulcer development  - Assess and document skin integrity  - Assess and document dressing/incision, wound bed, drain sites and surrounding tissue  - Implement wound care per orders  - Initiate isolation precautions as appropriate  - Initiate Pressure Ulcer prevention bundle as indicated  Outcome: Progressing     Problem: SKIN/TISSUE INTEGRITY - ADULT  Goal: Oral mucous membranes remain intact  Description: INTERVENTIONS  - Assess oral mucosa and hygiene practices  - Implement preventative oral hygiene regimen  - Implement oral medicated treatments as ordered  Outcome: Progressing

## 2025-03-01 NOTE — PROGRESS NOTES
[facility]   part of Merg[facility] with [facility]     Hospitalist Progress Note     Kera Reed Patient Status:  Observation    1945 MRN [id number]   Location [facility] 8NE-A Attending Deena Gomez MD   Hosp Day # 0 PCP Daya Chandler MD     Chief Complaint: R arm bruising    Subjective:     Patient feeling better this morning, feels she will do better on few medications. Bruising is about the same, having a little pain around the axilla where ultrasound was done yesterday.    Objective:    Review of Systems:   A comprehensive review of systems was completed; pertinent positive and negatives stated in subjective.    Vital signs:  Temp:  [97.4 °F (36.3 °C)-98.3 °F (36.8 °C)] 97.4 °F (36.3 °C)  Pulse:  [] 89  Resp:  [16-20] 20  BP: ()/(60-95) 104/74  SpO2:  [94 %-100 %] 95 %    Physical Exam:    General: No acute distress  Respiratory: No wheezes, no rhonchi  Cardiovascular: S1, S2, regular rate and rhythm  Abdomen: Soft, Non-tender, non-distended, positive bowel sounds  Neuro: No new focal deficits.   Extremities: No edema. Extensive RUE ecchymosis and edema from shoulder to fingers     Diagnostic Data:    Labs:  Recent Labs   Lab 25  0943 25  0957 25  0535   WBC 6.2  --  5.4   HGB 12.8  --  11.7*   MCV 93.4  --  90.9   .0  --  184.0   INR 8.01* 8.0* 5.57*       Recent Labs   Lab 25  1410 25  0535   GLU  --  99   BUN  --  25*   CREATSERUM 0.95 0.86   CA  --  8.8   NA  --  140   K  --  4.6   CL  --  107   CO2  --  25.0       Estimated Glomerular Filtration Rate: 68 mL/min/1.73m2 (by CKD-EPI based on SCr of 0.86 mg/dL).    No results for input(s): \"TROP\", \"TROPHS\", \"CK\" in the last 168 hours.    Recent Labs   Lab 25  0943 25  0957 25  0535   PTP 67.6*  --  50.9*   INR 8.01* 8.0* 5.57*        Microbiology    No results found for this visit on 25.      Imaging: Reviewed in Epic.    Medications:    [Held by provider] phytonadione  2.5 mg  Oral Once    busPIRone  10 mg Oral BID    levothyroxine  75 mcg Oral QAM AC    amiodarone  200 mg Oral Daily       Assessment & Plan:      #Supratherapeutic INR  #RUE ecchymosis  -stat RUE venous doppler negative for DVT, shows possible hematoma   -CTA RUE with hematoma, but no active extravasation noted  -hold plavix and warfarin, trend daily INR  -hold on vitamin K unless active bleeding (hx of clotting disorder/Factor V Leiden)  -follow CBC, transfuse Hgb < 7.0    #Acute blood loss anemia  -mild drop in Hgb to 11.7  -CTA RUE with hematoma, but no active extravasation noted  -follow CBC     #Persistent, symptomatic afib  -hold warfarin, metoprolol  -amiodarone taper  -plan for future DCCV  -cardiology consulted     #HTN  -hold metoprolol, losartan as BP's low normal for now     #HLD  -not on statin     #MV CAD with hx of CABG in 2012, PCI to LM/LAD 11/2024  -holding plavix given severe ecchymosis/concern for bleeding     #Chronic HFrEF, not in exacerbation  #Severe TVR  -most recent echo with EF 35-40%, basal inferior hypokinesis   -hold PTA bumex, losartan, metoprolol for now given lower BP's and possible bleeding     #Hypercoagulable state  #Factor V Leiden/APLS  #Hx of DVT/PE  -holding warfarin, daily INR     #DM2 with hyperglycemia  -HgbA1c 6.8%  -hold on hyperglycemia protocol/accuchecks unless glucose > 180     #Anxiety/depression  -buspirone, PRN xanax     #Hypothyroidism  -levothyroxine      Jeanna Yanes,     Supplementary Documentation:     Quality:  DVT Mechanical Prophylaxis:   SCDs,    DVT Pharmacologic Prophylaxis   Medication   None                Code Status: Not on file  Franks: No urinary catheter in place  Franks Duration (in days):   Central line:    LACHELLE:     Discharge is dependent on: clinical state  At this point Ms. Reed is expected to be discharge to: home    The 21st Century Cures Act makes medical notes like these available to patients in the interest of transparency. Please be  advised this is a medical document. Medical documents are intended to carry relevant information, facts as evident, and the clinical opinion of the practitioner. The medical note is intended as peer to peer communication and may appear blunt or direct. It is written in medical language and may contain abbreviations or verbiage that are unfamiliar.

## 2025-03-01 NOTE — PHYSICAL THERAPY NOTE
PT orders received and chart reviewed. Pt with elevated INR 5.57. Will hold. Will follow and re-attempt as able and appropriate.    Patient notified during office visit

## 2025-03-01 NOTE — PLAN OF CARE
Received patient at 0730. Alert and Oriented x4. Tele Rhythm Afib with BBB. O2 saturation 96% On room air. Breath sounds clear. Bed is locked and in low position. Call light and personal items within reach. No C/O chest pain or shortness of breath. Pt voiding with no issue. Pt tolerating ambulating well with assist and walker. Right arm swollen/bruised from underarm, shoulder, down arm to hand. CT done after IV access obtained and results called to Dr Yanes, continue to monitor for now.  Reviewed plan of care and patient verbalizes understanding.      Problem: Patient/Family Goals  Goal: Patient/Family Long Term Goal  Description: Patient's Long Term Goal: Discharge home    Interventions:  - Follow MD orders  - medications as ordered  - See additional Care Plan goals for specific interventions  Outcome: Progressing  Goal: Patient/Family Short Term Goal  Description: Patient's Short Term Goal: lower INR    Interventions:   - vitamin K  - US RUE  - See additional Care Plan goals for specific interventions  Outcome: Progressing     Problem: SKIN/TISSUE INTEGRITY - ADULT  Goal: Skin integrity remains intact  Description: INTERVENTIONS  - Assess and document risk factors for pressure ulcer development  - Assess and document skin integrity  - Monitor for areas of redness and/or skin breakdown  - Initiate interventions, skin care algorithm/standards of care as needed  Outcome: Progressing  Goal: Incision(s), wounds(s) or drain site(s) healing without S/S of infection  Description: INTERVENTIONS:  - Assess and document risk factors for pressure ulcer development  - Assess and document skin integrity  - Assess and document dressing/incision, wound bed, drain sites and surrounding tissue  - Implement wound care per orders  - Initiate isolation precautions as appropriate  - Initiate Pressure Ulcer prevention bundle as indicated  Outcome: Progressing  Goal: Oral mucous membranes remain intact  Description: INTERVENTIONS  -  Assess oral mucosa and hygiene practices  - Implement preventative oral hygiene regimen  - Implement oral medicated treatments as ordered  Outcome: Progressing     Problem: CARDIOVASCULAR - ADULT  Goal: Maintains optimal cardiac output and hemodynamic stability  Description: INTERVENTIONS:  - Monitor vital signs, rhythm, and trends  - Monitor for bleeding, hypotension and signs of decreased cardiac output  - Evaluate effectiveness of vasoactive medications to optimize hemodynamic stability  - Monitor arterial and/or venous puncture sites for bleeding and/or hematoma  - Assess quality of pulses, skin color and temperature  - Assess for signs of decreased coronary artery perfusion - ex. Angina  - Evaluate fluid balance, assess for edema, trend weights  Outcome: Progressing  Goal: Absence of cardiac arrhythmias or at baseline  Description: INTERVENTIONS:  - Continuous cardiac monitoring, monitor vital signs, obtain 12 lead EKG if indicated  - Evaluate effectiveness of antiarrhythmic and heart rate control medications as ordered  - Initiate emergency measures for life threatening arrhythmias  - Monitor electrolytes and administer replacement therapy as ordered  Outcome: Progressing

## 2025-03-02 LAB
HGB BLD-MCNC: 11.5 G/DL
INR BLD: 4.1 (ref 0.8–1.2)
PROTHROMBIN TIME: 40 SECONDS (ref 11.6–14.8)

## 2025-03-02 PROCEDURE — 99232 SBSQ HOSP IP/OBS MODERATE 35: CPT | Performed by: INTERNAL MEDICINE

## 2025-03-02 RX ORDER — BUMETANIDE 1 MG/1
1 TABLET ORAL
Status: DISCONTINUED | OUTPATIENT
Start: 2025-03-02 | End: 2025-03-04

## 2025-03-02 RX ORDER — ROSUVASTATIN CALCIUM 20 MG/1
20 TABLET, COATED ORAL NIGHTLY
Status: DISCONTINUED | OUTPATIENT
Start: 2025-03-02 | End: 2025-03-04

## 2025-03-02 RX ADMIN — HYDROCODONE BITARTRATE AND ACETAMINOPHEN 2 TABLET: 5; 325 TABLET ORAL at 21:10:00

## 2025-03-02 RX ADMIN — BUMETANIDE 1 MG: 1 TABLET ORAL at 16:06:00

## 2025-03-02 RX ADMIN — BUSPIRONE HYDROCHLORIDE 10 MG: 5 TABLET ORAL at 08:48:00

## 2025-03-02 RX ADMIN — ROSUVASTATIN CALCIUM 20 MG: 20 TABLET, COATED ORAL at 21:10:00

## 2025-03-02 RX ADMIN — BUSPIRONE HYDROCHLORIDE 10 MG: 5 TABLET ORAL at 21:09:00

## 2025-03-02 RX ADMIN — HYDROCODONE BITARTRATE AND ACETAMINOPHEN 2 TABLET: 5; 325 TABLET ORAL at 08:47:00

## 2025-03-02 RX ADMIN — LEVOTHYROXINE SODIUM 75 MCG: 75 TABLET ORAL at 05:26:00

## 2025-03-02 RX ADMIN — AMIODARONE HYDROCHLORIDE 200 MG: 200 TABLET ORAL at 08:48:00

## 2025-03-02 RX ADMIN — HYDROCODONE BITARTRATE AND ACETAMINOPHEN 1 TABLET: 5; 325 TABLET ORAL at 03:02:00

## 2025-03-02 RX ADMIN — HYDROCODONE BITARTRATE AND ACETAMINOPHEN 2 TABLET: 5; 325 TABLET ORAL at 16:05:00

## 2025-03-02 NOTE — PLAN OF CARE
Received patient at 0730. Alert and Oriented x4. Tele Rhythm Afib with BBB. O2 saturation 96% On room air. Breath sounds clear. Bed is locked and in low position. Call light and personal items within reach. Pt voiding with no issue. Pt ambulating with SBA. Right arm bruised and swollen from under armpit, shoulder down arm to hand,  is improved from yesterday, especially right hand. Is elevated on 2 pillows. INR 4.10 and hgb 11.5 today. Reviewed plan of care and patient verbalizes understanding.      Problem: Patient/Family Goals  Goal: Patient/Family Long Term Goal  Description: Patient's Long Term Goal: Discharge home    Interventions:  - Follow MD orders  - medications as ordered  - See additional Care Plan goals for specific interventions  Outcome: Progressing  Goal: Patient/Family Short Term Goal  Description: Patient's Short Term Goal: lower INR    Interventions:   - vitamin K  - US RUE  - See additional Care Plan goals for specific interventions  Outcome: Progressing     Problem: SKIN/TISSUE INTEGRITY - ADULT  Goal: Skin integrity remains intact  Description: INTERVENTIONS  - Assess and document risk factors for pressure ulcer development  - Assess and document skin integrity  - Monitor for areas of redness and/or skin breakdown  - Initiate interventions, skin care algorithm/standards of care as needed  Outcome: Progressing  Goal: Incision(s), wounds(s) or drain site(s) healing without S/S of infection  Description: INTERVENTIONS:  - Assess and document risk factors for pressure ulcer development  - Assess and document skin integrity  - Assess and document dressing/incision, wound bed, drain sites and surrounding tissue  - Implement wound care per orders  - Initiate isolation precautions as appropriate  - Initiate Pressure Ulcer prevention bundle as indicated  Outcome: Progressing  Goal: Oral mucous membranes remain intact  Description: INTERVENTIONS  - Assess oral mucosa and hygiene practices  - Implement  preventative oral hygiene regimen  - Implement oral medicated treatments as ordered  Outcome: Progressing     Problem: CARDIOVASCULAR - ADULT  Goal: Maintains optimal cardiac output and hemodynamic stability  Description: INTERVENTIONS:  - Monitor vital signs, rhythm, and trends  - Monitor for bleeding, hypotension and signs of decreased cardiac output  - Evaluate effectiveness of vasoactive medications to optimize hemodynamic stability  - Monitor arterial and/or venous puncture sites for bleeding and/or hematoma  - Assess quality of pulses, skin color and temperature  - Assess for signs of decreased coronary artery perfusion - ex. Angina  - Evaluate fluid balance, assess for edema, trend weights  Outcome: Progressing  Goal: Absence of cardiac arrhythmias or at baseline  Description: INTERVENTIONS:  - Continuous cardiac monitoring, monitor vital signs, obtain 12 lead EKG if indicated  - Evaluate effectiveness of antiarrhythmic and heart rate control medications as ordered  - Initiate emergency measures for life threatening arrhythmias  - Monitor electrolytes and administer replacement therapy as ordered  Outcome: Progressing

## 2025-03-02 NOTE — PROGRESS NOTES
Progress Note  Kera Reed Patient Status:  Observation    1945 MRN [id number]   Location [facility] 8NE-A Attending Deena Gomez MD   Hosp Day # 1 PCP Daya Chandler MD     Subjective:  Reports feeling well. Continue bruising of RUE with improvement distally.  Sensation and motor function intact.  INR improving  No chest pain or dyspnea including with ambulation    Objective:  Physical Exam:   /73 (BP Location: Left arm)   Pulse 88   Temp 98.4 °F (36.9 °C) (Oral)   Resp 20   Ht 5' 2\" (1.575 m)   Wt 158 lb 4.6 oz (71.8 kg)   LMP  (LMP Unknown)   SpO2 98%   BMI 28.95 kg/m²   Temp (24hrs), Av.9 °F (36.6 °C), Min:97 °F (36.1 °C), Max:98.4 °F (36.9 °C)       Intake/Output Summary (Last 24 hours) at 3/2/2025 0849  Last data filed at 3/2/2025 0824  Gross per 24 hour   Intake 1440 ml   Output 1050 ml   Net 390 ml     Wt Readings from Last 3 Encounters:   25 158 lb 4.6 oz (71.8 kg)   01/10/25 151 lb (68.5 kg)   24 157 lb (71.2 kg)     Telemetry: afib in the 70s  General: Alert and oriented in no apparent distress comfortable bedside chair on RA.  HEENT: No focal deficits.  Neck: No JVD, carotids 2+ no bruits.  Cardiac: Irregularly irregular, S1, S2 normal, rub or gallop.  Lungs: Clear without wheezes, rales, rhonchi or dullness.  Normal excursions and effort.  Abdomen: Soft, non-tender.   Extremities: Without clubbing, cyanosis or edema.  Peripheral pulses are 2+.  Neurologic: Alert and oriented, normal affect.  Skin: Warm and dry. Considerable ecchymosis and edema of RUE from axilla to mid arm. Pulses intact. Sensation and motricity intact      Intake/Output:    Intake/Output Summary (Last 24 hours) at 3/2/2025 0849  Last data filed at 3/2/2025 0824  Gross per 24 hour   Intake 1440 ml   Output 1050 ml   Net 390 ml       Last 3 Weights   25 1227 158 lb 4.6 oz (71.8 kg)   25 1025 158 lb 4.6 oz (71.8 kg)   25 1000 150 lb (68 kg)   01/10/25 1419 151 lb (68.5  kg)   12/03/24 1430 157 lb (71.2 kg)       Labs:  Recent Labs   Lab 02/28/25  1410 03/01/25  0535   GLU  --  99   BUN  --  25*   CREATSERUM 0.95 0.86   EGFRCR 60 68   CA  --  8.8   NA  --  140   K  --  4.6   CL  --  107   CO2  --  25.0     Recent Labs   Lab 02/28/25  0943 03/01/25  0535   RBC 4.08 3.83   HGB 12.8 11.7*   HCT 38.1 34.8*   MCV 93.4 90.9   MCH 31.4 30.5   MCHC 33.6 33.6   RDW 18.3 18.3   NEPRELIM  --  3.54   WBC 6.2 5.4   .0 184.0         No results for input(s): \"TROP\", \"TROPHS\", \"CK\" in the last 168 hours.    Diagnostics:   CTA UE 3/1: FINDINGS:    Right upper extremity CT angiography demonstrates a hematoma in the biceps measuring up to 6.2 x 3.9 cm (image 429) by 10.9 centimeters in length.  No evidence of active extravasation is noted.  Multiple fluid levels are noted compatible with   organization of the hematoma.  Hyperdense foci within the mid Muriel likely represents subacute hemorrhage.  Subcutaneous edema in the right arm is noted.      No significant stenosis on CT angiography of the right upper extremity is noted.      No acute fracture or dislocation.  Degenerative changes in the right glenohumeral joint are noted with complex appearing bursal fluid around the right joint.  Right joint effusion is noted.     ECHOCARDIOGRAM 2/4/2025:  1. Left ventricle: The cavity size was normal. Wall thickness was normal.      Systolic function was at the lower limits of normal. The estimated      ejection fraction was 50-55%. Unable to assess LV diastolic function due  to heart rhythm.   2. Right ventricle: The cavity size was moderately to markedly increased.      Systolic function was reduced.   3. Left atrium: The left atrial volume was moderately increased.   4. Right atrium: The atrium was massively dilated.   5. Ascending aorta: The ascending aorta was 3.7cm diameter.   6. Mitral valve: There was mild regurgitation.   7. Tricuspid valve: There was malcoaptation of the valve leaflets. There was       severe regurgitation.   Impressions:  No previous study from [facility] was   available for comparison.     US Venous 2/28:  1. Negative for deep venous thrombosis of the right upper extremity.   2. Moderate subcutaneous edema of the proximal right upper extremity, additionally with a hypoechoic, nonvascular process within the subcutaneous tissues of the proximal/mid upper extremity (12.0 x 3.5 x 3.4 cm), possibly an evolving hematoma.   3. Incidental findings of right glenohumeral joint effusion and subacromial/subdeltoid bursitis.         Medications:   [Held by provider] phytonadione  2.5 mg Oral Once    busPIRone  10 mg Oral BID    levothyroxine  75 mcg Oral QAM AC    amiodarone  200 mg Oral Daily           Assessment/Plan:    Supratherapeutic INR  Trending downwards to ~4 today from 5 and 8 prior  RUE hematoma without apparent active extravasation on CTA RUE 3/1  Improvement on exam  Follow hgb this AM  Hold plavix and Warfarin - Vitamin K if worsening/continued hgb reduction  Persistent afib with controlled ventricular rates  Plan for DCCV eventually  Amiodarone taper  Warfarin held, metoprolol  LVEF 50-55% 2/5/2025  Multivessel CAD s/p CABG in 2012 and PCI to LM/LAD in 11/2024  Plavix on hold - no chest pain  LV preserved February 2025  Severe TR  Compensated  MER report from 12/6/2024 reviewed  HTN  BP at goal  HLD  Consider statin  Hx of Tobacco abuse w/ 40 year pack hx  Hx of DVT/PE, Factor V Leiden/APLS  Warfarin held as above  DM II  Hypothyroidism    PLAN  Follow INR's and hgb's  Consider vascular consult especially with worsening  Follow improving  INR - consider Vitamin K if worsening  Hold warfarin and Plavix for now  Start statin for CAD and PAD  Continue amiodarone while therapeutic then revisit rhythm management discussion        Kolton Arita PA-C  3/1/2025  8:13 AM       I have personally performed the medical decision making in its entirety. My additions include:    -  Holding warfarin and plavix until INR normalizes  - No active bleeding on CT, arm improving  - Continue amio. Hold off on DCCV for now    Mac Hernández MD  Cardiac Electrophysiology  [facility]

## 2025-03-02 NOTE — PROGRESS NOTES
[facility]   part of [facility]     Hospitalist Progress Note     Kera Reed Patient Status:  Observation    1945 MRN [id number]   Location [facility] 8NE-A Attending Deena Gomez MD   Hosp Day # 1 PCP Daya Chandler MD     Chief Complaint: R arm bruising    Subjective:     Resting in bed, swelling RUE mildly improved. Encouraged her to continue to elevate    Objective:    Review of Systems:   A comprehensive review of systems was completed; pertinent positive and negatives stated in subjective.    Vital signs:  Temp:  [97.5 °F (36.4 °C)-98.4 °F (36.9 °C)] 97.7 °F (36.5 °C)  Pulse:  [79-98] 89  Resp:  [18-20] 19  BP: ()/(69-86) 126/85  SpO2:  [96 %-99 %] 99 %    Physical Exam:    General: No acute distress  Respiratory: No wheezes, no rhonchi  Cardiovascular: S1, S2, regular rate and rhythm  Abdomen: Soft, Non-tender, non-distended, positive bowel sounds  Neuro: No new focal deficits.   Extremities: trace BLLE edema. Extensive RUE ecchymosis and edema from shoulder to fingers     Diagnostic Data:    Labs:  Recent Labs   Lab 25  0943 25  0957 25  0535 25  0503 25  0941   WBC 6.2  --  5.4  --   --    HGB 12.8  --  11.7*  --  11.5*   MCV 93.4  --  90.9  --   --    .0  --  184.0  --   --    INR 8.01* 8.0* 5.57* 4.10*  --        Recent Labs   Lab 25  1410 25  0535   GLU  --  99   BUN  --  25*   CREATSERUM 0.95 0.86   CA  --  8.8   NA  --  140   K  --  4.6   CL  --  107   CO2  --  25.0       Estimated Glomerular Filtration Rate: 68 mL/min/1.73m2 (by CKD-EPI based on SCr of 0.86 mg/dL).    No results for input(s): \"TROP\", \"TROPHS\", \"CK\" in the last 168 hours.    Recent Labs   Lab 25  0943 25  0957 25  0535 25  0503   PTP 67.6*  --  50.9* 40.0*   INR 8.01* 8.0* 5.57* 4.10*        Microbiology    No results found for this visit on 25.      Imaging: Reviewed in Epic.    Medications:    rosuvastatin  20 mg Oral  Nightly    bumetanide  1 mg Oral BID (Diuretic)    [Held by provider] phytonadione  2.5 mg Oral Once    busPIRone  10 mg Oral BID    levothyroxine  75 mcg Oral QAM AC    amiodarone  200 mg Oral Daily       Assessment & Plan:      #Supratherapeutic INR  #RUE ecchymosis  -stat RUE venous doppler negative for DVT, shows possible hematoma   -CTA RUE with hematoma, but no active extravasation noted  -holding plavix and warfarin, trend daily INR  -hold on vitamin K unless active bleeding (hx of clotting disorder/Factor V Leiden)  -follow CBC, transfuse Hgb < 7.0    #Acute blood loss anemia d/t above  -mild drop in Hgb to 11.7  -CTA RUE with hematoma, but no active extravasation noted  -follow CBC     #Persistent, symptomatic afib  -hold warfarin, metoprolol  -amiodarone taper  -plan for future DCCV  -cardiology following     #HTN  -hold metoprolol, losartan as BP's low normal for now     #HLD  -not on statin     #MV CAD with hx of CABG in 2012, PCI to LM/LAD 11/2024  -holding plavix given severe ecchymosis/concern for bleeding     #Chronic HFrEF, not in exacerbation  #Severe TVR  -most recent echo with EF 35-40%, basal inferior hypokinesis   -resume bumex  -continue holding PTA losartan, metoprolol for now given lower BP's      #Hypercoagulable state  #Factor V Leiden/APLS  #Hx of DVT/PE  -holding warfarin, daily INR     #DM2 with hyperglycemia  -HgbA1c 6.8%  -hold on hyperglycemia protocol/accuchecks unless glucose > 180     #Anxiety/depression  -buspirone, PRN xanax     #Hypothyroidism  -levothyroxine      Jeanna Yanes, DO    Supplementary Documentation:     Quality:  DVT Mechanical Prophylaxis:   SCDs,    DVT Pharmacologic Prophylaxis   Medication   None                Code Status: Not on file  Franks: No urinary catheter in place  Franks Duration (in days):   Central line:    LACHELLE:     Discharge is dependent on: clinical state  At this point Ms. Reed is expected to be discharge to: home    The 21st Century Cures  Act makes medical notes like these available to patients in the interest of transparency. Please be advised this is a medical document. Medical documents are intended to carry relevant information, facts as evident, and the clinical opinion of the practitioner. The medical note is intended as peer to peer communication and may appear blunt or direct. It is written in medical language and may contain abbreviations or verbiage that are unfamiliar.

## 2025-03-02 NOTE — PLAN OF CARE
Received patient 2300, Pt A&Ox4 o2>90% on room air, pt states pain/discomfort in right arm. Right arm bruised/swollen pt states improvement, norco given for pain, right arm elevated on pillows to decrease swelling,  pt has mild generalized edema with bruising, Pt denies SOB, CP, dizziness or heart palpitation.    Problem: Patient/Family Goals  Goal: Patient/Family Long Term Goal  Description: Patient's Long Term Goal: Discharge home    Interventions:  - Follow MD orders  - medications as ordered  - See additional Care Plan goals for specific interventions  Outcome: Progressing  Goal: Patient/Family Short Term Goal  Description: Patient's Short Term Goal: lower INR    Interventions:   - vitamin K  - US RUE  - See additional Care Plan goals for specific interventions  Outcome: Progressing     Problem: SKIN/TISSUE INTEGRITY - ADULT  Goal: Skin integrity remains intact  Description: INTERVENTIONS  - Assess and document risk factors for pressure ulcer development  - Assess and document skin integrity  - Monitor for areas of redness and/or skin breakdown  - Initiate interventions, skin care algorithm/standards of care as needed  Outcome: Progressing  Goal: Incision(s), wounds(s) or drain site(s) healing without S/S of infection  Description: INTERVENTIONS:  - Assess and document risk factors for pressure ulcer development  - Assess and document skin integrity  - Assess and document dressing/incision, wound bed, drain sites and surrounding tissue  - Implement wound care per orders  - Initiate isolation precautions as appropriate  - Initiate Pressure Ulcer prevention bundle as indicated  Outcome: Progressing  Goal: Oral mucous membranes remain intact  Description: INTERVENTIONS  - Assess oral mucosa and hygiene practices  - Implement preventative oral hygiene regimen  - Implement oral medicated treatments as ordered  Outcome: Progressing     Problem: CARDIOVASCULAR - ADULT  Goal: Maintains optimal cardiac output and  hemodynamic stability  Description: INTERVENTIONS:  - Monitor vital signs, rhythm, and trends  - Monitor for bleeding, hypotension and signs of decreased cardiac output  - Evaluate effectiveness of vasoactive medications to optimize hemodynamic stability  - Monitor arterial and/or venous puncture sites for bleeding and/or hematoma  - Assess quality of pulses, skin color and temperature  - Assess for signs of decreased coronary artery perfusion - ex. Angina  - Evaluate fluid balance, assess for edema, trend weights  Outcome: Progressing  Goal: Absence of cardiac arrhythmias or at baseline  Description: INTERVENTIONS:  - Continuous cardiac monitoring, monitor vital signs, obtain 12 lead EKG if indicated  - Evaluate effectiveness of antiarrhythmic and heart rate control medications as ordered  - Initiate emergency measures for life threatening arrhythmias  - Monitor electrolytes and administer replacement therapy as ordered  Outcome: Progressing

## 2025-03-02 NOTE — PLAN OF CARE
Problem: Patient/Family Goals  Goal: Patient/Family Long Term Goal  Description: Patient's Long Term Goal: Discharge home    Interventions:  - Follow MD orders  - medications as ordered  - See additional Care Plan goals for specific interventions  Outcome: Progressing  Goal: Patient/Family Short Term Goal  Description: Patient's Short Term Goal: lower INR    Interventions:   - vitamin K  - US RUE  - See additional Care Plan goals for specific interventions  Outcome: Progressing     Problem: SKIN/TISSUE INTEGRITY - ADULT  Goal: Skin integrity remains intact  Description: INTERVENTIONS  - Assess and document risk factors for pressure ulcer development  - Assess and document skin integrity  - Monitor for areas of redness and/or skin breakdown  - Initiate interventions, skin care algorithm/standards of care as needed  Outcome: Progressing  Goal: Incision(s), wounds(s) or drain site(s) healing without S/S of infection  Description: INTERVENTIONS:  - Assess and document risk factors for pressure ulcer development  - Assess and document skin integrity  - Assess and document dressing/incision, wound bed, drain sites and surrounding tissue  - Implement wound care per orders  - Initiate isolation precautions as appropriate  - Initiate Pressure Ulcer prevention bundle as indicated  Outcome: Progressing  Goal: Oral mucous membranes remain intact  Description: INTERVENTIONS  - Assess oral mucosa and hygiene practices  - Implement preventative oral hygiene regimen  - Implement oral medicated treatments as ordered  Outcome: Progressing     Problem: CARDIOVASCULAR - ADULT  Goal: Maintains optimal cardiac output and hemodynamic stability  Description: INTERVENTIONS:  - Monitor vital signs, rhythm, and trends  - Monitor for bleeding, hypotension and signs of decreased cardiac output  - Evaluate effectiveness of vasoactive medications to optimize hemodynamic stability  - Monitor arterial and/or venous puncture sites for bleeding  and/or hematoma  - Assess quality of pulses, skin color and temperature  - Assess for signs of decreased coronary artery perfusion - ex. Angina  - Evaluate fluid balance, assess for edema, trend weights  Outcome: Progressing  Goal: Absence of cardiac arrhythmias or at baseline  Description: INTERVENTIONS:  - Continuous cardiac monitoring, monitor vital signs, obtain 12 lead EKG if indicated  - Evaluate effectiveness of antiarrhythmic and heart rate control medications as ordered  - Initiate emergency measures for life threatening arrhythmias  - Monitor electrolytes and administer replacement therapy as ordered  Outcome: Progressing

## 2025-03-02 NOTE — PHYSICAL THERAPY NOTE
Attempted to see pt for PT, however pt with ongoing elevated INR. PT to re attempt as able/appropriate.

## 2025-03-02 NOTE — OCCUPATIONAL THERAPY NOTE
Attempted to see pt for OT. Pt with ongoing elevated INR. Will re-attempt to see pt as appropriate and as able.

## 2025-03-03 LAB
INR BLD: 1.98 (ref 0.8–1.2)
PROTHROMBIN TIME: 22.7 SECONDS (ref 11.6–14.8)

## 2025-03-03 PROCEDURE — 99232 SBSQ HOSP IP/OBS MODERATE 35: CPT | Performed by: INTERNAL MEDICINE

## 2025-03-03 RX ORDER — WARFARIN SODIUM 2.5 MG/1
2.5 TABLET ORAL NIGHTLY
Status: DISCONTINUED | OUTPATIENT
Start: 2025-03-03 | End: 2025-03-04

## 2025-03-03 RX ORDER — WARFARIN SODIUM 2.5 MG/1
2.5 TABLET ORAL ONCE
Status: DISCONTINUED | OUTPATIENT
Start: 2025-03-03 | End: 2025-03-03

## 2025-03-03 RX ORDER — CLOPIDOGREL BISULFATE 75 MG/1
75 TABLET ORAL DAILY
Status: DISCONTINUED | OUTPATIENT
Start: 2025-03-03 | End: 2025-03-04

## 2025-03-03 RX ORDER — WARFARIN SODIUM 2.5 MG/1
2.5 TABLET ORAL NIGHTLY
Status: DISCONTINUED | OUTPATIENT
Start: 2025-03-03 | End: 2025-03-03

## 2025-03-03 RX ORDER — METOPROLOL SUCCINATE 50 MG/1
50 TABLET, EXTENDED RELEASE ORAL
Status: DISCONTINUED | OUTPATIENT
Start: 2025-03-03 | End: 2025-03-03

## 2025-03-03 RX ORDER — METOPROLOL SUCCINATE 50 MG/1
50 TABLET, EXTENDED RELEASE ORAL
Status: DISCONTINUED | OUTPATIENT
Start: 2025-03-03 | End: 2025-03-04

## 2025-03-03 RX ADMIN — HYDROCODONE BITARTRATE AND ACETAMINOPHEN 1 TABLET: 5; 325 TABLET ORAL at 10:29:00

## 2025-03-03 RX ADMIN — BUMETANIDE 1 MG: 1 TABLET ORAL at 17:21:00

## 2025-03-03 RX ADMIN — ALPRAZOLAM 0.25 MG: 0.25 MG TABLET ORAL at 10:51:00

## 2025-03-03 RX ADMIN — HYDROCODONE BITARTRATE AND ACETAMINOPHEN 2 TABLET: 5; 325 TABLET ORAL at 20:40:00

## 2025-03-03 RX ADMIN — AMIODARONE HYDROCHLORIDE 200 MG: 200 TABLET ORAL at 08:31:00

## 2025-03-03 RX ADMIN — HYDROCODONE BITARTRATE AND ACETAMINOPHEN 2 TABLET: 5; 325 TABLET ORAL at 06:11:00

## 2025-03-03 RX ADMIN — METOPROLOL SUCCINATE 50 MG: 50 TABLET, EXTENDED RELEASE ORAL at 11:53:00

## 2025-03-03 RX ADMIN — WARFARIN SODIUM 2.5 MG: 2.5 TABLET ORAL at 20:40:00

## 2025-03-03 RX ADMIN — CLOPIDOGREL BISULFATE 75 MG: 75 TABLET ORAL at 11:53:00

## 2025-03-03 RX ADMIN — BUSPIRONE HYDROCHLORIDE 10 MG: 5 TABLET ORAL at 08:31:00

## 2025-03-03 RX ADMIN — LEVOTHYROXINE SODIUM 75 MCG: 75 TABLET ORAL at 06:10:00

## 2025-03-03 RX ADMIN — BUSPIRONE HYDROCHLORIDE 10 MG: 5 TABLET ORAL at 20:40:00

## 2025-03-03 RX ADMIN — HYDROCODONE BITARTRATE AND ACETAMINOPHEN 1 TABLET: 5; 325 TABLET ORAL at 15:22:00

## 2025-03-03 RX ADMIN — BUMETANIDE 1 MG: 1 TABLET ORAL at 08:31:00

## 2025-03-03 RX ADMIN — ROSUVASTATIN CALCIUM 20 MG: 20 TABLET, COATED ORAL at 20:40:00

## 2025-03-03 NOTE — PROGRESS NOTES
[facility]   part of Located [facility]     Hospitalist Progress Note     Kera Reed Patient Status:  Observation    1945 MRN [id number]   Location [facility] 8NE-A Attending Deena Gomez MD   Hosp Day # 2 PCP Daya Chandler MD     Chief Complaint: R arm bruising    Subjective:     RUE swelling and bruising continue to improve. She prefers the way she feels with home meds being held    Objective:    Review of Systems:   A comprehensive review of systems was completed; pertinent positive and negatives stated in subjective.    Vital signs:  Temp:  [97.6 °F (36.4 °C)-99.1 °F (37.3 °C)] 98.2 °F (36.8 °C)  Pulse:  [] 105  Resp:  [18-20] 18  BP: (107-128)/(77-88) 128/84  SpO2:  [94 %-100 %] 94 %    Physical Exam:    General: No acute distress  Respiratory: No wheezes, no rhonchi  Cardiovascular: S1, S2, regular rate and rhythm  Abdomen: Soft, Non-tender, non-distended, positive bowel sounds  Neuro: No new focal deficits.   Extremities: trace BLLE edema. Extensive RUE ecchymosis and edema from shoulder to fingers     Diagnostic Data:    Labs:  Recent Labs   Lab 25  0943 25  0957 25  0535 25  0503 25  0941 25  0526   WBC 6.2  --  5.4  --   --   --    HGB 12.8  --  11.7*  --  11.5*  --    MCV 93.4  --  90.9  --   --   --    .0  --  184.0  --   --   --    INR 8.01* 8.0* 5.57* 4.10*  --  1.98*       Recent Labs   Lab 25  1410 25  0535   GLU  --  99   BUN  --  25*   CREATSERUM 0.95 0.86   CA  --  8.8   NA  --  140   K  --  4.6   CL  --  107   CO2  --  25.0       Estimated Glomerular Filtration Rate: 68 mL/min/1.73m2 (by CKD-EPI based on SCr of 0.86 mg/dL).    No results for input(s): \"TROP\", \"TROPHS\", \"CK\" in the last 168 hours.    Recent Labs   Lab 25  0535 25  0503 25  0526   PTP 50.9* 40.0* 22.7*   INR 5.57* 4.10* 1.98*        Microbiology    No results found for this visit on 25.      Imaging: Reviewed in  Epic.    Medications:    clopidogrel  75 mg Oral Daily    metoprolol succinate  50 mg Oral Daily Beta Blocker    warfarin  2.5 mg Oral Nightly    rosuvastatin  20 mg Oral Nightly    bumetanide  1 mg Oral BID (Diuretic)    busPIRone  10 mg Oral BID    levothyroxine  75 mcg Oral QAM AC    amiodarone  200 mg Oral Daily       Assessment & Plan:      #Supratherapeutic INR  #RUE ecchymosis  -RUE venous doppler negative for DVT; possible hematoma   -CTA RUE with hematoma, but no active extravasation noted  -INR improved with holding coumadin  -okay to resume plavix and warfarin   -follow CBC, transfuse Hgb < 7.0  -Hgb stable    #Acute blood loss anemia d/t above  -Hgb initially down, but now stable  -CTA RUE with hematoma, but no active extravasation noted  -follow CBC     #Persistent, symptomatic afib  -resume warfarin  - metoprolol  -amiodarone taper  -plan for future DCCV  -cardiology following     #HTN  -resume metoprolol  -discontinue losartan as BP's low normal      #HLD  -not on statin     #MV CAD with hx of CABG in 2012, PCI to LM/LAD 11/2024  -resume plavix     #Chronic HFrEF, not in exacerbation  #Severe TVR  -most recent echo with EF 35-40%, basal inferior hypokinesis   -bumex, resume metoprolol  -continue holding PTA losartan     #Hypercoagulable state  #Factor V Leiden/APLS  #Hx of DVT/PE  -resume warfarin, daily INR     #DM2 with hyperglycemia  -HgbA1c 6.8%  -hold on hyperglycemia protocol/accuchecks unless glucose > 180     #Anxiety/depression  -buspirone, PRN xanax     #Hypothyroidism  -levothyroxine      Jeanna Yanes DO    Supplementary Documentation:     Quality:  DVT Mechanical Prophylaxis:   SCDs,    DVT Pharmacologic Prophylaxis   Medication    warfarin (Coumadin) tab 2.5 mg                Code Status: Not on file  Franks: No urinary catheter in place  Franks Duration (in days):   Central line:    LACHELLE: 3/4/2025    Discharge is dependent on: clinical state  At this point Ms. Reed is expected to be  discharge to: home    The 21st Century Cures Act makes medical notes like these available to patients in the interest of transparency. Please be advised this is a medical document. Medical documents are intended to carry relevant information, facts as evident, and the clinical opinion of the practitioner. The medical note is intended as peer to peer communication and may appear blunt or direct. It is written in medical language and may contain abbreviations or verbiage that are unfamiliar.

## 2025-03-03 NOTE — PHYSICAL THERAPY NOTE
PHYSICAL THERAPY EVALUATION - INPATIENT     Room Number: 8621/8621-A  Evaluation Date: 3/3/2025  Type of Evaluation: Initial  Physician Order: PT Eval and Treat    Presenting Problem: elevated INR, complex hematoma in R biceps (Pt was supposed to have MER/Cardioversion on 2/28 but INR was too high)  Co-Morbidities : R GH jt effusion and subacromial/subdeltoid bursitis, CHF, CAD, ischemic cardiomyopathy, paroxysmal a-fib, PVD, HTN, hypothyroidism  Reason for Therapy: Mobility Dysfunction and Discharge Planning    PHYSICAL THERAPY ASSESSMENT   Patient is a 80 year old female admitted 2/28/2025 for elevated INR. Pt was scheduled to have a MER/cardioversion on 2/28 but did not proceed due to elevated INR. Patient is currently functioning at baseline with bed mobility, transfers, gait, stair negotiation, maintaining seated position, and standing prolonged periods. Prior to admission, patient's baseline is independent with occasional need of a rollator for long distances during ambulation.     Patient is functioning at her prior level and patient will be discharged with no other services needed.     PLAN  Patient has been evaluated and presents with no skilled Physical Therapy needs at this time.  Patient discharged from Physical Therapy services.  Please re-order if a new functional limitation presents during this admission.    PT Device Recommendation: Rolling walker;Rollator    GOALS  Patient was able to achieve the following goals ...    Patient was able to transfer At previous, functional level   Patient able to ambulate on level surfaces At previous, functional level     HOME SITUATION  Type of Home: House  Home Layout: Multi-level (basement)           Stairs to Bedroom:  (lives on main floor)         Lives With: Spouse    Drives: No   Patient Regularly Uses: Rollator     Prior Level of [place]: Pt performed all ADL's independently and would use a rollator for long distances and no AD for short distances.      SUBJECTIVE  Pt was pleasant and agreeable to a session.     OBJECTIVE  Precautions: Bed/chair alarm (R arm precautions)  Fall Risk: Standard fall risk    WEIGHT BEARING RESTRICTION     PAIN ASSESSMENT  Ratin          COGNITION  Overall Cognitive Status:  WFL - within functional limits    RANGE OF MOTION AND STRENGTH ASSESSMENT  Upper extremity ROM and strength are within functional limits     Lower extremity ROM is within functional limits     Lower extremity strength is within functional limits     BALANCE  Static Sitting: Good  Dynamic Sitting: Good  Static Standing: Fair -  Dynamic Standing: Fair -    ADDITIONAL TESTS   Pt did present with bruising and swelling along the entire R UE.                                  ACTIVITY TOLERANCE   Pt did not complain of dizziness or shortness of breath during activity.                      O2 WALK       NEUROLOGICAL FINDINGS                        AM-PAC '6-Clicks' INPATIENT SHORT FORM - BASIC MOBILITY  How much difficulty does the patient currently have...  Patient Difficulty: Turning over in bed (including adjusting bedclothes, sheets and blankets)?: None   Patient Difficulty: Sitting down on and standing up from a chair with arms (e.g., wheelchair, bedside commode, etc.): None   Patient Difficulty: Moving from lying on back to sitting on the side of the bed?: None   How much help from another person does the patient currently need...   Help from Another: Moving to and from a bed to a chair (including a wheelchair)?: None   Help from Another: Need to walk in hospital room?: None   Help from Another: Climbing 3-5 steps with a railing?: None       AM-PAC Score:  Raw Score: 24   Approx Degree of Impairment: 0%   Standardized Score (AM-PAC Scale): 61.14   CMS Modifier (G-Code): CH    FUNCTIONAL ABILITY STATUS  Gait Assessment   Functional Mobility/Gait Assessment  Gait Assistance: Modified independent  Distance (ft): 300  Assistive Device: Rolling walker  Pattern:  Within Functional Limits    Skilled Therapy Provided: Per RN okay to see patient. Pt was received in chair and was agreeable to session.     Bed Mobility: Pt denies concerns about bed mobility.   Rolling: NT  Supine to sit: NT   Sit to supine: NT     Transfer Mobility:  Sit to stand: independent   Stand to sit: independent  Gait = Pt ambulated mod independently with RW for approximately 300 feet.     Therapist's comments: Pt was educated on role of therapy, goals for the session, and energy conservation.     Exercise/Education Provided:  Body mechanics  Energy conservation  Gait training    Patient End of Session: Up in chair;Needs met;Call light within reach;Hospital anti-slip socks;Alarm set    Patient Evaluation Complexity Level:  History Moderate - 1 or 2 personal factors and/or co-morbidities   Examination of body systems Low -  addressing 1-2 elements   Clinical Presentation Low- Stable   Clinical Decision Making Low Complexity       PT Session Time: 23 minutes  Gait Training: 10 minutes

## 2025-03-03 NOTE — OCCUPATIONAL THERAPY NOTE
OCCUPATIONAL THERAPY EVALUATION - INPATIENT    Room Number: 8621/8621-A  Evaluation Date: 3/3/2025     Type of Evaluation: Initial  Presenting Problem: high INR    Physician Order: IP Consult to Occupational Therapy  Reason for Therapy:  ADL/IADL Dysfunction and Discharge Planning    OCCUPATIONAL THERAPY ASSESSMENT   Patient is a 80 year old female admitted on 2/28/2025 with Presenting Problem: high INR. Co-Morbidities : R GH jt effusion and subacromial/subdeltoid bursitis, CHF, CAD, ischemic cardiomyopathy, paroxysmal a-fib, PVD, HTN, hypothyroidism  Patient is currently functioning at baseline with toileting, upper body dressing, lower body dressing, grooming, bed mobility, transfers, static sitting balance, dynamic sitting balance, static standing balance, dynamic standing balance, and maintaining seated position.  Prior to admission, patient's baseline is Mod I.  Patient met all OT goals at Sup level.  Patient reports no further questions/concerns at this time.       Recommendations for nursing staff:   Transfers: Sup  Toileting location: Toilet    EVALUATION SESSION:  Patient at start of session: supine in bed for session    FUNCTIONAL TRANSFER ASSESSMENT  Sit to Stand: Edge of Bed  Edge of Bed: Supervision  Toilet Transfer: Supervision    BED MOBILITY  Rolling: Supervision  Supine to Sit : Supervision  Scooting: sup to EOB    BALANCE ASSESSMENT  Static Sitting: Supervision  Static Standing: Supervision    FUNCTIONAL ADL ASSESSMENT  Grooming Standing: Supervision (at sink)  LB Dressing Seated: Supervision (for shoes and socks)    ACTIVITY TOLERANCE: vitals stable                         O2 SATURATIONS       COGNITION  Overall Cognitive Status:  WFL - within functional limits    COGNITION ASSESSMENTS     Upper Extremity:   ROM: within functional limits   Strength: is within functional limits   Coordination:  Gross motor: WNL  Fine motor: WNL  Sensation: Light touch:  intact    EDUCATION PROVIDED  Patient  Education : Role of Occupational Therapy; Plan of Care  Patient's Response to Education: Verbalized Understanding; Returned Demonstration    Equipment used: RW  Demonstrates functional use    Therapist comments: Pt reported fatigue at home, pt educated on work simplification and energy conservation for at home to assist with independence with fatigue at home.     Patient End of Session: Up in chair, Needs met, Call light within reach, RN aware of session/findings, All patient questions and concerns addressed, Hospital anti-slip socks, Alarm set, Family present    OCCUPATIONAL PROFILE    HOME SITUATION  Type of Home: House  Home Layout: Multi-level (basement)  Lives With: Spouse    Toilet and Equipment: Standard height toilet  Shower/Tub and Equipment: Walk-in shower  Other Equipment: None    Occupation/Status: retired  Hand Dominance: Right  Drives: No  Patient Regularly Uses: Rollator    Prior Level of Function: Pt completes all ADLs and IADLs with Mod I at home.    SUBJECTIVE  Pt stated, \"I am doing good.\"    PAIN ASSESSMENT  Ratin  Location: no pain at this time       OBJECTIVE  Precautions: Bed/chair alarm (R arm precautions)  Fall Risk: Standard fall risk    WEIGHT BEARING RESTRICTION       AM-PAC ‘6-Clicks’ Inpatient Daily Activity Short Form  -   Putting on and taking off regular lower body clothing?: A Little  -   Bathing (including washing, rinsing, drying)?: A Little  -   Toileting, which includes using toilet, bedpan or urinal? : A Little  -   Putting on and taking off regular upper body clothing?: A Little  -   Taking care of personal grooming such as brushing teeth?: A Little  -   Eating meals?: A Little    AM-PAC Score:  Score: 18  Approx Degree of Impairment: 46.65%  Standardized Score (AM-PAC Scale): 38.66    ADDITIONAL TESTS     NEUROLOGICAL FINDINGS      PLAN   Patient has been evaluated and presents with no skilled Occupational Therapy needs at this time.  Patient discharged from Occupational  Therapy services.  Please re-order if a new functional limitation presents during this admission.         Patient Evaluation Complexity Level:   Occupational Profile/Medical History LOW - Brief history including review of medical or therapy records    Specific performance deficits impacting engagement in ADL/IADL LOW  1 - 3 performance deficits    Client Assessment/Performance Deficits LOW - No comorbidities nor modifications of tasks    Clinical Decision Making LOW - Analysis of occupational profile, problem-focused assessments, limited treatment options    Overall Complexity LOW     OT Session Time: 20 minutes  Self-Care Home Management: 10 minutes  Therapeutic Activity: 0 minutes  Neuromuscular Re-education: 0 minutes  Therapeutic Exercise: 0 minutes  Cognitive Skills: 0 minutes  Sensory Integrative: 0 minutes  Orthotic Management and Trainin minutes  Can add/delete any of these

## 2025-03-03 NOTE — PLAN OF CARE
Assumed patient care around 0730 this AM. Patient alert and oriented x4. Spo2 maintained on RA. Afib on tele, HR maintained between 90's-100's. Patient continent of bowel and bladder. Denies pain at this time. RUE bruising noted. Patient is up with standby assist and a walker in the room. Updated on POC. Needs met.         Problem: Patient/Family Goals  Goal: Patient/Family Long Term Goal  Description: Patient's Long Term Goal: Discharge home  3/3: To stay out of the hospital when discharged     Interventions:  - Follow MD orders  - medications as ordered  3/3:  -Attend follow up appointments as needed  -Follow medication regimen at home  - See additional Care Plan goals for specific interventions  Outcome: Progressing  Goal: Patient/Family Short Term Goal  Description: Patient's Short Term Goal: lower INR  3/3: To go home    Interventions:   - vitamin K  - US RUE  3/3:  -Tele monitoring  -Monitor labs  -Monitor INR  -Coumadin on hold   - See additional Care Plan goals for specific interventions  Outcome: Progressing     Problem: SKIN/TISSUE INTEGRITY - ADULT  Goal: Skin integrity remains intact  Description: INTERVENTIONS  - Assess and document risk factors for pressure ulcer development  - Assess and document skin integrity  - Monitor for areas of redness and/or skin breakdown  - Initiate interventions, skin care algorithm/standards of care as needed  Outcome: Progressing  Goal: Incision(s), wounds(s) or drain site(s) healing without S/S of infection  Description: INTERVENTIONS:  - Assess and document risk factors for pressure ulcer development  - Assess and document skin integrity  - Assess and document dressing/incision, wound bed, drain sites and surrounding tissue  - Implement wound care per orders  - Initiate isolation precautions as appropriate  - Initiate Pressure Ulcer prevention bundle as indicated  Outcome: Progressing  Goal: Oral mucous membranes remain intact  Description: INTERVENTIONS  - Assess oral  mucosa and hygiene practices  - Implement preventative oral hygiene regimen  - Implement oral medicated treatments as ordered  Outcome: Progressing     Problem: CARDIOVASCULAR - ADULT  Goal: Maintains optimal cardiac output and hemodynamic stability  Description: INTERVENTIONS:  - Monitor vital signs, rhythm, and trends  - Monitor for bleeding, hypotension and signs of decreased cardiac output  - Evaluate effectiveness of vasoactive medications to optimize hemodynamic stability  - Monitor arterial and/or venous puncture sites for bleeding and/or hematoma  - Assess quality of pulses, skin color and temperature  - Assess for signs of decreased coronary artery perfusion - ex. Angina  - Evaluate fluid balance, assess for edema, trend weights  Outcome: Progressing  Goal: Absence of cardiac arrhythmias or at baseline  Description: INTERVENTIONS:  - Continuous cardiac monitoring, monitor vital signs, obtain 12 lead EKG if indicated  - Evaluate effectiveness of antiarrhythmic and heart rate control medications as ordered  - Initiate emergency measures for life threatening arrhythmias  - Monitor electrolytes and administer replacement therapy as ordered  Outcome: Progressing

## 2025-03-03 NOTE — PLAN OF CARE
Patient Alert and oriented, on RA, lung are clear, denies any SOB and Pain, A-FIB with BBB on tele, SBA, Continent of bowl/ bladder, Right arm bruise and swelling slightly improving. Plan of care updated all question answered.        Problem: Patient/Family Goals  Goal: Patient/Family Long Term Goal  Description: Patient's Long Term Goal: Discharge home    Interventions:  - Follow MD orders  - medications as ordered  - See additional Care Plan goals for specific interventions  Outcome: Progressing     Problem: Patient/Family Goals  Goal: Patient/Family Short Term Goal  Description: Patient's Short Term Goal: lower INR    Interventions:   - vitamin K  - US RUE  - See additional Care Plan goals for specific interventions  Outcome: Progressing     Problem: SKIN/TISSUE INTEGRITY - ADULT  Goal: Skin integrity remains intact  Description: INTERVENTIONS  - Assess and document risk factors for pressure ulcer development  - Assess and document skin integrity  - Monitor for areas of redness and/or skin breakdown  - Initiate interventions, skin care algorithm/standards of care as needed  Outcome: Progressing     Problem: SKIN/TISSUE INTEGRITY - ADULT  Goal: Incision(s), wounds(s) or drain site(s) healing without S/S of infection  Description: INTERVENTIONS:  - Assess and document risk factors for pressure ulcer development  - Assess and document skin integrity  - Assess and document dressing/incision, wound bed, drain sites and surrounding tissue  - Implement wound care per orders  - Initiate isolation precautions as appropriate  - Initiate Pressure Ulcer prevention bundle as indicated  Outcome: Progressing     Problem: CARDIOVASCULAR - ADULT  Goal: Maintains optimal cardiac output and hemodynamic stability  Description: INTERVENTIONS:  - Monitor vital signs, rhythm, and trends  - Monitor for bleeding, hypotension and signs of decreased cardiac output  - Evaluate effectiveness of vasoactive medications to optimize hemodynamic  stability  - Monitor arterial and/or venous puncture sites for bleeding and/or hematoma  - Assess quality of pulses, skin color and temperature  - Assess for signs of decreased coronary artery perfusion - ex. Angina  - Evaluate fluid balance, assess for edema, trend weights  Outcome: Progressing     Problem: CARDIOVASCULAR - ADULT  Goal: Absence of cardiac arrhythmias or at baseline  Description: INTERVENTIONS:  - Continuous cardiac monitoring, monitor vital signs, obtain 12 lead EKG if indicated  - Evaluate effectiveness of antiarrhythmic and heart rate control medications as ordered  - Initiate emergency measures for life threatening arrhythmias  - Monitor electrolytes and administer replacement therapy as ordered  Outcome: Progressing

## 2025-03-03 NOTE — PROGRESS NOTES
Progress Note  Kera Reed Patient Status:  Observation    1945 MRN [id number]   Location [facility] 8NE-A Attending Deena Gomez MD   Hosp Day # 2 PCP Daya Chandler MD     Subjective:  Feels good. No chest pain, dyspnea, or palpitations.  Ambulating with PT w/ ventricular rates in the 120-130's. Controlled at rest.    Objective:  Physical Exam:   /87 (BP Location: Left arm)   Pulse 97   Temp 99.1 °F (37.3 °C) (Oral)   Resp 18   Ht 5' 2\" (1.575 m)   Wt 162 lb 11.2 oz (73.8 kg)   LMP  (LMP Unknown)   SpO2 96%   BMI 29.76 kg/m²   Temp (24hrs), Av °F (36.7 °C), Min:97.6 °F (36.4 °C), Max:99.1 °F (37.3 °C)       Intake/Output Summary (Last 24 hours) at 3/3/2025 0838  Last data filed at 3/3/2025 0600  Gross per 24 hour   Intake 1080 ml   Output 1250 ml   Net -170 ml     Wt Readings from Last 3 Encounters:   25 162 lb 11.2 oz (73.8 kg)   01/10/25 151 lb (68.5 kg)   24 157 lb (71.2 kg)     Telemetry: afib in the 80's at rest  General: Alert and oriented in no apparent distress comfortable bedside chair on RA.  HEENT: No focal deficits.  Neck: No JVD, carotids 2+ no bruits.  Cardiac: Irregularly irregular, S1, S2 normal, rub or gallop.  Lungs: Clear without wheezes, rales, rhonchi or dullness.  Normal excursions and effort.  Abdomen: Soft, non-tender.   Extremities: Without clubbing, cyanosis or edema.  Peripheral pulses are 2+.  Neurologic: Alert and oriented, normal affect.  Skin: Warm and dry. Improving ecchymosis and edema of RUE from axilla to mid arm. Pulses intact. Sensation and motricity intact      Intake/Output:    Intake/Output Summary (Last 24 hours) at 3/3/2025 0838  Last data filed at 3/3/2025 0600  Gross per 24 hour   Intake 1080 ml   Output 1250 ml   Net -170 ml       Last 3 Weights   25 0500 162 lb 11.2 oz (73.8 kg)   25 1227 158 lb 4.6 oz (71.8 kg)   25 1025 158 lb 4.6 oz (71.8 kg)   25 1000 150 lb (68 kg)   01/10/25 1419 151 lb  (68.5 kg)   12/03/24 1430 157 lb (71.2 kg)       Labs:  Recent Labs   Lab 02/28/25  1410 03/01/25  0535   GLU  --  99   BUN  --  25*   CREATSERUM 0.95 0.86   EGFRCR 60 68   CA  --  8.8   NA  --  140   K  --  4.6   CL  --  107   CO2  --  25.0     Recent Labs   Lab 02/28/25  0943 03/01/25  0535 03/02/25  0941   RBC 4.08 3.83  --    HGB 12.8 11.7* 11.5*   HCT 38.1 34.8*  --    MCV 93.4 90.9  --    MCH 31.4 30.5  --    MCHC 33.6 33.6  --    RDW 18.3 18.3  --    NEPRELIM  --  3.54  --    WBC 6.2 5.4  --    .0 184.0  --          No results for input(s): \"TROP\", \"TROPHS\", \"CK\" in the last 168 hours.    Diagnostics:   CTA UE 3/1: FINDINGS:    Right upper extremity CT angiography demonstrates a hematoma in the biceps measuring up to 6.2 x 3.9 cm (image 429) by 10.9 centimeters in length.  No evidence of active extravasation is noted.  Multiple fluid levels are noted compatible with   organization of the hematoma.  Hyperdense foci within the mid [place] likely represents subacute hemorrhage.  Subcutaneous edema in the right arm is noted.      No significant stenosis on CT angiography of the right upper extremity is noted.      No acute fracture or dislocation.  Degenerative changes in the right glenohumeral joint are noted with complex appearing bursal fluid around the right joint.  Right joint effusion is noted.     ECHOCARDIOGRAM 2/4/2025:  1. Left ventricle: The cavity size was normal. Wall thickness was normal.      Systolic function was at the lower limits of normal. The estimated      ejection fraction was 50-55%. Unable to assess LV diastolic function due  to heart rhythm.   2. Right ventricle: The cavity size was moderately to markedly increased.      Systolic function was reduced.   3. Left atrium: The left atrial volume was moderately increased.   4. Right atrium: The atrium was massively dilated.   5. Ascending aorta: The ascending aorta was 3.7cm diameter.   6. Mitral valve: There was mild regurgitation.   7.  Tricuspid valve: There was malcoaptation of the valve leaflets. There was      severe regurgitation.   Impressions:  No previous study from [facility] was   available for comparison.     US Venous 2/28:  1. Negative for deep venous thrombosis of the right upper extremity.   2. Moderate subcutaneous edema of the proximal right upper extremity, additionally with a hypoechoic, nonvascular process within the subcutaneous tissues of the proximal/mid upper extremity (12.0 x 3.5 x 3.4 cm), possibly an evolving hematoma.   3. Incidental findings of right glenohumeral joint effusion and subacromial/subdeltoid bursitis.         Medications:   rosuvastatin  20 mg Oral Nightly    bumetanide  1 mg Oral BID (Diuretic)    [Held by provider] phytonadione  2.5 mg Oral Once    busPIRone  10 mg Oral BID    levothyroxine  75 mcg Oral QAM AC    amiodarone  200 mg Oral Daily           Assessment/Plan:  Supratherapeutic INR  Trending downwards to 1.98 today from 5 and 8 prior  RUE hematoma without apparent active extravasation on CTA RUE 3/1  Continued improvement on exam  Hgb stable  Holding Plavix and Warfarin  Persistent afib with controlled ventricular rates  Plan for DCCV eventually  Continue amiodarone taper  Warfarin held, metoprolol  LVEF 50-55% 2/5/2025  Multivessel CAD s/p CABG in 2012 and PCI to LM/LAD in 11/2024  Plavix on hold - no chest pain, including with ambulation  LV preserved February 2025  Severe TR  Compensated  MER report from 12/6/2024 reviewed  HTN  BP at goal  HLD  Consider statin  Hx of Tobacco abuse w/ 40 year pack hx  Hx of DVT/PE, Factor V Leiden/APLS  Warfarin held as above  DM II  Hypothyroidism    PLAN  INR normalized and Hgb stable  Hold warfarin and Plavix for now - consider resuming today at lower warfarin dose while on amiodarone  IF OAC tolerated potentially discharge tomorrow?  Continue statin  Continue amiodarone while therapeutic then revisit rhythm management  outpatient      Cardiology attending:  Pt seen and independently examined.  Note edited.  Plan of care performed by myself in its entirety.  Fortunately Hb stable despite her arm's appearance.  Had complex left main PCI in November, will resume plavix.  Also will resume warfarin as INR drifting down.  Follow her overnight, may be ok for dc tomorrow.  Resume warfarin at 2.5 mg at bedtime.  Losartan and metoprolol have been held.  BP's soft, but HR creeping up.  Stop losartan on dc.  Will change to Toprol 50 mg daily (was on lopressor 100 mg BID PTA).    Gregg Figueroa MD  L3    Kolton Arita PA-C  3/3/2025  08:38 AM

## 2025-03-04 VITALS
WEIGHT: 159.63 LBS | DIASTOLIC BLOOD PRESSURE: 68 MMHG | OXYGEN SATURATION: 85 % | RESPIRATION RATE: 18 BRPM | HEIGHT: 62 IN | SYSTOLIC BLOOD PRESSURE: 98 MMHG | BODY MASS INDEX: 29.38 KG/M2 | HEART RATE: 90 BPM | TEMPERATURE: 98 F

## 2025-03-04 LAB
BASOPHILS # BLD AUTO: 0.05 X10(3) UL (ref 0–0.2)
BASOPHILS NFR BLD AUTO: 0.8 %
EOSINOPHIL # BLD AUTO: 0.04 X10(3) UL (ref 0–0.7)
EOSINOPHIL NFR BLD AUTO: 0.7 %
ERYTHROCYTE [DISTWIDTH] IN BLOOD BY AUTOMATED COUNT: 18.6 %
HCT VFR BLD AUTO: 34.3 %
HGB BLD-MCNC: 11.8 G/DL
IMM GRANULOCYTES # BLD AUTO: 0.2 X10(3) UL (ref 0–1)
IMM GRANULOCYTES NFR BLD: 3.4 %
INR BLD: 1.4 (ref 0.8–1.2)
LYMPHOCYTES # BLD AUTO: 1.15 X10(3) UL (ref 1–4)
LYMPHOCYTES NFR BLD AUTO: 19.5 %
MCH RBC QN AUTO: 31.8 PG (ref 26–34)
MCHC RBC AUTO-ENTMCNC: 34.4 G/DL (ref 31–37)
MCV RBC AUTO: 92.5 FL
MONOCYTES # BLD AUTO: 0.48 X10(3) UL (ref 0.1–1)
MONOCYTES NFR BLD AUTO: 8.1 %
NEUTROPHILS # BLD AUTO: 3.97 X10 (3) UL (ref 1.5–7.7)
NEUTROPHILS # BLD AUTO: 3.97 X10(3) UL (ref 1.5–7.7)
NEUTROPHILS NFR BLD AUTO: 67.5 %
PLATELET # BLD AUTO: 216 10(3)UL (ref 150–450)
PROTHROMBIN TIME: 17.3 SECONDS (ref 11.6–14.8)
RBC # BLD AUTO: 3.71 X10(6)UL
WBC # BLD AUTO: 5.9 X10(3) UL (ref 4–11)

## 2025-03-04 PROCEDURE — 99239 HOSP IP/OBS DSCHRG MGMT >30: CPT | Performed by: INTERNAL MEDICINE

## 2025-03-04 RX ORDER — HYDROCODONE BITARTRATE AND ACETAMINOPHEN 5; 325 MG/1; MG/1
1 TABLET ORAL EVERY 8 HOURS PRN
Qty: 10 TABLET | Refills: 0 | Status: SHIPPED | OUTPATIENT
Start: 2025-03-04

## 2025-03-04 RX ORDER — WARFARIN SODIUM 2.5 MG/1
2.5 TABLET ORAL NIGHTLY
Qty: 30 TABLET | Refills: 0 | Status: SHIPPED | OUTPATIENT
Start: 2025-03-04

## 2025-03-04 RX ORDER — AMIODARONE HYDROCHLORIDE 200 MG/1
200 TABLET ORAL DAILY
Qty: 30 TABLET | Refills: 0 | Status: SHIPPED | OUTPATIENT
Start: 2025-03-05

## 2025-03-04 RX ORDER — METOPROLOL SUCCINATE 50 MG/1
50 TABLET, EXTENDED RELEASE ORAL
Qty: 30 TABLET | Refills: 0 | Status: SHIPPED | OUTPATIENT
Start: 2025-03-05

## 2025-03-04 RX ADMIN — LEVOTHYROXINE SODIUM 75 MCG: 75 TABLET ORAL at 05:38:00

## 2025-03-04 RX ADMIN — METOPROLOL SUCCINATE 50 MG: 50 TABLET, EXTENDED RELEASE ORAL at 05:38:00

## 2025-03-04 RX ADMIN — AMIODARONE HYDROCHLORIDE 200 MG: 200 TABLET ORAL at 09:49:00

## 2025-03-04 RX ADMIN — HYDROCODONE BITARTRATE AND ACETAMINOPHEN 2 TABLET: 5; 325 TABLET ORAL at 12:38:00

## 2025-03-04 RX ADMIN — BUSPIRONE HYDROCHLORIDE 10 MG: 5 TABLET ORAL at 09:49:00

## 2025-03-04 RX ADMIN — HYDROCODONE BITARTRATE AND ACETAMINOPHEN 2 TABLET: 5; 325 TABLET ORAL at 05:37:00

## 2025-03-04 RX ADMIN — CLOPIDOGREL BISULFATE 75 MG: 75 TABLET ORAL at 09:49:00

## 2025-03-04 RX ADMIN — BUMETANIDE 1 MG: 1 TABLET ORAL at 09:49:00

## 2025-03-04 NOTE — PLAN OF CARE
Problem: Patient/Family Goals  Goal: Patient/Family Long Term Goal  Description: Patient's Long Term Goal: Discharge home  3/3: To stay out of the hospital when discharged     Interventions:  - Follow MD orders  - medications as ordered  3/3:  -Attend follow up appointments as needed  -Follow medication regimen at home  - See additional Care Plan goals for specific interventions  Outcome: Adequate for Discharge  Goal: Patient/Family Short Term Goal  Description: Patient's Short Term Goal: lower INR  3/3: To go home    Interventions:   - vitamin K  - US RUE  3/3:  -Tele monitoring  -Monitor labs  -Monitor INR  -Coumadin on hold   - See additional Care Plan goals for specific interventions  Outcome: Adequate for Discharge     Problem: SKIN/TISSUE INTEGRITY - ADULT  Goal: Skin integrity remains intact  Description: INTERVENTIONS  - Assess and document risk factors for pressure ulcer development  - Assess and document skin integrity  - Monitor for areas of redness and/or skin breakdown  - Initiate interventions, skin care algorithm/standards of care as needed  Outcome: Adequate for Discharge  Goal: Incision(s), wounds(s) or drain site(s) healing without S/S of infection  Description: INTERVENTIONS:  - Assess and document risk factors for pressure ulcer development  - Assess and document skin integrity  - Assess and document dressing/incision, wound bed, drain sites and surrounding tissue  - Implement wound care per orders  - Initiate isolation precautions as appropriate  - Initiate Pressure Ulcer prevention bundle as indicated  Outcome: Adequate for Discharge  Goal: Oral mucous membranes remain intact  Description: INTERVENTIONS  - Assess oral mucosa and hygiene practices  - Implement preventative oral hygiene regimen  - Implement oral medicated treatments as ordered  Outcome: Adequate for Discharge     Problem: CARDIOVASCULAR - ADULT  Goal: Maintains optimal cardiac output and hemodynamic stability  Description:  INTERVENTIONS:  - Monitor vital signs, rhythm, and trends  - Monitor for bleeding, hypotension and signs of decreased cardiac output  - Evaluate effectiveness of vasoactive medications to optimize hemodynamic stability  - Monitor arterial and/or venous puncture sites for bleeding and/or hematoma  - Assess quality of pulses, skin color and temperature  - Assess for signs of decreased coronary artery perfusion - ex. Angina  - Evaluate fluid balance, assess for edema, trend weights  Outcome: Adequate for Discharge  Goal: Absence of cardiac arrhythmias or at baseline  Description: INTERVENTIONS:  - Continuous cardiac monitoring, monitor vital signs, obtain 12 lead EKG if indicated  - Evaluate effectiveness of antiarrhythmic and heart rate control medications as ordered  - Initiate emergency measures for life threatening arrhythmias  - Monitor electrolytes and administer replacement therapy as ordered  Outcome: Adequate for Discharge

## 2025-03-04 NOTE — PROGRESS NOTES
Progress Note  Kera Reed Patient Status:  Observation    1945 MRN [id number]   Location [facility] 8NE-A Attending Deena Gomez MD   Hosp Day # 3 PCP Daya Chandler MD     Subjective:  Feels good. No chest pain, dyspnea, or palpitations.  Ventricular rates remain well controlled in the 80's    Objective:  Physical Exam:   BP 97/65 (BP Location: Left arm)   Pulse 97   Temp 99 °F (37.2 °C) (Oral)   Resp 20   Ht 5' 2\" (1.575 m)   Wt 159 lb 9.8 oz (72.4 kg)   LMP  (LMP Unknown)   SpO2 95%   BMI 29.19 kg/m²   Temp (24hrs), Av.2 °F (36.8 °C), Min:97.8 °F (36.6 °C), Max:99 °F (37.2 °C)       Intake/Output Summary (Last 24 hours) at 3/4/2025 1022  Last data filed at 3/4/2025 0853  Gross per 24 hour   Intake 732 ml   Output 1950 ml   Net -1218 ml     Wt Readings from Last 3 Encounters:   25 159 lb 9.8 oz (72.4 kg)   01/10/25 151 lb (68.5 kg)   24 157 lb (71.2 kg)     Telemetry: afib in the 's at rest  General: Alert and oriented in no apparent distress comfortable bedside chair on RA.  HEENT: No focal deficits.  Neck: No JVD, carotids 2+ no bruits.  Cardiac: Irregularly irregular, S1, S2 normal, rub or gallop.  Lungs: Clear without wheezes, rales, rhonchi or dullness.  Normal excursions and effort.  Abdomen: Soft, non-tender.   Extremities: Without clubbing, cyanosis or edema.  Peripheral pulses are 2+.  Neurologic: Alert and oriented, normal affect.  Skin: Warm and dry. Improving ecchymosis and edema of RUE from axilla to mid arm. Pulses intact. Sensation and motricity intact      Intake/Output:    Intake/Output Summary (Last 24 hours) at 3/4/2025 1022  Last data filed at 3/4/2025 0853  Gross per 24 hour   Intake 732 ml   Output 1950 ml   Net -1218 ml       Last 3 Weights   25 0508 159 lb 9.8 oz (72.4 kg)   25 0500 162 lb 11.2 oz (73.8 kg)   25 1227 158 lb 4.6 oz (71.8 kg)   25 1025 158 lb 4.6 oz (71.8 kg)   25 1000 150 lb (68 kg)   01/10/25 1419  151 lb (68.5 kg)   12/03/24 1430 157 lb (71.2 kg)       Labs:  Recent Labs   Lab 02/28/25  1410 03/01/25  0535   GLU  --  99   BUN  --  25*   CREATSERUM 0.95 0.86   EGFRCR 60 68   CA  --  8.8   NA  --  140   K  --  4.6   CL  --  107   CO2  --  25.0     Recent Labs   Lab 02/28/25  0943 03/01/25  0535 03/02/25  0941   RBC 4.08 3.83  --    HGB 12.8 11.7* 11.5*   HCT 38.1 34.8*  --    MCV 93.4 90.9  --    MCH 31.4 30.5  --    MCHC 33.6 33.6  --    RDW 18.3 18.3  --    NEPRELIM  --  3.54  --    WBC 6.2 5.4  --    .0 184.0  --          No results for input(s): \"TROP\", \"TROPHS\", \"CK\" in the last 168 hours.    Diagnostics:   CTA UE 3/1: FINDINGS:    Right upper extremity CT angiography demonstrates a hematoma in the biceps measuring up to 6.2 x 3.9 cm (image 429) by 10.9 centimeters in length.  No evidence of active extravasation is noted.  Multiple fluid levels are noted compatible with   organization of the hematoma.  Hyperdense foci within the mid Muriel likely represents subacute hemorrhage.  Subcutaneous edema in the right arm is noted.      No significant stenosis on CT angiography of the right upper extremity is noted.      No acute fracture or dislocation.  Degenerative changes in the right glenohumeral joint are noted with complex appearing bursal fluid around the right joint.  Right joint effusion is noted.     ECHOCARDIOGRAM 2/4/2025:  1. Left ventricle: The cavity size was normal. Wall thickness was normal.      Systolic function was at the lower limits of normal. The estimated      ejection fraction was 50-55%. Unable to assess LV diastolic function due  to heart rhythm.   2. Right ventricle: The cavity size was moderately to markedly increased.      Systolic function was reduced.   3. Left atrium: The left atrial volume was moderately increased.   4. Right atrium: The atrium was massively dilated.   5. Ascending aorta: The ascending aorta was 3.7cm diameter.   6. Mitral valve: There was mild regurgitation.    7. Tricuspid valve: There was malcoaptation of the valve leaflets. There was      severe regurgitation.   Impressions:  No previous study from [facility] was   available for comparison.     US Venous 2/28:  1. Negative for deep venous thrombosis of the right upper extremity.   2. Moderate subcutaneous edema of the proximal right upper extremity, additionally with a hypoechoic, nonvascular process within the subcutaneous tissues of the proximal/mid upper extremity (12.0 x 3.5 x 3.4 cm), possibly an evolving hematoma.   3. Incidental findings of right glenohumeral joint effusion and subacromial/subdeltoid bursitis.         Medications:   clopidogrel  75 mg Oral Daily    metoprolol succinate  50 mg Oral Daily Beta Blocker    warfarin  2.5 mg Oral Nightly    rosuvastatin  20 mg Oral Nightly    bumetanide  1 mg Oral BID (Diuretic)    busPIRone  10 mg Oral BID    levothyroxine  75 mcg Oral QAM AC    amiodarone  200 mg Oral Daily           Assessment/Plan:  Supratherapeutic INR  Normalized - warfarin resumed last night  RUE hematoma without apparent active extravasation on CTA RUE 3/1  Continued improvement on exam  Hgb today  Holding Plavix and Warfarin  Persistent afib with controlled ventricular rates  Plan for DCCV eventually outpatient  Continue amiodarone taper  Warfarin resumed, metoprolol  LVEF 50-55% 2/5/2025  Multivessel CAD s/p CABG in 2012 and PCI to LM/LAD in 11/2024  Plavix resumed - no chest pain, including with ambulation  LV preserved February 2025  Severe TR  Compensated  MER report from 12/6/2024 reviewed  HTN  BP at goal  HLD  Consider statin  Hx of Tobacco abuse w/ 40 year pack hx  Hx of DVT/PE, Factor V Leiden/APLS  Warfarin held as above  DM II  Hypothyroidism    PLAN  INR normalized. As long as hgb stable reasonable to discharge from a cardiology standpoint early afternoon.  Resume warfarin and Plavix. ED precautions reviewed in detail.  She follows with her primary care provider  for her warfarin management. she has a pcp Appt tomorrow scheduled- I will order an INR Thursday  Continue statin  Continue amiodarone taper, BB, and PO Bumex      Cardiology attending:  Pt seen and independently examined.  Note edited.  Plan of care performed by myself in its entirety.  Plavix and warfarin resumed.  Dc ok with us.    Gregg Figueroa MD  L3        Kolton Arita PA-C  3/4/2025  10:24 AM

## 2025-03-04 NOTE — PLAN OF CARE
NURSING DISCHARGE NOTE    Discharged Home via Wheelchair.  Accompanied by Support staff  Belongings Taken by patient/family    Reviewed discharge instructions with patient and her . .They verbalized an understanding of the plan for follow up with primary care M. D. And Dr. Gomez in a week.

## 2025-03-04 NOTE — PROGRESS NOTES
[facility]   part of [facility]     Hospitalist Progress Note     Kera Reed Patient Status:  Observation    1945 MRN [id number]   Location [facility] 8NE-A Attending Deena Gomez MD   Hosp Day # 3 PCP Daya Chandler MD     Chief Complaint: R arm bruising    Subjective:     ***    Objective:    Review of Systems:   A comprehensive review of systems was completed; pertinent positive and negatives stated in subjective.    Vital signs:  Temp:  [97.8 °F (36.6 °C)-99 °F (37.2 °C)] 99 °F (37.2 °C)  Pulse:  [] 97  Resp:  [14-20] 20  BP: ()/(65-93) 97/65  SpO2:  [92 %-96 %] 95 %    Physical Exam:    General: No acute distress  Respiratory: No wheezes, no rhonchi  Cardiovascular: S1, S2, regular rate and rhythm  Abdomen: Soft, Non-tender, non-distended, positive bowel sounds  Neuro: No new focal deficits.   Extremities: trace BLLE edema. Extensive RUE ecchymosis and edema from shoulder to fingers     Diagnostic Data:    Labs:  Recent Labs   Lab 25  0943 25  0957 25  0535 25  0503 25  0941 25  0526 25  0513   WBC 6.2  --  5.4  --   --   --   --    HGB 12.8  --  11.7*  --  11.5*  --   --    MCV 93.4  --  90.9  --   --   --   --    .0  --  184.0  --   --   --   --    INR 8.01* 8.0* 5.57* 4.10*  --  1.98* 1.40*       Recent Labs   Lab 25  1410 25  0535   GLU  --  99   BUN  --  25*   CREATSERUM 0.95 0.86   CA  --  8.8   NA  --  140   K  --  4.6   CL  --  107   CO2  --  25.0       Estimated Glomerular Filtration Rate: 68 mL/min/1.73m2 (result from lab).    No results for input(s): \"TROP\", \"TROPHS\", \"CK\" in the last 168 hours.    Recent Labs   Lab 25  0503 25  0526 25  0513   PTP 40.0* 22.7* 17.3*   INR 4.10* 1.98* 1.40*        Microbiology    No results found for this visit on 25.      Imaging: Reviewed in Epic.    Medications:    clopidogrel  75 mg Oral Daily    metoprolol succinate  50 mg Oral Daily Beta  Blocker    warfarin  2.5 mg Oral Nightly    rosuvastatin  20 mg Oral Nightly    bumetanide  1 mg Oral BID (Diuretic)    busPIRone  10 mg Oral BID    levothyroxine  75 mcg Oral QAM AC    amiodarone  200 mg Oral Daily       Assessment & Plan:      #Supratherapeutic INR  #RUE ecchymosis  -RUE venous doppler negative for DVT; possible hematoma   -CTA RUE with hematoma, but no active extravasation noted  -INR improved with holding coumadin  -okay to resume plavix and warfarin   -follow CBC, transfuse Hgb < 7.0  -Hgb stable    #Acute blood loss anemia d/t above  -Hgb initially down, but now stable  -CTA RUE with hematoma, but no active extravasation noted  -follow CBC     #Persistent, symptomatic afib  -resume warfarin  - metoprolol  -amiodarone taper  -plan for future DCCV  -cardiology following     #HTN  -resume metoprolol  -discontinue losartan as BP's low normal      #HLD  -not on statin     #MV CAD with hx of CABG in 2012, PCI to LM/LAD 11/2024  -resume plavix     #Chronic HFrEF, not in exacerbation  #Severe TVR  -most recent echo with EF 35-40%, basal inferior hypokinesis   -bumex, resume metoprolol  -continue holding PTA losartan     #Hypercoagulable state  #Factor V Leiden/APLS  #Hx of DVT/PE  -resume warfarin, daily INR     #DM2 with hyperglycemia  -HgbA1c 6.8%  -hold on hyperglycemia protocol/accuchecks unless glucose > 180     #Anxiety/depression  -buspirone, PRN xanax     #Hypothyroidism  -levothyroxine      Jeanna Yanes,     Supplementary Documentation:     Quality:  DVT Mechanical Prophylaxis:   SCDs,    DVT Pharmacologic Prophylaxis   Medication    warfarin (Coumadin) tab 2.5 mg                Code Status: Not on file  Franks: No urinary catheter in place  Franks Duration (in days):   Central line:    LACHELLE: 3/4/2025    Discharge is dependent on: clinical state  At this point Ms. Reed is expected to be discharge to: home    The 21st Century Cures Act makes medical notes like these available to patients  in the interest of transparency. Please be advised this is a medical document. Medical documents are intended to carry relevant information, facts as evident, and the clinical opinion of the practitioner. The medical note is intended as peer to peer communication and may appear blunt or direct. It is written in medical language and may contain abbreviations or verbiage that are unfamiliar.

## 2025-03-04 NOTE — PLAN OF CARE
Assumed patient at 1930, Alert and oriented x4, SBA with walker, Continent of bowel/ bladder. On room air tolerate well, no SOB. No complains of chest pain. Still A-Fib with BBB on tele, Right arm swollen and bruise is improving. Plan of care update and all question answered.    Problem: Patient/Family Goals  Goal: Patient/Family Long Term Goal  Description: Patient's Long Term Goal: Discharge home  3/3: To stay out of the hospital when discharged     Interventions:  - Follow MD orders  - medications as ordered  3/3:  -Attend follow up appointments as needed  -Follow medication regimen at home  - See additional Care Plan goals for specific interventions  Outcome: Progressing     Problem: Patient/Family Goals  Goal: Patient/Family Short Term Goal  Description: Patient's Short Term Goal: lower INR  3/3: To go home    Interventions:   - vitamin K  - US RUE  3/3:  -Tele monitoring  -Monitor labs  -Monitor INR  -Coumadin on hold   - See additional Care Plan goals for specific interventions  Outcome: Progressing     Problem: SKIN/TISSUE INTEGRITY - ADULT  Goal: Skin integrity remains intact  Description: INTERVENTIONS  - Assess and document risk factors for pressure ulcer development  - Assess and document skin integrity  - Monitor for areas of redness and/or skin breakdown  - Initiate interventions, skin care algorithm/standards of care as needed  Outcome: Progressing     Problem: CARDIOVASCULAR - ADULT  Goal: Absence of cardiac arrhythmias or at baseline  Description: INTERVENTIONS:  - Continuous cardiac monitoring, monitor vital signs, obtain 12 lead EKG if indicated  - Evaluate effectiveness of antiarrhythmic and heart rate control medications as ordered  - Initiate emergency measures for life threatening arrhythmias  - Monitor electrolytes and administer replacement therapy as ordered  Outcome: Progressing

## 2025-03-04 NOTE — PLAN OF CARE
Patient is alert and oriented times four. She is in afib on the heart monitor with a controlled rate. Her right arm remains swollen and purple, but  is improving from when she was admitted. Her AM INR is 1.4.    Plan: discharge today pending cardiology agreement

## 2025-03-04 NOTE — DISCHARGE SUMMARY
50 MG Tb24  Naloxone HCl 4 MG/0.1ML Liqd  warfarin 2.5 MG Tabs         [place]     Follow-up appointment:   Deena Goemz MD  [address]  [facility] [address]  [phone]    Follow up in 1 week(s)      Daya Chandler MD  [address]  [facility] [address]  [phone]    Follow up      INR draw    Follow up on 3/6/2025      Appointments for Next 30 Days 3/13/2025 - 4/12/2025        Date Arrival Time Visit Type Length Department Provider     3/20/2025  3:00 PM  [facility] US VEIN MAP LOWER BILAT [2156] 60 min [facility]ound - [facility] US RM3    Patient Instructions:     Please arrive 15 minutes prior to your scheduled appointment time.    There are no eating or activity restrictions for this exam.        Location Instructions:     Your appointment will be at the [facility] located at [address] [place] [id number]. The facility is approximately 1/2 mile west of [address] on [address] [place] [address] at the corner of [address] [place] [address] and [address]. Your test is in Building A, which is also labeled as the Emergency or Outpatient building. Please park in the Red Lot and follow the posted signs for guidance. The telephone number is [phone].  [facility] ([facility]) reserves the right to restrict and prohibit the use of video, recording, and photography devices while on the premises. In order to protect the safety and privacy of our patients and staff, no photography, videotaping, or audio recording is allowed in any [facility] department without prior authorization.    Because of the nature of the Emergency Department/Immediate Care, please be advised of the possibility your appointment may be delayed.    Please bring your insurance card and photo ID. You will also need to bring your doctor's order unless your physician's office submitted the order electronically or faxed the order. Without the order, your test may be delayed or postponed.   Children: Children under the age of 12 must have another adult caregiver with them.  Please do not bring your child/children without a caregiver.  Because of the highly sensitive equipment and privacy of all our patients, children will not be permitted in the exam rooms, unless otherwise noted and in accordance with departmental policy.   PATIENT RESPONSIBILITY ESTIMATE  - (Estimate) We will provide you with your estimated remaining deductible and coinsurance balance for your services at the time of check in.  - (Payment) Please be aware that you may be asked for payment at the time of service.  Masks are optional for all patients and visitors, unless otherwise indicated.               3/28/2025  1:00 PM  [facility] US ART DUPLX LWER [1533] 60 min [facility] Ultrasound [facility] US RM 2    Patient Instructions:     Please arrive 15 minutes prior to your scheduled appointment time.    There are no eating or activity restrictions for this exam.        Location Instructions:     Your appointment will be at [facility] located at [address] [place] [address].&nbsp; To reach Outpatient Registration, park in the South Parking Garage and enter the double doors located on the ground floor.&nbsp; Proceed to the lobby past the Information Desk to Outpatient Registration. The phone number for this location is [phone].  [facility] ([facility]) reserves the right to restrict and prohibit the use of video, recording, and photography devices while on the premises. In order to protect the safety and privacy of our patients and staff, no photography, videotaping, or audio recording is allowed in any [facility] department without prior authorization.    Because of the nature of the Emergency Department/Immediate Care, please be advised of the possibility your appointment may be delayed.    Please bring your insurance card and photo ID. You will also need to bring your doctor's order unless your physician's office  submitted the order electronically or faxed the order. Without the order, your test may be delayed or postponed.  Children: Children under the age of 12 must have another adult caregiver with them.  Please do not bring your child/children without a caregiver.  Because of the highly sensitive equipment and privacy of all our patients, children will not be permitted in the exam rooms, unless otherwise noted and in accordance with departmental policy.   PATIENT RESPONSIBILITY ESTIMATE  - (Estimate) We will provide you with your estimated remaining deductible and coinsurance balance for your services at the time of check in.  - (Payment) Please be aware that you may be asked for payment at the time of service.  Masks are optional for all patients and visitors, unless otherwise indicated.                      Vital signs:   BP 98/68 (BP Location: Left arm)   Pulse 90   Temp 98.3 °F (36.8 °C) (Oral)   Resp 18   Ht 5' 2\" (1.575 m)   Wt 159 lb 9.8 oz (72.4 kg)   LMP  (LMP Unknown)   SpO2 (!) 85%   BMI 29.19 kg/m²       Physical Exam:    General: No acute distress   Lungs: clear to auscultation  Cardiovascular: S1, S2  Abdomen: Soft    -----------------------------------------------------------------------------------------------  PATIENT DISCHARGE INSTRUCTIONS: See electronic chart    Jeanna Yanes,     Total time spent on discharge plannin minutes     The  Century Cures Act makes medical notes like these available to patients in the interest of transparency. Please be advised this is a medical document. Medical documents are intended to carry relevant information, facts as evident, and the clinical opinion of the practitioner. The medical note is intended as peer to peer communication and may appear blunt or direct. It is written in medical language and may contain abbreviations or verbiage that are unfamiliar.

## 2025-03-11 NOTE — PAT NURSING NOTE
Pre-Surgical Instructions for 3/19/25 with Dr. Hoffmann      Pre-Surgical Testing:    -You are scheduled for non-fasting blood work and an EKG on Thursday, 3/13/25 at 10:15 am at the [facility], located [address] [facility].       Visitor Instructions    -2 visitors are welcome to accompany you the day of surgery.   -Visiting hours are 7:00 am-8:00 pm.      Pre-Op Instructions    Scheduled Surgery: You are scheduled for Vascular Surgery      Date of Procedure: Wednesday, 03/19/25      TENTATIVE time of arrival: 06:00 am     -This is going to be a tentative time of arrival for surgery.   -We will call you the buisness day prior to your surgery in the late afternoon to reconfirm your arrival.   -Please check your voicemail for a message.    -Park in the [place] parking garage at [facility].    -Check in at the [facility] reception desk in the [facility].  -Our  will be there to check you in for your procedure.   -Complimentary [employer] parking is available starting at 6:00 am.      Diet Instructions:     -Do not eat or drink after 10:00 pm. This includes water, gum, candy and food.      Medication Instructions:     CONTINUE to take all of your prescribed medications as directed EXCEPT:    -The morning of surgery ONLY TAKE you Amiodarone, Metoprolol and Plavix with a sip of water.      Medications to Stop:     -The last dose of Jardiance will be Friday, 3/14.    -The last dose of Warfarin will be Saturday, 3/15.    -The last dose of vitamins, supplements, and herbal products will be Tuesday, 3/11.      Skin Prep:     -Shower with Dial Soap the morning of your surgery (or any antibacterial soap).      Admit/Discharge Status:     -You will be admitted to the hospital after surgery.      Discharge Teaching:     -Your nurse will give you specific instructions before discharge.    -Any questions, please call the surgeon's office.      Additional  Instructions:     -Keep personal possessions to a minimum: bring insurance card(s) and picture ID, pajama bottoms, slippers, toiletries, wear comfortable clothing, do not wear contacts-bring glasses/eye glass case.    -Leave all valuables at home, including jewelry.    If you are scheduled to arrive early for 5:30 am, the [facility] reception desk does not open till 5:30 am. It may be dark, but you are in the correct location.     We are open M-F from 8am- 5pm. We are closed on holidays and weekends. We can be reached at [phone].     Thank you

## 2025-03-13 ENCOUNTER — LAB ENCOUNTER (OUTPATIENT)
Dept: LAB | Age: 80
End: 2025-03-13
Attending: SURGERY
Payer: MEDICARE

## 2025-03-13 ENCOUNTER — EKG ENCOUNTER (OUTPATIENT)
Dept: LAB | Age: 80
End: 2025-03-13
Attending: SURGERY
Payer: MEDICARE

## 2025-03-13 DIAGNOSIS — L97.519 RIGHT FOOT ULCER (HCC): ICD-10-CM

## 2025-03-13 DIAGNOSIS — Z01.818 PRE-OP TESTING: ICD-10-CM

## 2025-03-13 DIAGNOSIS — I74.09 AORTOILIAC OCCLUSIVE DISEASE (HCC): ICD-10-CM

## 2025-03-13 LAB
ANTIBODY SCREEN: NEGATIVE
APTT PPP: 30.5 SECONDS (ref 23–36)
ATRIAL RATE: 234 BPM
INR BLD: 1.43 (ref 0.8–1.2)
PROTHROMBIN TIME: 17.6 SECONDS (ref 11.6–14.8)
Q-T INTERVAL: 410 MS
QRS DURATION: 170 MS
QTC CALCULATION (BEZET): 517 MS
R AXIS: -22 DEGREES
RH BLOOD TYPE: POSITIVE
T AXIS: 45 DEGREES
VENTRICULAR RATE: 96 BPM

## 2025-03-13 PROCEDURE — 86901 BLOOD TYPING SEROLOGIC RH(D): CPT

## 2025-03-13 PROCEDURE — 85730 THROMBOPLASTIN TIME PARTIAL: CPT

## 2025-03-13 PROCEDURE — 85610 PROTHROMBIN TIME: CPT

## 2025-03-13 PROCEDURE — 93010 ELECTROCARDIOGRAM REPORT: CPT | Performed by: INTERNAL MEDICINE

## 2025-03-13 PROCEDURE — 36415 COLL VENOUS BLD VENIPUNCTURE: CPT

## 2025-03-13 PROCEDURE — 86850 RBC ANTIBODY SCREEN: CPT

## 2025-03-13 PROCEDURE — 86900 BLOOD TYPING SEROLOGIC ABO: CPT

## 2025-03-13 PROCEDURE — 93005 ELECTROCARDIOGRAM TRACING: CPT

## 2025-03-17 ENCOUNTER — HOSPITAL ENCOUNTER (OUTPATIENT)
Dept: ULTRASOUND IMAGING | Facility: HOSPITAL | Age: 80
Discharge: HOME | End: 2025-03-17
Attending: SURGERY
Payer: MEDICARE

## 2025-03-17 ENCOUNTER — HOSPITAL ENCOUNTER (OUTPATIENT)
Dept: ULTRASOUND IMAGING | Facility: HOSPITAL | Age: 80
Discharge: HOME | DRG: 269 | End: 2025-03-17
Attending: SURGERY
Payer: MEDICARE

## 2025-03-17 DIAGNOSIS — T82.318A BREAKDOWN (MECHANICAL) OF OTHER VASCULAR GRAFTS, INITIAL ENCOUNTER: ICD-10-CM

## 2025-03-17 DIAGNOSIS — I70.235 ATHEROSCLEROSIS OF NATIVE ARTERY OF RIGHT LOWER EXTREMITY WITH ULCERATION OF OTHER PART OF FOOT (HCC): ICD-10-CM

## 2025-03-17 PROCEDURE — 93970 EXTREMITY STUDY: CPT | Performed by: SURGERY

## 2025-03-17 PROCEDURE — 93925 LOWER EXTREMITY STUDY: CPT | Performed by: SURGERY

## 2025-03-18 NOTE — H&P
[facility]    PATIENT'S NAME: NIKHIL BOLAND   ATTENDING PHYSICIAN: John Hoffmann M.D.   PATIENT ACCOUNT#:   [id number]    LOCATION:    MEDICAL RECORD #:   [id number]       YOB: 1945  ADMISSION DATE:       03/20/2025    HISTORY AND PHYSICAL EXAMINATION    HISTORY OF PRESENT ILLNESS:  An 80-year-old white female with significant aortoiliac occlusive disease, bilateral femoral artery occlusive disease, especially the left distal femoral artery, with rest pain and maybe small ulcerations of the right foot.  Patient has significant dependent rubor of the right foot and barely any Doppler flow appreciated in the foot.  She had imaging done which shows plaque disease, at least 60% to 70% blockage of the aorta infrarenal, as well as significant blockage of both common iliac arteries, and then she has bilateral femoral artery occlusive disease.  The patient has significant discomfort of her right foot with rest pain and dependent rubor, and has a hammertoe seen for possible future surgery by Podiatry.  She currently has no chest pain or shortness of breath.  She has been seen and evaluated by Cardiology.  The patient's primary doctor is a Dr. Chandler out of [place] at [facility].  She is seeing a Dr. Hammond, who is her podiatrist right now.  She was offered a second opinion by Dr. Hammond for Dr. Nikki Aquino who is leaving [place] and going to [place] [place].  I offered to follow up with that if she wished, but she decided not to.  She had been placed on Coumadin and has a history of a spontaneous hematoma of the right biceps when amiodarone was added on to her medications by [facility].  This has resolved.  Had recent coronary artery stenting by Dr. Gomez who has cleared her for the surgery and is still being followed by the EP service from [facility] with this arrhythmia, with significant aortoiliac and bilateral femoral occlusive disease.    PAST MEDICAL HISTORY:  Past history of a DVT with possible  factor V clotting disorder and a pulmonary embolism in the past.  She has also had a DVT in the leg.      PAST SURGICAL HISTORY:  She has had previous open heart surgery by Dr. Waddell and Dr. Hendricks 10 to 15 years ago at [facility], with vein taken from the left leg.  She has had problems with arrhythmias and has had cardioversion and treated by Dr. Jose Moreland, for possible future TAVR valve repair.  She also has had an extensive amount of oral surgery and teeth replacement in the past, total abdominal hysterectomy, right total knee replacement surgery, and coronary artery bypass surgery.      MEDICATIONS:  She had been placed on amiodarone as previously stated for arrhythmias and developed problems with her anticoagulation with warfarin; she gets 2.5 mg a day.  She is also on metoprolol succinate, Plavix, amiodarone 200 mg, levothyroxine, Jardiance, and Bumex.  She had been on Jardiance in the past.      ALLERGIES:  She has no known allergies.       FAMILY HISTORY:  There is a family history of coronary artery disease.    SOCIAL HISTORY:  She has a history of smoking but quit back in 2004.  She is , with 3 children.   present with her the whole time.  She used to work as a [employer].  No ETOH abuse or drug abuse.      REVIEW OF SYSTEMS:  No CVA, TIA, visual field defect, or aphasia.  No abdominal pain or back pain.  No nausea or vomiting.  No hematemesis.  No bright red blood per rectum.  No hematuria, dysuria, hemoptysis, cough, sputum production.  No weight loss, fever, chills.      PHYSICAL EXAMINATION:    GENERAL:  She is awake and alert.  She is appropriate.  No acute distress.  VITAL SIGNS:  Her blood pressure is 136/70, heart rate 72, respirations are 20.  HEENT:  Pupils are equal and round.  Sclerae clear.  Mouth without lesions.  NECK:  No cervical bruit.  No JVD.  LUNGS:  Clear to auscultation.  No rales or rhonchi.  HEART:  Normal.  No murmur.  ABDOMEN:  Soft.  No  tenderness or masses.  The abdomen is somewhat obese.  EXTREMITIES:  Femoral, popliteal, and pedal pulses are all diminished to not palpable on both sides.  Significant dependent rubor of the right foot, venous congestion, small abrasions in the area of the right second toe.  A hammertoe is also noted.  Upper extremity pulses are palpable.  NEUROLOGIC:  Orientated x3.    Had recent coronary artery stenting by Dr. Gomez who has cleared her for the surgery and is still being followed by the EP service and [facility] with this arrhythmia, with significant aortoiliac and bilateral femoral occlusive disease.    ASSESSMENT AND PLAN:  I have recommended medical observation and watching, but I am concerned about her losing her leg above the knee.  Discussed risks and complications of surgery with death, myocardial infarction, bleeding, infection, limb loss, graft thrombosis, future limb loss, future graft thrombosis, renal failure, colon ischemia, multisystem organ failure, sepsis, cardiac arrest.  The option of primary amputation has also been discussed with the patient.  She was given the option for an open repair which may be a challenge on her due to her age to tolerate the procedure, which she does not want to do, and an endovascular procedure to try to fix the right femoral artery, may do the left femoral artery at the same time if it does not add onto the operation, and maybe possible access the left femoral artery in the very proximal portion to try to do an endovascular stent graft of the aorta via an 8-Latvian sheath with an 11 mm VBX stent graft and a bilateral common iliac artery stent graft of 9 mm, and then possible right iliofemoral endarterectomy and vein patch.  It would be a very high-risk operation for the patient.  I offered a second opinion if she wishes, as previously stated with Dr. Aquino or Dr. March at [facility].  All questions answered for the patient.    Dictated By John Hoffmann,  M.D.  d: 03/18/2025 10:38:24  t: 03/18/2025 10:59:14  [facility] [id number]  JJW/

## 2025-03-19 ENCOUNTER — ANESTHESIA EVENT (OUTPATIENT)
Dept: CARDIAC SURGERY | Facility: HOSPITAL | Age: 80
End: 2025-03-19
Payer: MEDICARE

## 2025-03-20 ENCOUNTER — HOSPITAL ENCOUNTER (INPATIENT)
Facility: HOSPITAL | Age: 80
LOS: 6 days | Discharge: NURSING, REHAB OR LONG-TERM CARE | End: 2025-03-26
Attending: SURGERY | Admitting: SURGERY
Payer: MEDICARE

## 2025-03-20 ENCOUNTER — HOSPITAL ENCOUNTER (INPATIENT)
Facility: HOSPITAL | Age: 80
LOS: 6 days | Discharge: NURSING, REHAB OR LONG-TERM CARE | DRG: 269 | End: 2025-03-26
Attending: SURGERY | Admitting: SURGERY
Payer: MEDICARE

## 2025-03-20 ENCOUNTER — ANESTHESIA (OUTPATIENT)
Dept: CARDIAC SURGERY | Facility: HOSPITAL | Age: 80
End: 2025-03-20
Payer: MEDICARE

## 2025-03-20 DIAGNOSIS — Z79.01 ANTICOAGULATED ON COUMADIN: ICD-10-CM

## 2025-03-20 DIAGNOSIS — L97.519 RIGHT FOOT ULCER (HCC): ICD-10-CM

## 2025-03-20 DIAGNOSIS — I48.19 PERSISTENT ATRIAL FIBRILLATION (HCC): ICD-10-CM

## 2025-03-20 DIAGNOSIS — M25.511 ACUTE PAIN OF RIGHT SHOULDER: ICD-10-CM

## 2025-03-20 DIAGNOSIS — G89.18 POST-OP PAIN: Primary | ICD-10-CM

## 2025-03-20 DIAGNOSIS — I74.09 AORTOILIAC OCCLUSIVE DISEASE (HCC): ICD-10-CM

## 2025-03-20 DIAGNOSIS — Z01.818 PRE-OP TESTING: ICD-10-CM

## 2025-03-20 DIAGNOSIS — I70.0 ATHEROSCLEROSIS OF AORTA: ICD-10-CM

## 2025-03-20 DIAGNOSIS — F13.20 EPISODIC BENZODIAZEPINE DEPENDENCE (HCC): ICD-10-CM

## 2025-03-20 LAB
ALBUMIN SERPL-MCNC: 3.5 G/DL (ref 3.2–4.8)
ALBUMIN/GLOB SERPL: 1.8 {RATIO} (ref 1–2)
ALP LIVER SERPL-CCNC: 84 U/L
ALT SERPL-CCNC: 16 U/L
ANION GAP SERPL CALC-SCNC: 9 MMOL/L (ref 0–18)
APTT PPP: 26.2 SECONDS (ref 23–36)
AST SERPL-CCNC: 19 U/L (ref ?–34)
BASE EXCESS BLD CALC-SCNC: -1 MMOL/L
BILIRUB SERPL-MCNC: 0.9 MG/DL (ref 0.2–1.1)
BUN BLD-MCNC: 24 MG/DL (ref 9–23)
CA-I BLD-SCNC: 1.18 MMOL/L (ref 1.12–1.32)
CALCIUM BLD-MCNC: 8.6 MG/DL (ref 8.7–10.6)
CHLORIDE SERPL-SCNC: 108 MMOL/L (ref 98–112)
CO2 BLD-SCNC: 26 MMOL/L (ref 22–32)
CO2 SERPL-SCNC: 24 MMOL/L (ref 21–32)
CREAT BLD-MCNC: 0.99 MG/DL
EGFRCR SERPLBLD CKD-EPI 2021: 57 ML/MIN/1.73M2 (ref 60–?)
ERYTHROCYTE [DISTWIDTH] IN BLOOD BY AUTOMATED COUNT: 19 %
GLOBULIN PLAS-MCNC: 2 G/DL (ref 2–3.5)
GLUCOSE BLD-MCNC: 101 MG/DL (ref 70–99)
GLUCOSE BLD-MCNC: 175 MG/DL (ref 70–99)
GLUCOSE BLD-MCNC: 176 MG/DL (ref 70–99)
GLUCOSE BLD-MCNC: 179 MG/DL (ref 70–99)
GLUCOSE BLD-MCNC: 179 MG/DL (ref 70–99)
HCO3 BLD-SCNC: 24.6 MEQ/L
HCT VFR BLD AUTO: 33.6 %
HCT VFR BLD CALC: 30 %
HGB BLD-MCNC: 11.2 G/DL
INR BLD: 1.12 (ref 0.8–1.2)
ISTAT ACTIVATED CLOTTING TIME: 141 SECONDS (ref 74–137)
ISTAT ACTIVATED CLOTTING TIME: 153 SECONDS (ref 74–137)
ISTAT ACTIVATED CLOTTING TIME: 239 SECONDS (ref 74–137)
ISTAT ACTIVATED CLOTTING TIME: 262 SECONDS (ref 74–137)
ISTAT ACTIVATED CLOTTING TIME: 262 SECONDS (ref 74–137)
MCH RBC QN AUTO: 32.3 PG (ref 26–34)
MCHC RBC AUTO-ENTMCNC: 33.3 G/DL (ref 31–37)
MCV RBC AUTO: 96.8 FL
MRSA DNA SPEC QL NAA+PROBE: NEGATIVE
OSMOLALITY SERPL CALC.SUM OF ELEC: 300 MOSM/KG (ref 275–295)
PCO2 BLD: 44.3 MMHG
PH BLD: 7.35 [PH]
PLATELET # BLD AUTO: 214 10(3)UL (ref 150–450)
PO2 BLD: 218 MMHG
POTASSIUM BLD-SCNC: 3.1 MMOL/L (ref 3.6–5.1)
POTASSIUM SERPL-SCNC: 3.7 MMOL/L (ref 3.5–5.1)
PROT SERPL-MCNC: 5.5 G/DL (ref 5.7–8.2)
PROTHROMBIN TIME: 14.5 SECONDS (ref 11.6–14.8)
RBC # BLD AUTO: 3.47 X10(6)UL
RH BLOOD TYPE: POSITIVE
SAO2 % BLD: 100 %
SODIUM BLD-SCNC: 141 MMOL/L (ref 136–145)
SODIUM SERPL-SCNC: 141 MMOL/L (ref 136–145)
WBC # BLD AUTO: 6.4 X10(3) UL (ref 4–11)

## 2025-03-20 PROCEDURE — 04CK0ZZ EXTIRPATION OF MATTER FROM RIGHT FEMORAL ARTERY, OPEN APPROACH: ICD-10-PCS | Performed by: SURGERY

## 2025-03-20 PROCEDURE — 06BP0ZZ EXCISION OF RIGHT SAPHENOUS VEIN, OPEN APPROACH: ICD-10-PCS | Performed by: SURGERY

## 2025-03-20 PROCEDURE — 04V03DZ RESTRICTION OF ABDOMINAL AORTA WITH INTRALUMINAL DEVICE, PERCUTANEOUS APPROACH: ICD-10-PCS | Performed by: SURGERY

## 2025-03-20 PROCEDURE — 04RK07Z REPLACEMENT OF RIGHT FEMORAL ARTERY WITH AUTOLOGOUS TISSUE SUBSTITUTE, OPEN APPROACH: ICD-10-PCS | Performed by: SURGERY

## 2025-03-20 PROCEDURE — 99223 1ST HOSP IP/OBS HIGH 75: CPT | Performed by: NURSE PRACTITIONER

## 2025-03-20 PROCEDURE — B41DYZZ FLUOROSCOPY OF AORTA AND BILATERAL LOWER EXTREMITY ARTERIES USING OTHER CONTRAST: ICD-10-PCS | Performed by: SURGERY

## 2025-03-20 PROCEDURE — 04VC3DZ RESTRICTION OF RIGHT COMMON ILIAC ARTERY WITH INTRALUMINAL DEVICE, PERCUTANEOUS APPROACH: ICD-10-PCS | Performed by: INTERNAL MEDICINE

## 2025-03-20 DEVICE — IMPLANTABLE DEVICE: Type: IMPLANTABLE DEVICE | Site: LEG | Status: FUNCTIONAL

## 2025-03-20 DEVICE — IMPLANTABLE DEVICE: Type: IMPLANTABLE DEVICE | Site: ABDOMEN | Status: FUNCTIONAL

## 2025-03-20 RX ORDER — ASPIRIN 81 MG/1
81 TABLET ORAL DAILY
Status: DISCONTINUED | OUTPATIENT
Start: 2025-03-20 | End: 2025-03-25

## 2025-03-20 RX ORDER — BUMETANIDE 1 MG/1
1 TABLET ORAL
Status: DISCONTINUED | OUTPATIENT
Start: 2025-03-21 | End: 2025-03-26

## 2025-03-20 RX ORDER — CLOPIDOGREL BISULFATE 75 MG/1
75 TABLET ORAL DAILY
Status: DISCONTINUED | OUTPATIENT
Start: 2025-03-20 | End: 2025-03-26

## 2025-03-20 RX ORDER — HYDROCODONE BITARTRATE AND ACETAMINOPHEN 5; 325 MG/1; MG/1
1 TABLET ORAL EVERY 8 HOURS PRN
Status: DISCONTINUED | OUTPATIENT
Start: 2025-03-20 | End: 2025-03-20 | Stop reason: DRUGHIGH

## 2025-03-20 RX ORDER — ACETAMINOPHEN 325 MG/1
650 TABLET ORAL EVERY 4 HOURS PRN
Status: DISCONTINUED | OUTPATIENT
Start: 2025-03-20 | End: 2025-03-26

## 2025-03-20 RX ORDER — LEVOTHYROXINE SODIUM 75 UG/1
75 TABLET ORAL
Status: DISCONTINUED | OUTPATIENT
Start: 2025-03-21 | End: 2025-03-26

## 2025-03-20 RX ORDER — GLYCOPYRROLATE 0.2 MG/ML
INJECTION, SOLUTION INTRAMUSCULAR; INTRAVENOUS AS NEEDED
Status: DISCONTINUED | OUTPATIENT
Start: 2025-03-20 | End: 2025-03-20 | Stop reason: SURG

## 2025-03-20 RX ORDER — MORPHINE SULFATE 2 MG/ML
1 INJECTION, SOLUTION INTRAMUSCULAR; INTRAVENOUS EVERY 2 HOUR PRN
Status: DISCONTINUED | OUTPATIENT
Start: 2025-03-20 | End: 2025-03-26

## 2025-03-20 RX ORDER — LIDOCAINE HYDROCHLORIDE 10 MG/ML
INJECTION, SOLUTION EPIDURAL; INFILTRATION; INTRACAUDAL; PERINEURAL AS NEEDED
Status: DISCONTINUED | OUTPATIENT
Start: 2025-03-20 | End: 2025-03-20 | Stop reason: SURG

## 2025-03-20 RX ORDER — ENOXAPARIN SODIUM 100 MG/ML
40 INJECTION SUBCUTANEOUS NIGHTLY
Status: DISCONTINUED | OUTPATIENT
Start: 2025-03-20 | End: 2025-03-25

## 2025-03-20 RX ORDER — WARFARIN SODIUM 2.5 MG/1
2.5 TABLET ORAL NIGHTLY
Status: DISCONTINUED | OUTPATIENT
Start: 2025-03-20 | End: 2025-03-21

## 2025-03-20 RX ORDER — AMIODARONE HYDROCHLORIDE 200 MG/1
200 TABLET ORAL DAILY
Status: DISCONTINUED | OUTPATIENT
Start: 2025-03-21 | End: 2025-03-26

## 2025-03-20 RX ORDER — MORPHINE SULFATE 2 MG/ML
2 INJECTION, SOLUTION INTRAMUSCULAR; INTRAVENOUS EVERY 2 HOUR PRN
Status: DISCONTINUED | OUTPATIENT
Start: 2025-03-20 | End: 2025-03-26

## 2025-03-20 RX ORDER — BUSPIRONE HYDROCHLORIDE 5 MG/1
10 TABLET ORAL 2 TIMES DAILY
Status: DISCONTINUED | OUTPATIENT
Start: 2025-03-20 | End: 2025-03-26

## 2025-03-20 RX ORDER — NICOTINE POLACRILEX 4 MG
15 LOZENGE BUCCAL
Status: DISCONTINUED | OUTPATIENT
Start: 2025-03-20 | End: 2025-03-26

## 2025-03-20 RX ORDER — IODIXANOL 320 MG/ML
100 INJECTION, SOLUTION INTRAVASCULAR
Status: COMPLETED | OUTPATIENT
Start: 2025-03-20 | End: 2025-03-20

## 2025-03-20 RX ORDER — AMIODARONE HYDROCHLORIDE 200 MG/1
200 TABLET ORAL DAILY
Status: DISCONTINUED | OUTPATIENT
Start: 2025-03-21 | End: 2025-03-20

## 2025-03-20 RX ORDER — HYDROCODONE BITARTRATE AND ACETAMINOPHEN 5; 325 MG/1; MG/1
2 TABLET ORAL EVERY 4 HOURS PRN
Status: DISCONTINUED | OUTPATIENT
Start: 2025-03-20 | End: 2025-03-26

## 2025-03-20 RX ORDER — DEXAMETHASONE SODIUM PHOSPHATE 4 MG/ML
VIAL (ML) INJECTION AS NEEDED
Status: DISCONTINUED | OUTPATIENT
Start: 2025-03-20 | End: 2025-03-20 | Stop reason: SURG

## 2025-03-20 RX ORDER — PROTAMINE SULFATE 10 MG/ML
INJECTION, SOLUTION INTRAVENOUS AS NEEDED
Status: DISCONTINUED | OUTPATIENT
Start: 2025-03-20 | End: 2025-03-20 | Stop reason: SURG

## 2025-03-20 RX ORDER — PANTOPRAZOLE SODIUM 40 MG/1
40 TABLET, DELAYED RELEASE ORAL
Status: DISCONTINUED | OUTPATIENT
Start: 2025-03-20 | End: 2025-03-26

## 2025-03-20 RX ORDER — ONDANSETRON 2 MG/ML
4 INJECTION INTRAMUSCULAR; INTRAVENOUS EVERY 6 HOURS PRN
Status: DISCONTINUED | OUTPATIENT
Start: 2025-03-20 | End: 2025-03-20

## 2025-03-20 RX ORDER — HEPARIN SODIUM 1000 [USP'U]/ML
INJECTION, SOLUTION INTRAVENOUS; SUBCUTANEOUS AS NEEDED
Status: DISCONTINUED | OUTPATIENT
Start: 2025-03-20 | End: 2025-03-20 | Stop reason: SURG

## 2025-03-20 RX ORDER — NICOTINE POLACRILEX 4 MG
30 LOZENGE BUCCAL
Status: DISCONTINUED | OUTPATIENT
Start: 2025-03-20 | End: 2025-03-26

## 2025-03-20 RX ORDER — HYDROCODONE BITARTRATE AND ACETAMINOPHEN 5; 325 MG/1; MG/1
1 TABLET ORAL EVERY 4 HOURS PRN
Status: DISCONTINUED | OUTPATIENT
Start: 2025-03-20 | End: 2025-03-26

## 2025-03-20 RX ORDER — SODIUM CHLORIDE, SODIUM LACTATE, POTASSIUM CHLORIDE, CALCIUM CHLORIDE 600; 310; 30; 20 MG/100ML; MG/100ML; MG/100ML; MG/100ML
INJECTION, SOLUTION INTRAVENOUS CONTINUOUS
Status: DISCONTINUED | OUTPATIENT
Start: 2025-03-20 | End: 2025-03-20

## 2025-03-20 RX ORDER — ALPRAZOLAM 0.25 MG
0.25 TABLET ORAL
Status: DISCONTINUED | OUTPATIENT
Start: 2025-03-20 | End: 2025-03-26

## 2025-03-20 RX ORDER — DEXTROSE MONOHYDRATE 25 G/50ML
50 INJECTION, SOLUTION INTRAVENOUS
Status: DISCONTINUED | OUTPATIENT
Start: 2025-03-20 | End: 2025-03-26

## 2025-03-20 RX ORDER — SODIUM CHLORIDE 9 MG/ML
INJECTION, SOLUTION INTRAVENOUS CONTINUOUS PRN
Status: DISCONTINUED | OUTPATIENT
Start: 2025-03-20 | End: 2025-03-20 | Stop reason: SURG

## 2025-03-20 RX ORDER — ROCURONIUM BROMIDE 10 MG/ML
INJECTION, SOLUTION INTRAVENOUS AS NEEDED
Status: DISCONTINUED | OUTPATIENT
Start: 2025-03-20 | End: 2025-03-20 | Stop reason: SURG

## 2025-03-20 RX ORDER — PHENYLEPHRINE HCL 10 MG/ML
VIAL (ML) INJECTION AS NEEDED
Status: DISCONTINUED | OUTPATIENT
Start: 2025-03-20 | End: 2025-03-20 | Stop reason: SURG

## 2025-03-20 RX ORDER — METOPROLOL SUCCINATE 50 MG/1
50 TABLET, EXTENDED RELEASE ORAL
Status: DISCONTINUED | OUTPATIENT
Start: 2025-03-21 | End: 2025-03-24

## 2025-03-20 RX ORDER — LEVOTHYROXINE SODIUM 75 UG/1
75 TABLET ORAL
Status: DISCONTINUED | OUTPATIENT
Start: 2025-03-20 | End: 2025-03-20

## 2025-03-20 RX ORDER — NEOSTIGMINE METHYLSULFATE 1 MG/ML
INJECTION INTRAVENOUS AS NEEDED
Status: DISCONTINUED | OUTPATIENT
Start: 2025-03-20 | End: 2025-03-20 | Stop reason: SURG

## 2025-03-20 RX ORDER — POTASSIUM CHLORIDE 1500 MG/1
40 TABLET, EXTENDED RELEASE ORAL ONCE
Status: COMPLETED | OUTPATIENT
Start: 2025-03-20 | End: 2025-03-20

## 2025-03-20 RX ORDER — BUPIVACAINE HYDROCHLORIDE 5 MG/ML
INJECTION, SOLUTION EPIDURAL; INTRACAUDAL; PERINEURAL AS NEEDED
Status: DISCONTINUED | OUTPATIENT
Start: 2025-03-20 | End: 2025-03-20 | Stop reason: HOSPADM

## 2025-03-20 RX ADMIN — PROTAMINE SULFATE 5 MG: 10 INJECTION, SOLUTION INTRAVENOUS at 10:57:00

## 2025-03-20 RX ADMIN — HEPARIN SODIUM 11000 UNITS: 1000 INJECTION, SOLUTION INTRAVENOUS; SUBCUTANEOUS at 08:40:00

## 2025-03-20 RX ADMIN — SODIUM CHLORIDE: 9 INJECTION, SOLUTION INTRAVENOUS at 07:16:00

## 2025-03-20 RX ADMIN — PROTAMINE SULFATE 35 MG: 10 INJECTION, SOLUTION INTRAVENOUS at 10:38:00

## 2025-03-20 RX ADMIN — GLYCOPYRROLATE 0.6 MG: 0.2 INJECTION, SOLUTION INTRAMUSCULAR; INTRAVENOUS at 11:39:00

## 2025-03-20 RX ADMIN — ROCURONIUM BROMIDE 20 MG: 10 INJECTION, SOLUTION INTRAVENOUS at 08:47:00

## 2025-03-20 RX ADMIN — LIDOCAINE HYDROCHLORIDE 50 MG: 10 INJECTION, SOLUTION EPIDURAL; INFILTRATION; INTRACAUDAL; PERINEURAL at 07:24:00

## 2025-03-20 RX ADMIN — ROCURONIUM BROMIDE 10 MG: 10 INJECTION, SOLUTION INTRAVENOUS at 10:22:00

## 2025-03-20 RX ADMIN — SODIUM CHLORIDE: 9 INJECTION, SOLUTION INTRAVENOUS at 11:56:00

## 2025-03-20 RX ADMIN — HEPARIN SODIUM 2000 UNITS: 1000 INJECTION, SOLUTION INTRAVENOUS; SUBCUTANEOUS at 08:53:00

## 2025-03-20 RX ADMIN — ROCURONIUM BROMIDE 50 MG: 10 INJECTION, SOLUTION INTRAVENOUS at 07:24:00

## 2025-03-20 RX ADMIN — HEPARIN SODIUM 3000 UNITS: 1000 INJECTION, SOLUTION INTRAVENOUS; SUBCUTANEOUS at 09:40:00

## 2025-03-20 RX ADMIN — NEOSTIGMINE METHYLSULFATE 3 MG: 1 INJECTION INTRAVENOUS at 11:39:00

## 2025-03-20 RX ADMIN — DEXAMETHASONE SODIUM PHOSPHATE 4 MG: 4 MG/ML VIAL (ML) INJECTION at 07:56:00

## 2025-03-20 RX ADMIN — PROTAMINE SULFATE 10 MG: 10 INJECTION, SOLUTION INTRAVENOUS at 10:44:00

## 2025-03-20 RX ADMIN — PHENYLEPHRINE HCL 50 MCG: 10 MG/ML VIAL (ML) INJECTION at 07:24:00

## 2025-03-20 NOTE — HISTORICAL OFFICE NOTE
[facility]  [address] [place] [address]  [phone]      Kera Reed  Progress Note  Demographics:  Name: Kera Reed YOB: 1945  Age: 80, Female Medical Record No: [id number]  Visited Date/Time: 02/12/2025 09:50 AM    Chief Complaints  follow up  History of Present Illness  80-year-old female returns the office.  History of congestive heart failure with mildly reduced ejection fraction, coronary disease status post bypass, ischemic cardiomyopathy, paroxysmal atrial fibrillation, peripheral vascular disease, severe tricuspid regurgitation presents following her peripheral angiogram.  Patient has evidence of bilateral common femoral artery stenosis.  Patient was evaluated by vascular surgery who does not think she is a candidate for a vein patch at this time.    Today's visit  Patient returns the office.  She is evaluated by Dr. Moreland for possible tricuspid valve replacement.  He was deemed at this time she is not yet a candidate for percutaneous tricuspid valve replacement.  She has persistent atrial fibrillation that may be contributing to her symptoms.  Cardiac risk factors Former smoker  Past Medical History  1.Preop testing  2.Atrial fibrillation, persistent  3.Severe tricuspid valve regurgitation  4.PAD (peripheral artery disease)  5.HFrEF (heart failure with reduced ejection fraction)  6.Sore on leg  7.Venous insufficiency  8.History of CABG (coronary artery bypass graft) - Hx  9.CAD native (coronary artery disease)  10.Hypertension (HTN), primary  Family History  No significant family history noted.  Social History  Smoking status Former smoker  Tobacco usage - No (Ex-smoker (finding))  Review of systems  Cardiovascular No history of Chest pain, WEISS, Palpitations, Syncope, PND, Orthopnea, Edema and Claudication  Physical Examination  Vitals Left Arm Sitting  / 68 mmHg, Pulse rate 110 bpm, Height in 5' 2\", BMI: 28.7, Weight in 157 lbs (or) 71.21 kgs and  BSA : 1.79 cc/m²  General Appearance No Acute Distress and Appropriate  Cardiovascular   EKG/Other abnormalities  General: AAO x 3, no distress  EOMI: PERRLA, no conjunctiva  Resp: CTAB, no wheezing, coarse breath sounds  Cardiac: S1, S2, irregularly irregular, no M/R/G,   GI: normal BS, soft, nontender  Ext: Bilateral varicose veins, ulcer on her right second toe  Neuro: no focal deficits  Allergies  No medication allergies noted.  Medications (Info obtained by: Verbal)  1.bumetanide 1 mg tablet, Take 1 tablet orally 2 times a day.  2.buPROPion HCL  mg tablet,12 hr sustained-release, Take 1 tablet orally once a day.  3.busPIRone 10 mg tablet, Take 1 tablet orally 2 times a day.  4.clopidogreL 75 mg tablet, Take 1 tablet orally once a day.  5.Jardiance 10 mg tablet, Take 1 tablet orally once a day.  6.levothyroxine 75 mcg tablet, Take 1 tablet orally once a day.  7.losartan 25 mg tablet, Take 1 tablet orally once a day.  8.Lyrica 50 mg capsule, Take 1 capsule orally 2 times a day.  9.metoprolol succinate  mg tablet,extended release 24 hr, Take 1 tablet orally 2 times a day.  10.potassium gluconate 500 mg (83 mg) tablet, Take 1 tablet orally once a day.  11.trifluridine 1 % eye drops, 1 drop (ophthalmic (eye)) every 4-6 hours.  12.valACYclovir 1 gram tablet, Take 1 tablet orally once a day.  13.warfarin 5 mg tablet, Take 1 tablet orally once a day.  14.Xanax 0.25 mg tablet, Take 1 tablet orally once a day as needed.  Impression  1.Atrial fibrillation, persistent  2.Severe tricuspid valve regurgitation  3.PAD (peripheral artery disease)  4.Venous insufficiency of both lower extremities  5.HFrEF (heart failure with reduced ejection fraction)  6.Class IV claudication  7.Cellulitis of lower leg  8.History of CABG (coronary artery bypass graft) - Hx  9.CAD native (coronary artery disease)  10.Hypertension (HTN), primary  Assessment & Plan  Coronary disease status post CABG x 3  [place] class III angina, [place]  [place] heart association class II-III  Persistent atrial fibrillation  Severe TR  Congestive heart failure with reduced ejection fraction, chronic  Ischemic cardiomyopathy  Claudication RF Class III  Significant peripheral vascular disease  Venous Insufficiency, CEAP IV    Recommendations  Start amiodarone p.o.  Will give her a tapering dose.  Will then schedule patient for a DCCV without MER.  She has a follow-up with electrophysiology shortly thereafter.    Peripheral angiogram shows severe bilateral common femoral artery stenosis with calcifications with bilateral occluded and anterior tibial arteries and two-vessel runoff.  Not currently a candidate for vein patch procedure.  Patient sore on her right toe seems to be healing slowly.    Status post percutaneous intervention to the distal left main and ostial LAD as well as LAD with a drug-eluting stent.  At this time it has been a month since her stent was placed. Continue Plavix as well as Coumadin    Severe tricuspid valve regurgitation.  Patient's recent echo in February 2025 is consistent with severe tricuspid valve regurgitation and RV and RA enlargement.  F/u with Dr Moreland in 6 months    Patient is interested in possible left atrial appendage closure however patient was advised to discuss with her hematologist regarding the need for Coumadin.  She believes she may have been placed on Coumadin due to factor V Leiden after she was found to have a blood clot in her lower extremity  Continue beta-blocker as well as losartan.  Continue Bumex twice daily    Venous ultrasound for venous insufficiency pending, patient would like to wait until after resolution of her tricuspid valve  Your provider has advised for you to (continue) conservative therapy for the treatment of venous insufficiency and varicose veins. This includes wearing compression stockings (20-30mmHg knee high) during waking hours, elevating legs as needed for swelling, daily calf muscle pumping  exercises, and a weight reduction program with a goal BMI of ? 35. Avoid extend periods of standing and you may take analgesics for symptom relief.  Medications Ordered  1.amiodarone 200 mg tablet, Take 2 tablets orally 2 times a day for 7 days. 200mg twice a day for 7 days. after the two weeks once a day  Miscellaneous  1.Weight monitoring (regime/therapy)  Nurses documentation  Refills needed: none  Upcoming surgeries: no  Use of assistive device(s): none  Triage & medication list reviewed by: EC   Patient instructions  Start Amiodarone 400mg twice a day for 7 days,   200 mg twice a day for 7 days and 200mg once a day for 3 months    Cardioversion will call you to schedule the procedure   Follow up 2 weeks after procedure    Your provider has ordered a Cardioversion to be performed at [facility] on February 28,2025 by Dr. Gomez. This procedure is where an electrical shock is delivered to the heart to restore a normal heart rhythm. You will receive IV sedation before the procedure. You must be fasting- nothing to eat or drink after midnight the night prior to procedure.  It is always important to bring all your medications including over the counter medications, vitamins and supplements to your doctor visits. This will assist in providing the best care for your condition. All instructions and patient education will be provided by a cath lab nurse 24- 48 hours prior To procedure.   Make sure you do not skip a dose of your blood thinner   Please have your primary sent us the INR readings at      Lab Details  BASIC METABOLIC PANEL (8)  01/23/2025 09:39:19 AM  GLUCOSE 148 70-99 mg/dL H F  SODIUM 144 136-145 mmol/L  F  POTASSIUM 4.5 3.5-5.1 mmol/L  F  CHLORIDE 104  mmol/L  F  CO2 32.0 21.0-32.0 mmol/L  F  ANION GAP 8 0-18 mmol/L  F  BUN 31 9-23 mg/dL H F  CREATININE 1.38 0.55-1.02 mg/dL H F  CALCIUM 9.9 8.7-10.6 mg/dL  F  OSMOLALITY CALCULATED 307 275-295 mOsm/kg H F  E GFR CR 38 >=60  mL/min/1.73m2 L F  FASTING PATIENT BMP ANSWER Patient not present   F  CBC WITH DIFFERENTIAL WITH PLATELET  01/08/2025 05:00:43 PM  WBC 7.7 4.0-11.0 x10(3) uL  F  RED BLOOD COUNT 4.54 3.80-5.30 x10(6)uL  F  HGB 13.7 12.0-16.0 g/dL  F  HCT 43.8 35.0-48.0 %  F  PLATELETS 280.0 150.0-450.0 10(3)uL  F  MEAN CELL VOLUME 96.5 80.0-100.0 fL  F  MEAN CORPUSCULAR HEMOGLOBIN 30.2 26.0-34.0 pg  F  MEAN CORPUSCULAR HGB CONC 31.3 31.0-37.0 g/dL  F  RED CELL DISTRIBUTION WIDTH CV 18.5 %  F  NEUTROPHIL ABS PRELIM 5.11 1.50-7.70 x10 (3) uL  F  NEUTROPHIL ABSOLUTE 5.11 1.50-7.70 x10(3) uL  F  LYMPHOCYTE ABSOLUTE 1.53 1.00-4.00 x10(3) uL  F  MONOCYTE ABSOLUTE 0.89 0.10-1.00 x10(3) uL  F  EOSINOPHIL ABSOLUTE 0.05 0.00-0.70 x10(3) uL  F  BASOPHIL ABSOLUTE 0.03 0.00-0.20 x10(3) uL  F  IMMATURE GRANULOCYTE COUNT 0.04 0.00-1.00 x10(3) uL  F  NEUTROPHIL % 66.8 %  F  LYMPHOCYTE % 20.0 %  F  MONOCYTE % 11.6 %  F  EOSINOPHIL % 0.7 %  F  BASOPHIL % 0.4 %  F  IMMATURE GRANULOCYTE RATIO % 0.5 %  F  POCT GLUCOSE  11/18/2024 08:50:39 AM  POC GLUCOSE 93 70-99 mg/dL  F  CPOE Orders carried out by: Angeles Duggan and Deena Gomez MD  Care Providers: Epifanio Bocanegra MD, Hilda Allen and Deena Gomez MD  Electronically Authenticated by  Deena Gomez MD  02/14/2025 04:13:25 PM  Disclaimer: Components of this note were documented using voice recognition system and are subject to errors not corrected at proofreading. Contact the author of this note for any clarifications.

## 2025-03-20 NOTE — PLAN OF CARE
Received patient after or alert and oriented X 4. No signs of distress. Attempted to wean off oxygen, when patient sleeps she desats resting on 2 L NC. Pulse and groin checks done per protocol. Per KEREN Quinteros to have HOB 30 degrees, advance diet as tolerated. Medications given per MAR, documentation per flowsheets.

## 2025-03-20 NOTE — CONSULTS
ICU  Critical Care APRN Consult Note      Admission date: 3/20/2025      History of Presenting Illness  80 year old female with past medical history of aortoiliac occlusive disease, bilateral femoral occlusive disease, CAD, CABG, DVT,  factor V clotting disorder, PE, HFpEF, persistent atrial fibrillation, HTN, HLD and anxiety who is being admitted to ICU s/p right ileofemoral endarterectomy with vein patch, aortic and right iliac stent graft on 3/20 with Dr. Hoffmann,    Admitted to ICU stable, no requiring pressors. Alert and oriented x4. Pain currently controlled.     Past Medical History:    Allergic rhinitis    Congestive heart disease (HCC)    Coronary atherosclerosis    Deep vein thrombosis (HCC)    Disorder of thyroid    Factor V deficiency (HCC)    High blood pressure    High cholesterol    History of blood transfusion    Hypothyroidism    Pulmonary embolism (HCC)    Shortness of breath     Past Surgical History:   Procedure Laterality Date    Bypass surgery      cardiac    Cabg      Colonoscopy  2018    [facility] Dr[facility] Hyde    Colonoscopy      Hysterectomy      Knee replacement surgery      Total knee replacement       Social History     Socioeconomic History    Marital status:    Tobacco Use    Smoking status: Former     Current packs/day: 0.00     Average packs/day: 1 pack/day for 40.0 years (40.0 ttl pk-yrs)     Types: Cigarettes     Start date: 1960     Quit date: 2000     Years since quittin.2    Smokeless tobacco: Never   Vaping Use    Vaping status: Never Used   Substance and Sexual Activity    Alcohol use: Not Currently    Drug use: No     Social Drivers of Health     Food Insecurity: No Food Insecurity (3/1/2025)    NCSS - Food Insecurity     Worried About Running Out of Food in the Last Year: No     Ran Out of Food in the Last Year: No   Transportation Needs: No Transportation Needs (3/1/2025)    NCSS - Transportation     Lack of Transportation: No   Housing Stability: Not At  Risk (3/1/2025)    NCSS - Housing/Utilities     Has Housing: Yes     Worried About Losing Housing: No     Unable to Get Utilities: No     Family History   Family history unknown: Yes       Allergies Allergies[1]      Review of Systems:   A comprehensive 10 point review of systems was completed.  Pertinent positives and negatives noted in the HPI.      Current Hospital Medications  Current Facility-Administered Medications   Medication Dose Route Frequency    [START ON 3/21/2025] amiodarone (Pacerone) tab 200 mg  200 mg Oral Daily    [START ON 3/21/2025] metoprolol succinate ER (Toprol XL) 24 hr tab 50 mg  50 mg Oral Daily Beta Blocker    levothyroxine (Synthroid) tab 75 mcg  75 mcg Oral Before breakfast     Facility-Administered Medications Ordered in Other Encounters   Medication Dose Route Frequency    ceFAZolin (Ancef) 2g in 10mL IV syringe premix   Intravenous PRN    fentaNYL (Sublimaze) 50 mcg/mL injection   Intravenous PRN    lidocaine PF (Xylocaine-MPF) 1% injection   Injection PRN    propofol (Diprivan) 10 MG/ML injection   Intravenous PRN    rocuronium (Zemuron) 50 mg/5mL injection   Intravenous PRN    phenylephrine (Donta-Synephrine) 10 MG/ML injection   Intravenous PRN    phenylephrine (Donta-Synephrine) 50 mg in sodium chloride 0.9% 250 mL infusion   Intravenous Continuous PRN    dexamethasone (Decadron) 4 MG/ML injection   Intravenous PRN    sodium chloride 0.9% infusion   Intravenous Continuous PRN    heparin (Porcine) 1000 UNIT/ML injection   Intravenous PRN    protamine 10 mg/mL injection   Intravenous PRN       OBJECTIVE  VITAL SIGNS:   Temp:  [98.2 °F (36.8 °C)] 98.2 °F (36.8 °C)  Pulse:  [97] 97  Resp:  [15] 15  BP: (109)/(92) 109/92  SpO2:  [96 %] 96 %           INTAKE/OUTPUT:    Intake/Output Summary (Last 24 hours) at 3/20/2025 1158  Last data filed at 3/20/2025 1054  Gross per 24 hour   Intake 10 ml   Output 600 ml   Net -590 ml         Physical Exam:    General Appearance: Alert, cooperative, no  acute distress  Neck: No JVD  Lungs: Clear to auscultation bilaterally, respirations unlabored  Heart: Regular rate and irregular rhythm, S1 and S2 normal, no murmur, rub or gallop  Abdomen: Soft, non-tender, bowel sounds active   Extremities: Atraumatic, no cyanosis or edema, capillary refill <3 sec.    Pulses: 2+ palpable DP/PT RLE, 1+ to LLE, right groin dressing intact--no hematoma or drainage  Skin: Skin color, texture, turgor normal for ethnicity, no rashes or lesions, warm and dry  Neurologic: normal strength, sensation intact bilateral LEs, no numbness or tingling    Data This Admission:  Radiology:    No results found.      LABS:     Lab Results   Component Value Date    INR 1.12 03/20/2025         Assessment and Plan      Active Problems:  Infrarenal aortic stenosis, iliac stenosis and stenosis of right common femoral artery POD#0 s/p right ileofemoral endarterectomy with vein patch, aortic stent and right iliac stent graft 3/20      Neuro/Psych: No current issues. Alert and oriented x4, no focal deficits.     #H/o anxiety  -Xanax nightly PRN    Cardiovascular:  #Infrarenal aortic stenosis, iliac stenosis and stenosis of right common femoral artery  POD#0 s/p right ileofemoral endarterectomy with vein patch, aortic and right iliac stent graft 3/20  -ASA, statin, plavix, BB  -BP goal 90-160mmHg  -Post-op labs pending  -Neurovascular checks per protocol  -Pain mgt  -IVFs  -Vascular surgery following    #H/o HTN  -BP goal as noted above--NTG gtt off  -BB, holding PTA bumex  -Cards consulted    H/o CAD s.p CABG (2012) PCI to LM/LAD 11/2024  -Plavix  -statin  -BB    #Chronic HFpEF  #Severe TR  -Last TTE 2/2025 with EF 50-55%, RV reduced fxn, dilated RA, severe tricuspid regurg  -BB  -Holding PTA bumex, jardiance    Persistent Atrial fibrillation  -PTA amio  -Plan for eventually DCCV as outpatient  -EKG pending  -Cards following      Pulmonary: On 2 L NC. Encourage IS post-op. If unable to wean O2, obtain CXR.        Renal: Strict I/Os. Post-op BMP pending.       GI: No current issues. CMP pending. Bowel regimen.      ID: Afebrile, CBC pending. S/p cefazolinn intra-op.       Heme/Onc:  CBC pending post-op  #H/o DVT, PE  #H/o Factor V deficiency  -Holding PTA coumadin post-op--resume when able      Endocrine:  #Newly diagnosed DM2  -A1c 6.8  -Accu checks  -ISS (Not on insulin at home)    #Hypothyroidism  -Synthroid    ICU Checklist  Nutrition: cardiac  Analgesia: yes  Sedation:   Thromboembolism PPX: lovenox  Stress Ulcer PPX: ppi  Bowel Regimen: yes  Glucose control: yes  Lines/drains/tubes: piv, courtney  Therapies: PT/OT when able    Code status: Full  Disp: ICU monitoring      Plan of care discussed with Critical Care Intensivist, Dr. Rylee Talbert, [facility]  ICU  Phone  [phone]   Pager [phone]               [1] No Known Allergies

## 2025-03-20 NOTE — HISTORICAL OFFICE NOTE
[facility]  [address] [place] [address], 4[address] [place] [address]  [phone]      Kera Reed  Progress Note  Demographics:  Name: Kera Reed YOB: 1945  Age: 79, Female Medical Record No: [id number]  Visited Date/Time: 12/30/2024 02:20 PM    Chief Complaints  Follow up after Cardioversion 12/06  History of Present Illness  Ms. Reed is an 79yoF with h/o paroxysmal atrial fibrillation, CAD s/p CABG 2012 and now PCI to LM/LAD 11/2024,  HFrEF (EF 35-40% with basal inferior hypokinesis), APLS c/b DVT/PE on warfarin following up for atrial fibrillation. She is now on treatment for RLE cellulitis. Also with shingles of the L eye. And with ulcer of right second toe. She has severe TR on MER.     She had a MER/DCCV earlier in Dec but with reversion to AF. Now with heart rates 90's- 100's.  Cardiac risk factors Former smoker  Past Medical History  1.Atrial fibrillation, persistent  2.Severe tricuspid valve regurgitation  3.PAD (peripheral artery disease)  4.HFrEF (heart failure with reduced ejection fraction)  5.Sore on leg  6.Venous insufficiency  7.History of CABG (coronary artery bypass graft) - Hx  8.CAD native (coronary artery disease)  9.Hypertension (HTN), primary  Family History  No significant family history noted.  Social History  Smoking status Former smoker  Tobacco usage - No (Ex-smoker (finding))  Review of systems  Cardiovascular No history of Chest pain, WEISS, Palpitations, Syncope, PND, Orthopnea, Edema and Claudication  Physical Examination  Vitals Right Arm Sitting  / 86 mmHg, Pulse rate 96 bpm, Height in 5' 2\", BMI: 28, Weight in 153 lbs (or) 69.4 kgs and BSA : 1.76 cc/m²  General Appearance No Acute Distress  Cardiovascular   EKG/Other abnormalities  General: AAO x 3, no distress  HEENT: +redness, watery L eye  Resp: CTAB, no wheezes or crackles  Cardiac: Irregular, borderline tachycardic, normal S1/S2, RRR, no m/r/g  GI: soft, nontender, no masses  Ext: warm  and well perfused,  Trace LLE edema. RLE redness, nonpitting edema  Neuro: no focal deficits     EKG: AF with RBBB    MER 12/6/2024: Mildly reduced systolic function EF 40 to 45%, severe biatrial enlargement with right to left septal bowing, severe TR, mild MR    CTA pelvis with runoff: Notable for saccular outpouching concerning for aneurysm versus penetrating ulcer of the infrarenal abdominal aorta, high-grade stenosis of the midportion of the right common femoral artery, high-grade stenosis of the mid segment of the left common femoral artery  Allergies  No known medication allergies.  Medications  1.bumetanide 1 mg tablet, Take 1 tablet orally once a day.  2.buPROPion HCL  mg tablet,12 hr sustained-release, Take 1 tablet orally once a day.  3.busPIRone 10 mg tablet, Take 1 tablet orally 2 times a day.  4.Cefuroxime 50mg , Twice daily.  5.clopidogreL 75 mg tablet, Take 1 tablet orally once a day.  6.erythromycin 5 mg/gram (0.5 %) eye ointment, Apply the ointment (ophthalmic (eye)) once a day.  7.Jardiance 10 mg tablet, Take 1 tablet orally once a day.  8.levothyroxine 75 mcg tablet, Take 1 tablet orally once a day.  9.losartan 25 mg tablet, Take 1 tablet orally once a day.  10.Lyrica 50 mg capsule, Take 1 capsule orally 2 times a day.  11.metoprolol succinate  mg tablet,extended release 24 hr, Take 1 tablet orally 2 times a day.  12.potassium gluconate 500 mg (83 mg) tablet, Take 1 tablet orally once a day.  13.traMADoL 50 mg tablet, Take 1 tablet orally once a day.  14.valACYclovir 1 gram tablet, Take 1 tablet orally once a day.  15.warfarin 5 mg tablet, Take 1 tablet orally once a day.  16.Xanax 0.25 mg tablet, Take 1 tablet orally once a day as needed.  Impression  1.Atrial fibrillation, persistent  2.Severe tricuspid valve regurgitation  3.PAD (peripheral artery disease)  4.Venous insufficiency of both lower extremities  5.HFrEF (heart failure with reduced ejection fraction)  6.Class IV  claudication  7.Cellulitis of lower leg  8.History of CABG (coronary artery bypass graft) - Hx  9.CAD native (coronary artery disease)  10.Hypertension (HTN), primary  Assessment & Plan  Ms. Reed is an 79yoF with h/o paroxysmal atrial fibrillation, CAD s/p CABG 2012 now s/p PCI 11/2024, recently diagnosed HFrEF (EF 35-40% with basal inferior hypokinesis), APLS c/b DVT/PE on warfarin referred for atrial fibrillation.     1. HFrEF - likely ischemic cardiomyopathy. Inferior wall motion abnormality, EF 35-40%     - Now s/p PCI to LM/LAD 11/18/24     - Continue GDMT: Jardiance, losartan, metoprolol     - Continue bumex    2. CAD s/p CABG, PCI 11/18/25      - Continue plavix, warfarin       - Needs to start statin, still deferring    3. Persistent atrial fibrillation      - On metoprolol, ERAF after cardioversion.  Maintaining rate control for now while other medical issues are addressed      - Continue warfarin, INR goal 2-3.       - Plan for ablation when other medical issues    4. Severe TR     - Pt to see structural cardiology    5.  PAD with right toe ulcer       -Severe stenosis of bilateral common femoral arteries on CTA.  Will move up follow-up with Dr. Gomez    6.  Right lower extremity cellulitis -managed by primary care/ID.  She is on cefuroxime.    7.  Shingles affecting left eye -she has an appointment with ophthalmology.  On antivirals.    Plan: Move up appointment with Dr. Gomez to discuss PAD. Confirm structural appt. Follow up in 3-4 months  Future appointments  1.Follow up visit - Mac Hernández MD (4 Months)  Miscellaneous  1.Weight monitoring (regime/therapy)  Nurses documentation  Upcoming surgeries: no  Use of assistive devices(s): no  Refills: no  EKG: no  Triage & medication list reviewed by: PANTERA MACHADO   Patient instructions  Plan:   -Move up appointment with Dr. Gomez to discuss PAD  -Follow up with Dr. Hernández in 3-4 months  Lab Details  POCT GLUCOSE  11/18/2024 08:50:39 AM  POC GLUCOSE 93 70-99  mg/dL  F  CBC WITH DIFFERENTIAL WITH PLATELET  11/14/2024 07:24:21 PM  WBC 8.2 4.0-11.0 x10(3) uL  F  RED BLOOD COUNT 4.12 3.80-5.30 x10(6)uL  F  HGB 12.7 12.0-16.0 g/dL  F  HCT 41.5 35.0-48.0 %  F  PLATELETS 248.0 150.0-450.0 10(3)uL  F  MEAN CELL VOLUME 100.7 80.0-100.0 fL H F  MEAN CORPUSCULAR HEMOGLOBIN 30.8 26.0-34.0 pg  F  MEAN CORPUSCULAR HGB CONC 30.6 31.0-37.0 g/dL L F  RED CELL DISTRIBUTION WIDTH CV 16.5 %  F  NEUTROPHIL ABS PRELIM 6.00 1.50-7.70 x10 (3) uL  F  NEUTROPHIL ABSOLUTE 6.00 1.50-7.70 x10(3) uL  F  LYMPHOCYTE ABSOLUTE 1.21 1.00-4.00 x10(3) uL  F  MONOCYTE ABSOLUTE 0.88 0.10-1.00 x10(3) uL  F  EOSINOPHIL ABSOLUTE 0.03 0.00-0.70 x10(3) uL  F  BASOPHIL ABSOLUTE 0.03 0.00-0.20 x10(3) uL  F  IMMATURE GRANULOCYTE COUNT 0.07 0.00-1.00 x10(3) uL  F  NEUTROPHIL % 72.9 %  F  LYMPHOCYTE % 14.7 %  F  MONOCYTE % 10.7 %  F  EOSINOPHIL % 0.4 %  F  BASOPHIL % 0.4 %  F  IMMATURE GRANULOCYTE RATIO % 0.9 %  F  BASIC METABOLIC PANEL (8)  11/14/2024 04:32:21 PM  GLUCOSE 103 70-99 mg/dL H F  SODIUM 140 136-145 mmol/L  F  POTASSIUM 3.9 3.5-5.1 mmol/L  F  CHLORIDE 110  mmol/L  F  CO2 27.0 21.0-32.0 mmol/L  F  ANION GAP 3 0-18 mmol/L  F  BUN 24 9-23 mg/dL H F  CREATININE 1.06 0.55-1.02 mg/dL H F  CALCIUM 9.6 8.7-10.4 mg/dL  F  OSMOLALITY CALCULATED 294 275-295 mOsm/kg  F  E GFR CR 53 >=60 mL/min/1.73m2 L F  FASTING PATIENT BMP ANSWER No   F  Care Providers: Frannie MOTT, Joanne MOTT and Mac Hernández MD  Electronically Authenticated by  Mac Hernández MD  12/30/2024 04:31:03 PM  Disclaimer: Components of this note were documented using voice recognition system and are subject to errors not corrected at proofreading. Contact the author of this note for any clarifications.

## 2025-03-20 NOTE — CONSULTS
[facility]OSPITAL[facility]  CONSULT     Kera Reed Patient Status:  Inpatient    1945 MRN [id number]   Location [facility] 4SW-A Attending John Hoffmann MD   Hosp Day # 0 PCP Daya Chandler MD     Reason for consult: Medical managemen    Requested by: Dr. John Hoffmann    Subjective:   History of Present Illness:     Kera Reed is a 80 year old female with past medical history significant for CAD with prev CABG , PCI of LM/LAD 2024, ICM, persistent atrial fibrillation, HTN, prev PE/DVT with Factor V Leiden on Coumadin, PVD was admitted following an elective femoral endarterectomy and aortic and right iliac stent graft.  Pt is doing well post operatively.  She tolerated the procedure well.  She denies nausea, vomiting, fever, chills, chest pain or shortness of breath.    History/Other:    Past Medical History:  Past Medical History:    Allergic rhinitis    Congestive heart disease (HCC)    Coronary atherosclerosis    Deep vein thrombosis (HCC)    Disorder of thyroid    Factor V deficiency (HCC)    High blood pressure    High cholesterol    History of blood transfusion    Hypothyroidism    Pulmonary embolism (HCC)    Shortness of breath     Past Surgical History:   Past Surgical History:   Procedure Laterality Date    Bypass surgery      cardiac    Cabg      Colonoscopy  2018    [facility]    Colonoscopy      Hysterectomy      Knee replacement surgery      Total knee replacement        Family History:   Family History   Family history unknown: Yes     Social History:    reports that she quit smoking about 25 years ago. Her smoking use included cigarettes. She started smoking about 65 years ago. She has a 40 pack-year smoking history. She has never used smokeless tobacco. She reports that she does not currently use alcohol. She reports that she does not use drugs.     Allergies: Allergies[1]    Medications:  Medications Ordered Prior to Encounter[2]    Review of Systems:   A  comprehensive review of systems was completed.    Pertinent positives and negatives noted in the HPI.    Objective:   Physical Exam:    BP (!) 109/92 (BP Location: Right arm)   Pulse 98   Temp 97 °F (36.1 °C) (Temporal)   Resp 12   Ht 5' 2\" (1.575 m)   Wt 158 lb 4.8 oz (71.8 kg)   LMP  (LMP Unknown)   SpO2 94%   BMI 28.95 kg/m²   General: No acute distress, Alert  Respiratory: No rhonchi, no wheezes  Cardiovascular: S1, S2. Regular rate and rhythm  Abdomen: Soft, NT/ND, +BS  Neuro: No new focal deficits  Extremities: LLE edema      Results:    Labs:      Labs Last 24 Hours:  Recent Labs   Lab 03/20/25  0559 03/20/25  1227   WBC  --  6.4   HGB  --  11.2*   MCV  --  96.8   PLT  --  214.0   INR 1.12  --        Recent Labs   Lab 03/20/25  1227   *   BUN 24*   CREATSERUM 0.99   CA 8.6*   ALB 3.5      K 3.7      CO2 24.0   ALKPHO 84   AST 19   ALT 16   BILT 0.9   TP 5.5*       Recent Labs   Lab 03/20/25  0559   PTP 14.5   INR 1.12       No results for input(s): \"TROP\", \"CK\" in the last 168 hours.      Imaging: Imaging data reviewed in Epic.    Assessment & Plan:      #Infrarenal aortic stenosis, iliac stenosis, stenosis of right common femoral artery s/p stent graft placement of the abdominal aorta, right common iliac and endarterectomy  Per cardiology, per vascular surgery    #h/o DVT/PE, possible Factor V Leiden  On coumadin which is on hold  Resume coumadin when ok with vascular surgery    #CHF, compensated, resume GDMT    #DMII, hyperglycemia protocol    #CAD with prev CABG 2012, PCI in 11/2024    #HTN, controlled    #DL, not on a statin    #Hypothyroidism, Synthroid    #A fib, persistent, on Amiodarone, Metoprolol, and Coumadin which is held for now    #COPD, stable     #Anxiety/depression, resume PTA meds, Xanax, Buspirone        Plan of care discussed with pt and .    Anabela Najera MD  3/20/2025    The 21st Century Cures Act makes medical notes like these available to patients in  the interest of transparency. Please be advised this is a medical document. Medical documents are intended to carry relevant information, facts as evident, and the clinical opinion of the practitioner. The medical note is intended as peer to peer communication and may appear blunt or direct. It is written in medical language and may contain abbreviations or verbiage that are unfamiliar.            [1] No Known Allergies  [2]   Current Facility-Administered Medications on File Prior to Encounter   Medication Dose Route Frequency Provider Last Rate Last Admin    [COMPLETED] iopamidol 76% (ISOVUE-370) injection for power injector  100 mL Intravenous ONCE PRN Deena Gomez MD   100 mL at 03/01/25 1153    [COMPLETED] lidocaine PF (Xylocaine-MPF) 1 % injection             [COMPLETED] heparin (Porcine) 5000 UNIT/ML injection             [COMPLETED] midazolam (Versed) 2 MG/2ML injection             [COMPLETED] fentaNYL (Sublimaze) 50 mcg/mL injection             [COMPLETED] iopamidol 76% (ISOVUE-370) injection for power injector  100 mL Intravenous ONCE PRN Adelita Villafuerte APRN   100 mL at 12/27/24 1120     Current Outpatient Medications on File Prior to Encounter   Medication Sig Dispense Refill    amiodarone 200 MG Oral Tab Take 1 tablet (200 mg total) by mouth daily. 30 tablet 0    metoprolol succinate ER 50 MG Oral Tablet 24 Hr Take 1 tablet (50 mg total) by mouth Daily Beta Blocker. 30 tablet 0    warfarin 2.5 MG Oral Tab Take 1 tablet (2.5 mg total) by mouth nightly. 30 tablet 0    HYDROcodone-acetaminophen 5-325 MG Oral Tab Take 1 tablet by mouth every 8 (eight) hours as needed for Pain. 10 tablet 0    Naloxone HCl 4 MG/0.1ML Nasal Liquid 4 mg by Intranasal route as needed (May repeat as needed in other nostril if symptoms persist). If patient remains unresponsive, repeat dose in other nostril 2-5 minutes after first dose. 1 kit 0    busPIRone 10 MG Oral Tab Take 1 tablet (10 mg total) by mouth in the morning and 1  tablet (10 mg total) before bedtime.      bumetanide 1 MG Oral Tab Take 1 tablet (1 mg total) by mouth BID (Diuretic).      Multiple Vitamins-Minerals (MULTIVITAMIN ADULTS 50+ OR) Take 1 tablet by mouth daily.      empagliflozin (JARDIANCE) 10 MG Oral Tab Take 1 tablet (10 mg total) by mouth daily.      Potassium Chloride ER 20 MEQ Oral Tab CR Take 20 mEq by mouth daily.      clopidogrel 75 MG Oral Tab Take 1 tablet (75 mg total) by mouth daily. 90 tablet 1    ALPRAZolam 0.25 MG Oral Tab Take 1 tablet (0.25 mg total) by mouth daily as needed.      Levothyroxine Sodium 75 MCG Oral Tab Take 1 tablet (75 mcg total) by mouth once daily. 90 tablet 2

## 2025-03-20 NOTE — ANESTHESIA PREPROCEDURE EVALUATION
PRE-OP EVALUATION    Patient Name: Kera Reed    Admit Diagnosis: aortoiliac disease, right foot ulcer    Pre-op Diagnosis: aortoiliac disease, right foot ulcer    RIGHT ILIOFEMORAL ENDARTERECTOMY WITH VEIN PATCH, POSSIBLE LEFT FEMORAL ENDARTERECTOMY WITH VEIN PATCH, AORTIC STENT GRAFT, RIGHT /LEFT ILIAC STENT GRAFT    Anesthesia Procedure: RIGHT ILIOFEMORAL ENDARTERECTOMY WITH VEIN PATCH, POSSIBLE LEFT FEMORAL ENDARTERECTOMY WITH VEIN PATCH, AORTIC STENT GRAFT, RIGHT /LEFT ILIAC STENT GRAFT    Surgeons and Role:     * Jhon Hoffmann MD - Primary     * Emmett Gomez MD    Pre-op vitals reviewed.  Temp: 98.2 °F (36.8 °C)  Pulse: 97  Resp: 15  BP: 109/92  SpO2: 96 %  Body mass index is 28.95 kg/m².    Current medications reviewed.  Hospital Medications:   iodixanol (VISIPAQUE) 320 MG/ML injection 100 mL  100 mL Injection ONCE PRN       Outpatient Medications:   Prescriptions Prior to Admission[1]    Allergies: Patient has no known allergies.      Anesthesia Evaluation        Anesthetic Complications           GI/Hepatic/Renal      (+) GERD       (+) chronic renal disease and CRI  (+) liver disease                 Cardiovascular  Comment: MER    FINDINGS:   · Overall reduced LV systolic function with estimated LVEF 40-45% without wall motion abnormalities.  Marked biatrial enlargement with interatrial right-to-left septal bowing.  · Mitral valve with mild nonspecific thickening with mild regurgitation via multiple jets.  · Tricuspid valve has severe regurgitation. Suspect related to malcoaptation. Annulus measuring approximately 4.7 cm.   · There was a trileaflet aortic valve without stenosis or significant insufficiency.   · Velocities of 35 cm/s were seen in the JONAS. There was no evidence for intracardiac mass or thrombus in the JONAS.  · There was no evidence for reversal of flow in the pulmonary veins.   · There was mild scattered atherosclerotic plaque in the visualized aorta without evidence for mobile  atheromata or thrombus.  · No pericardial effusion.        Exercise tolerance: poor     MET: <=4      (+) hypertension     (+) CAD    (+) CABG/stent    (+) valvular problems/murmurs and TR    (+) dysrhythmias and atrial fibrillation  (+) CHF                Endo/Other      (+) diabetes  type 2,       (+) anemia                   Pulmonary               (+) shortness of breath            Neuro/Psych      (+) depression  (+) anxiety         (+) neuromuscular disease                     Past Surgical History:   Procedure Laterality Date    Bypass surgery      cardiac    Cabg      Colonoscopy  2018    Wilfredo Hyde    Colonoscopy      Hysterectomy      Knee replacement surgery      Total knee replacement       Social History     Socioeconomic History    Marital status:    Tobacco Use    Smoking status: Former     Current packs/day: 0.00     Average packs/day: 1 pack/day for 40.0 years (40.0 ttl pk-yrs)     Types: Cigarettes     Start date: 1960     Quit date: 2000     Years since quittin.2    Smokeless tobacco: Never   Vaping Use    Vaping status: Never Used   Substance and Sexual Activity    Alcohol use: Not Currently    Drug use: No     History   Drug Use No     Available pre-op labs reviewed.  Lab Results   Component Value Date    WBC 5.9 2025    RBC 3.71 (L) 2025    HGB 11.8 (L) 2025    HCT 34.3 (L) 2025    MCV 92.5 2025    MCH 31.8 2025    MCHC 34.4 2025    RDW 18.6 2025    .0 2025     Lab Results   Component Value Date     2025    K 4.6 2025     2025    CO2 25.0 2025    BUN 25 (H) 2025    CREATSERUM 0.86 2025    GLU 99 2025    CA 8.8 2025     Lab Results   Component Value Date    INR 1.12 2025    INR 8.0 (A) 2025         Airway      Mallampati: III  Mouth opening: 3 FB  TM distance: 4 - 6 cm  Neck ROM: limited Cardiovascular      Rhythm: irregular  Rate:  normal     Dental             Pulmonary      Breath sounds clear to auscultation bilaterally.               Other findings              ASA: 4   Plan: general  NPO status verified and patient meets guidelines.    Post-procedure pain management plan discussed with surgeon and patient.    Comment: Options, risks and benefits of anesthesia as outlined in the anesthesia consent were reviewed with the patient. Risks and benefits of GA including sore throat, allergy, nausea, vomiting, dental trauma, pain management modalities were all discussed. Particularly the risk of dental trauma with weakened teeth or crowns, partials, fillings and any non natural teeth due to instrumentation of oral cavity and airway. Patient understands risks and verbally agreed to proceed. All questions answered.The consent was signed without further questions.        Plan/risks discussed with: patient and spouse  Use of blood product(s) discussed with: patient and spouse    Consented to blood products.          Present on Admission:  **None**             [1]   Medications Prior to Admission   Medication Sig Dispense Refill Last Dose/Taking    amiodarone 200 MG Oral Tab Take 1 tablet (200 mg total) by mouth daily. 30 tablet 0 3/20/2025 Morning    metoprolol succinate ER 50 MG Oral Tablet 24 Hr Take 1 tablet (50 mg total) by mouth Daily Beta Blocker. 30 tablet 0 3/20/2025 Morning    warfarin 2.5 MG Oral Tab Take 1 tablet (2.5 mg total) by mouth nightly. 30 tablet 0 3/15/2025    HYDROcodone-acetaminophen 5-325 MG Oral Tab Take 1 tablet by mouth every 8 (eight) hours as needed for Pain. 10 tablet 0 3/19/2025    Naloxone HCl 4 MG/0.1ML Nasal Liquid 4 mg by Intranasal route as needed (May repeat as needed in other nostril if symptoms persist). If patient remains unresponsive, repeat dose in other nostril 2-5 minutes after first dose. 1 kit 0 Taking As Needed    busPIRone 10 MG Oral Tab Take 1 tablet (10 mg total) by mouth in the morning and 1 tablet  (10 mg total) before bedtime.   3/19/2025    bumetanide 1 MG Oral Tab Take 1 tablet (1 mg total) by mouth BID (Diuretic).   3/19/2025    Multiple Vitamins-Minerals (MULTIVITAMIN ADULTS 50+ OR) Take 1 tablet by mouth daily.   3/14/2025    empagliflozin (JARDIANCE) 10 MG Oral Tab Take 1 tablet (10 mg total) by mouth daily.   3/14/2025    Potassium Chloride ER 20 MEQ Oral Tab CR Take 20 mEq by mouth daily.   3/19/2025    clopidogrel 75 MG Oral Tab Take 1 tablet (75 mg total) by mouth daily. 90 tablet 1 3/20/2025 Morning    ALPRAZolam 0.25 MG Oral Tab Take 1 tablet (0.25 mg total) by mouth daily as needed.   3/20/2025 Morning    Levothyroxine Sodium 75 MCG Oral Tab Take 1 tablet (75 mcg total) by mouth once daily. 90 tablet 2 3/19/2025

## 2025-03-20 NOTE — OPERATIVE REPORT
Pre-op Dx: Aortoiliac / bilateral CFA disease/ Post-op Dx: Same. Procedure: Open Right Iliofemoral- 8Fr sheath Dr. Hoffmann. Access left CFA micropuncture with retrograde angiogram 4Fr at proximal left CFA- Dr. Hoffmann. Aortic angiogram -Dr. Hoffmann- suprarenal/ bifurcation Dr. Hoffmann. [id number] stent graft Aorta- Dr. Hoffmann- subsequent 12x6cm PTA of aortic stent graft. 9x39mm VBX stent graft-Dr. De Jesus. Right iliofemoral endarterectomy/ Right GSV patch- Dr. Hoffmann. Co-surgeons: Tahira/ Liza. Asst: Kenan Garcia. EBL-400cc. 160cc cell saver/ 2L crystalloid. Good dooppler flow right foot/ also flow left DP

## 2025-03-20 NOTE — PROCEDURES
[facility]    PATIENT'S NAME: NIKHIL BOLAND   ATTENDING PHYSICIAN: John Hoffmann M.D.   OPERATING PHYSICIAN: Emmett De Jesus M.D.   PATIENT ACCOUNT#:   [id number]    LOCATION:  [facility]  MEDICAL RECORD #:   [id number]       YOB: 1945  ADMISSION DATE:       03/20/2025      OPERATION DATE:  03/20/2025    CARDIAC PROCEDURE TRANSCRIPTION      PERCUTANEOUS PERIPHERAL INTERVENTION    PREOPERATIVE DIAGNOSIS:    1.   Infrarenal aortic stenosis.  2.   Iliac stenosis.  3.   Stenosis of the right common femoral artery.  POSTOPERATIVE DIAGNOSIS:    PROCEDURE PERFORMED:    1.   Stent graft placement of the abdominal aorta.    2.   Stent graft placement of the right common iliac.  3.   Endarterectomy by Dr. Hoffmann.    OPERATORS:  Emmett De Jesus MD; John Hoffmann MD.    DESCRIPTION OF PROCEDURE:  After informed consent was obtained, the patient was prepped and draped in the usual sterile fashion.  Patient was under general anesthesia.  The patient already had a cutdown in the right common femoral artery by Dr. Hoffmann with placement of an 8-Irish sheath.  A pigtail catheter was advanced to the suprarenal abdominal aorta.  Angiography revealed patent renals.  The patient had calcific haziness in the mid abdominal aorta associated with a small aneurysm formation there.  There is a relative stenosis based on angiography of about 40% to 50%, closer to 60% by CT both in the mid aortic segment as well as the right common iliac segment.    Left common iliac was patent but appeared to have an eccentric stenosis there of 50%.  Internal iliacs appeared to be patent.  External iliacs appeared to be patent.    Heparin was given to maintain ACT over 250 seconds.    A Samaniego wire was placed in the aorta.  An 11 x 59 VBX stent was advanced into the mid abdominal aorta.  This straddled the stenosis.  This was deployed at nominal pressures.  Subsequent angiography revealed good placement of the stent.  However, the  size of the aorta relatively speaking at least proximally was closer to 13 to 14 mm.  Therefore we postdilated the entire segment again with a 12 mm balloon.  On subsequent angiography after the 12 mm balloon,  the stent was well seated.  We did not see any contrast around the stent.  As the area was heavily calcified and plaqued, we did not feel comfortable going with a 14 mm balloon.  Next,  attention was turned to the right common iliac.  There was a stenosis of at least 50%.  Decision was made to intervene so there was good flow into the right common femoral artery.  Therefore, a 9 x 39 mm [place] VBX stent was placed from the ostium of the right common iliac down to the mid common iliac.  This was deployed at nominal pressures of 12 atmospheres.  Subsequent angiography using the 8-Ukrainian sheath revealed good placement of the stent without any dissections.    The case will be completed by Dr. Hoffmann after an endarterectomy of the right common femoral artery.    SUMMARY:  In summary, the patient today underwent placement of a [place] VBX stent graft in the abdominal aorta as well as the right common iliac as described above.    Dictated By Emmett De Jesus M.D.  d: 03/20/2025 09:15:26  t: 03/20/2025 10:41:45  [facility] [id number]  AR/

## 2025-03-20 NOTE — OPERATIVE REPORT
[facility]    PATIENT'S NAME: NIKHIL BOLAND   ATTENDING PHYSICIAN: John Hoffmann M.D.   OPERATING PHYSICIAN: John Hoffmann M.D.   PATIENT ACCOUNT#:   [id number]    LOCATION:  [address] [place] [address]  MEDICAL RECORD #:   [id number]       YOB: 1945  ADMISSION DATE:       03/20/2025      OPERATION DATE:  03/20/2025    OPERATIVE REPORT    PREOPERATIVE DIAGNOSIS:  Rest pain and ischemic ulceration, right foot, with aortoiliac and bilateral common femoral artery occlusive disease.  POSTOPERATIVE DIAGNOSIS:  Rest pain and ischemic ulceration, right foot, with aortoiliac and bilateral common femoral artery occlusive disease.  PROCEDURE:    1.   Open right iliofemoral, profunda femoral, superficial femoral artery exposure by Dr. Hoffmann.  2.   Access right common femoral artery micropuncture technique proximally by Dr. Hoffmann with placement eventually of an 8-Belarusian sheath and a pigtail catheter in the suprarenal aorta.  3.   Access left great proximal common distal external iliac artery with micropuncture technique and a retrograde angiogram.  4.   Pigtail catheter angiogram of suprarenal aorta of the aortoiliac segments with an AP and then an oblique view and then, with the catheter brought down to the aortic bifurcation, repeat angiogram was done of the right common iliac artery and left common iliac artery.  5.   Placement of a VBX stent graft of 11 x 59 mm of the infrarenal aorta, ending about 1.5 cm above the bifurcation with balloon angioplasty, followed up with another 12 mm x 6 cm balloon angioplasty to splay the graft up against the wall with repeat angiogram showing well-adherent to the area of the wall but still some perfusion through the lumbar arteries on the right side by Dr. Hoffmann.  6.   Placement of a VBX stent graft, 9 x 39 mm, of the right common iliac artery just at the origin, balloon to nominal pressures by Dr. De Jesus.    7.   Retrograde angiogram from the right femoral artery sheath by  Dr. De Jesus, showing good adaption of the common iliac artery stent graft.  8.   Right iliofemoral, profunda femoral, superficial femoral endarterectomy with right great saphenous vein patch by Dr. Hoffmann.    CO-SURGEONS:  John Hoffmann MD; Emmett De Jesus MD.    ASSISTANT:  VIKTORIYA Minaya.    INDICATIONS:  This is an 80-year-old white female who has had previous cardiac bypass surgery, previous PTCA intervention by Dr. Gomez who has a diagnosis of atrial flutter with controlled rate with rest pain, gangrene of the right foot.  She had a CT angiogram and ultrasound showing some dampening away from going into the femoral artery bilaterally, but severe blockage of the infrarenal aorta of at least 60% and at least a 60% to 70% blockage of the right common iliac artery.  There was about a 70% to 80% blockage of the left common iliac artery.  Both femoral arteries were severely diseased.  The left was more distal, but in the operating room ultrasound showed it was significant, at least 50% to 80% on the left side with 80% plus on the right side.  Both superficial femoral artery, popliteal arteries, tibial vessels were open going down the legs.    She was recommended at this time, due to severe ischemia of the right leg and clearance by Cardiology by Dr. Gomez, to do a right iliofemoral, profunda femoral endarterectomy and vein patch and initially attempt to do a stent graft placement of the aorta to treat the stenosis in that area as well as both common iliac arteries and try to avoid endarterectomy of the left side that would add more time to the surgery.  It did not need really to be perfused.  The risks of death, myocardial infarction, bleeding, infection, limb loss, graft thrombosis, future limb loss, future graft thrombosis, renal failure, cardiac arrest, wound infection, paresthesias, limb loss with a patent repair all were explained.  The option of doing an open repair for an aortobifemoral was explained.  The  option of trying to do this percutaneously was also explained.  The patient understood and agreed.  All questions were answered.  Her  was present when we did this discussion.    OPERATIVE TECHNIQUE:  Patient was placed supine on the procedure table.  Underwent general anesthesia in the hybrid room.  Received preoperative antibiotics.  Had a Franks catheter placed by Surgery, arterial lines and IV by Anesthesia.  The abdomen from the umbilicus down was prepped to the knees bilaterally.  Ultrasound showed a good saphenous vein on the right side.  There was significant plaque disease of the right common femoral artery and then there was more plaque disease that was appreciated on the angiogram and CT scan on the left side in the mid common femoral artery.    Ioban and Vi-Drape were used to cover the operative field.  After a time-out was done, incision was made curvilinear in the right groin, staying above the inguinal crease.  I was able to expose the external iliac artery, the profunda femoral artery, superficial femoral artery.  A separate incision was made in the thigh, and the saphenous vein was unroofed.  It appeared to be good quality, slightly dilated, but appeared to be of excellent quality.  Branches were ligated with 4-0 Ethibond ties and hemoclips, and divided.  The vein was eventually harvested at the end of the procedure, being harvested with 3-0 Ethibond on a needle proximally and distally and prepared for vein patch.  The wounds in the thigh were eventually closed in multiple layers with 2-0 Vicryl at the end of the procedure.    Once this artery was exposed, the patient had been fully anticoagulated, on the right femoral artery and it was accessed with a micropuncture technique by myself with placement of a micropuncture wire, a 4-Occitan sheath, a J-wire, an 8-Occitan sheath, and another pigtail catheter.  An angiogram was performed showing severe disease of the aorta with a small outpouching of  the aorta in the midportion of the infrarenal area.  There was significant disease of both iliac arteries; the left was worse than the right.      The patient then had a Samaniego wire placed up through the 8-Panamanian sheath and, after images were done with magnification, the pigtail catheter was removed with advancement of an 11 x 59 mm VBX stent graft that was expanded to nominal pressures and then repeated with a balloon of 12 mm x 6 cm, slightly above nominal pressures.  Repeat angiograms showed this was well up against the wall.  Though there was still some plaque disease, it opened up nicely.  This was done by myself.  The patient then had the right iliac artery assessed with oblique and AP views and, with the Samaniego wire still in there, Dr. De Jesus advanced a 9 x 39 mm VBX stent graft to the common iliac artery and expanded it to nominal pressures.  It was widely open with a retrograde angiogram.  At this time during the procedure, since there was a little bit more disease in the femoral artery on the left side and since she had no symptoms on the left side and was worried about closure of the left femoral artery without having to fix the left femoral artery due to plaque disease in the midportion distally, it was decided not to place a stent graft in the left side at this time and just add that in the future if necessary.    The patient was still fully anticoagulated.  ACT was monitored to make sure it was over 250 seconds throughout the procedure.  A profunda clamp was placed upon the profunda femoral artery which had been dissected out with the branches on top of this, ligated with 4-0 Ethibond ties and hemoclips and divided and the superficial femoral artery.  The vessel branches off the iliofemoral arterial segment were controlled with vessel loops, and a Vazquez clamp was placed upon the external iliac artery proximally.  The sheath and wires had been withdrawn from the artery.    The access site was then  used as the source of the Hurley scissors, was opened distally and slightly proximally.  A separate incision was made distally on the superficial femoral artery and extended proximally through severely calcified, almost completely occlusive lesion in mid portion of the femoral artery.    Endarterectomy was performed, and the plaque was severely calcified adherent to the wall.  It was able surprisingly to be teased away from the native artery without any problems.    Tacking was done proximally and distally with 6-0 and 5-0 Prolene suture.  The artery appeared to be widely open.  The patient then had the vein patch performed with 6-0 Prolene suture.  Multiple sutures were used for the hemostasis of the patch on the artery.    Just before this was completed, all vessels were flushed.  The area was irrigated with heparinized saline solution, suctioned clean, and then backbleeding was allowed to make sure all the air was flushed out of the superficial femoral artery proximally.  Flow was established from the external iliac artery down to the femoral artery through its branches, and then down the profunda femoral artery and then down the leg.    Hemostasis was obtained with 6-0 Prolene suture, Gelfoam and thrombin, Nu-Knit, Surgicel, and Floseal.  The patient also had hemoclips used for hemostasis as well as Bovie coagulation.  Protamine was given to reverse the effect of the heparin, and ACT was back down to baseline at the end of procedure.  Patient was in atrial flutter throughout the procedure with the blood pressure relatively normal.  The patient's blood loss was about 400 mL.  She received 160 mL of Cell Saver, 2 L of crystalloid.  Urine output was adequate.  The patient's total contrast used was 60 mL, and she had a fluoro time of 8 minutes with a DAP of [phone].  Once hemostasis was obtained in the groin, the wounds were closed in multiple layers with 2-0 Vicryl.  The thigh incision was closed with skin staples.   The groin was closed with 3-0 Vicryl subcuticular.  The incision appeared to stop above the inguinal crease.  Dermabond was placed on the wound above with gauze and Tegaderm as well as the thigh incision.  The area was anesthetized with 30 mL of 0.25% Marcaine plain.  Doppler flow appreciated in the foot.      FINAL DIAGNOSIS:  Severe rest pain with ulcerations of the right foot, treated with aortic stent graft, right common iliac artery stent graft; right iliofemoral, profunda femoral, superficial femoral endarterectomy with right great saphenous vein patch; with aortic angiogram at the suprarenal area and aortic angiogram at the bifurcation.      Dictated By John Hoffmann M.D.  d: 03/20/2025 11:29:23  t: 03/20/2025 13:40:07  [facility] [id number]  JJW/    cc: BROOKE Cherry M.D. Osama Qaqi, MD

## 2025-03-20 NOTE — CONSULTS
[facility]  Report of Consultation    Kera Reed Patient Status:  Inpatient    1945 MRN [id number]   Location [facility] 4SW-A Attending John Hoffmann MD   Hosp Day # 0 PCP Daya Chandler MD     Reason for Consultation:  PVD, persistent afib, post op management     History of Present Illness:  Kera Reed is a a(n) 80 year old female history of CAD s/p cabg , PCI LM/LAD 2024,  ICM with recovered ef 50-55%,  persistent atrial fibrillation, severe TR, htn, hx prior pe/dvt with factor v leiden/APLS historically on warfarin, hypothyroid, peripheral vascular disease with severe calcified stenosis of bilateral common femoral artery, severe tricuspid regurgitation who presents electively for femoral endarterectomy, aortic and right iliac stent graft.     History:  Past Medical History:    Allergic rhinitis    Congestive heart disease (HCC)    Coronary atherosclerosis    Deep vein thrombosis (HCC)    Disorder of thyroid    Factor V deficiency (HCC)    High blood pressure    High cholesterol    History of blood transfusion    Hypothyroidism    Pulmonary embolism (HCC)    Shortness of breath     Past Surgical History:   Procedure Laterality Date    Bypass surgery      cardiac    Cabg      [facility]on[facility]    [facility]onoscopy      Hysterectomy      Knee replacement surgery      Total knee replacement       Family History   Family history unknown: Yes      reports that she quit smoking about 25 years ago. Her smoking use included cigarettes. She started smoking about 65 years ago. She has a 40 pack-year smoking history. She has never used smokeless tobacco. She reports that she does not currently use alcohol. She reports that she does not use drugs.    Allergies:  Allergies[1]    Medications:    Current Facility-Administered Medications:     lactated ringers infusion, , Intravenous, Continuous    enoxaparin (Lovenox) 40 MG/0.4ML SUBQ injection 40 mg, 40 mg, Subcutaneous,  Nightly    acetaminophen (Tylenol) tab 650 mg, 650 mg, Oral, Q4H PRN **OR** HYDROcodone-acetaminophen (Norco) 5-325 MG per tab 1 tablet, 1 tablet, Oral, Q4H PRN **OR** HYDROcodone-acetaminophen (Norco) 5-325 MG per tab 2 tablet, 2 tablet, Oral, Q4H PRN    morphINE PF 2 MG/ML injection 1 mg, 1 mg, Intravenous, Q2H PRN **OR** morphINE PF 2 MG/ML injection 2 mg, 2 mg, Intravenous, Q2H PRN    aspirin DR tab 81 mg, 81 mg, Oral, Daily    clopidogrel (Plavix) tab 75 mg, 75 mg, Oral, Daily    ceFAZolin (Ancef) 2g in 10mL IV syringe premix, 2 g, Intravenous, Q8H    pantoprazole (Protonix) 40 mg in sodium chloride 0.9% PF 10 mL IV push, 40 mg, Intravenous, QAM AC **OR** pantoprazole (Protonix) DR tab 40 mg, 40 mg, Oral, QAM AC    [START ON 3/21/2025] metoprolol succinate ER (Toprol XL) 24 hr tab 50 mg, 50 mg, Oral, Daily Beta Blocker    glucose (Dex4) 15 GM/59ML oral liquid 15 g, 15 g, Oral, Q15 Min PRN **OR** glucose (Glutose) 40% oral gel 15 g, 15 g, Oral, Q15 Min PRN **OR** glucose-vitamin C (Dex-4) chewable tab 4 tablet, 4 tablet, Oral, Q15 Min PRN **OR** dextrose 50% injection 50 mL, 50 mL, Intravenous, Q15 Min PRN **OR** glucose (Dex4) 15 GM/59ML oral liquid 30 g, 30 g, Oral, Q15 Min PRN **OR** glucose (Glutose) 40% oral gel 30 g, 30 g, Oral, Q15 Min PRN **OR** glucose-vitamin C (Dex-4) chewable tab 8 tablet, 8 tablet, Oral, Q15 Min PRN    insulin aspart (NovoLOG) 100 Units/mL FlexPen 1-10 Units, 1-10 Units, Subcutaneous, TID AC and HS    ALPRAZolam (Xanax) tab 0.25 mg, 0.25 mg, Oral, Daily PRN    [START ON 3/21/2025] amiodarone (Pacerone) tab 200 mg, 200 mg, Oral, Daily    bumetanide (Bumex) tab 1 mg, 1 mg, Oral, BID (Diuretic)    busPIRone (Buspar) tab 10 mg, 10 mg, Oral, BID    [START ON 3/21/2025] levothyroxine (Synthroid) tab 75 mcg, 75 mcg, Oral, Before breakfast    Review of Systems:  All systems were reviewed and are negative except as described above in HPI.    Physical Exam:  Blood pressure (!) 109/92, pulse  98, temperature 97 °F (36.1 °C), temperature source Temporal, resp. rate 12, height 5' 2\" (1.575 m), weight 158 lb 4.8 oz (71.8 kg), SpO2 94%, not currently breastfeeding.  Temp (24hrs), Av.6 °F (36.4 °C), Min:97 °F (36.1 °C), Max:98.2 °F (36.8 °C)    Wt Readings from Last 3 Encounters:   25 158 lb 4.8 oz (71.8 kg)   25 159 lb 9.8 oz (72.4 kg)   01/10/25 151 lb (68.5 kg)       Telemetry:   General: Alert and oriented in no apparent distress.  HEENT: No focal deficits.  Neck: No JVD, carotids 2+ no bruits.  Cardiac: irregular  S1, S2 normal, no murmur, rub or gallop.  Lungs: clear anteriorly   Abdomen: Soft, non-tender.   Extremities: Without clubbing, cyanosis or edema. Good right distal peripheral pulse, faint palpable on left . Incision dressing intact  Neurologic: Alert and oriented, normal affect.  Skin: Warm and dry.     Laboratories and Data:  Diagnostics:  EKG: atrial flutter with variable conduction     Echo: 25  1. Left ventricle: The cavity size was normal. Wall thickness was normal.      Systolic function was at the lower limits of normal. The estimated      ejection fraction was 50-55%. Unable to assess LV diastolic function due      to heart rhythm.   2. Right ventricle: The cavity size was moderately to markedly increased.      Systolic function was reduced.   3. Left atrium: The left atrial volume was moderately increased.   4. Right atrium: The atrium was massively dilated.   5. Ascending aorta: The ascending aorta was 3.7cm diameter.   6. Mitral valve: There was mild regurgitation.   7. Tricuspid valve: There was malcoaptation of the valve leaflets. There was      severe regurgitation.   Impressions:  No previous study from [facility] was   available for comparison       Labs:   Lab Results   Component Value Date    WBC 6.4 2025    RBC 3.47 2025    HGB 11.2 2025    HCT 33.6 2025    MCV 96.8 2025    MCH 32.3 2025    [facility] 33.3  03/20/2025    RDW 19.0 03/20/2025    .0 03/20/2025     Lab Results   Component Value Date    INR 1.12 03/20/2025    INR 1.43 (H) 03/13/2025    INR 1.40 (H) 03/04/2025       Assessment/Plan:  Patient Active Problem List   Diagnosis    Adrenal cortical adenoma    Atopic rhinitis    Other mixed anxiety disorders    Atherosclerosis of coronary artery    Diabetes mellitus (HCC)    Gastroesophageal reflux disease    Steatosis of liver    Polyp of gallbladder    Hyperlipidemia    Hypertension    Hypothyroidism    Insomnia due to medical condition    Irritable bowel syndrome    Menopause present    Multiple pulmonary nodules    Adiposity    Vitamin D deficiency    Prediabetes    Greater trochanteric bursitis of right hip    Hip strain, left, initial encounter    Acute right-sided low back pain with right-sided sciatica    Atherosclerosis of aorta    DDD (degenerative disc disease), cervical    Neck pain    Acute pain of right shoulder    Chronic right-sided low back pain without sciatica    Diarrhea, unspecified type    Hospitalization within last 30 days    Precordial pain    Tremor    Stage 4 chronic kidney disease (HCC)    Acute recurrent maxillary sinusitis    Dizziness    Romberg's test positive    BPPV (benign paroxysmal positional vertigo), right    Mixed conductive and sensorineural hearing loss of left ear with restricted hearing of right ear    Depression, major, recurrent, in partial remission    Coronary atherosclerosis of native coronary artery    Alcohol dependence, continuous (HCC)    Episodic benzodiazepine dependence (HCC)    Supratherapeutic INR    Longstanding persistent atrial fibrillation (HCC)    A-fib (HCC)    Aortoiliac occlusive disease (HCC)       Infrarenal aortic aneurysm, pvd with severe common femoral artery stenosis s/p right femoral endarterectomy, stent graft placement abdominal aorta, right common ilac ([place]/Liza)- POD 0- hemodynamically stable   Persistent afib, currently in  aflutter- rates controlled on bb/amio,  historically on warfarin.   Cad hx remote cabg 2012, pci left main/lad 11/2024  Clinically quiescent  Plavix, bb. Currently on asa which will be stopped once INR is therapeutic.   ICM with recovered ef 50-55% (from 35-40% in past)  Compensated on exam.   Continue GDMT with jardiance, bb/arb/bumex  Htn- controlled  HL- pt has deferred statin per office notes      Plan:    Continue baseline cv regimen  Ok to resume warfarin tonight per Dr Hoffmann (low dose 2.5 mg). Will stop asa once INR therapeutic  Dc ivf once taking po adequately      Veronica Hart NP  3/20/2025  4:09 PM  [phone]      Patient seen and examined    Case discussed with patient and Dr. Hoffmann.  She has a history of ischemic cardiomyopathy with severe tricuspid insufficiency and persistent atrial fibrillation/flutter.  Today, patient underwent stenting of her abdominal aorta as well as her right common iliac.  Dr. Hoffmann today right common femoral artery endarterectomy.  She is awake and alert.  She appears well.  Foot is warm.  She does not appear to be volume overloaded.    We will try and restart her usual medication regimen and resume warfarin tonight.  We will need to reassess volume status tomorrow.    Emmett eD Jesus MD FAC  C5       [1] No Known Allergies

## 2025-03-20 NOTE — ANESTHESIA PROCEDURE NOTES
Airway  Date/Time: 3/20/2025 7:26 AM  Urgency: elective      General Information and Staff    Patient location during procedure: OR  Anesthesiologist: Jesus Mayorga MD  Performed: anesthesiologist   Performed by: Jesus Mayorga MD  Authorized by: Jesus Mayorga MD      Indications and Patient Condition  Indications for airway management: anesthesia  Sedation level: deep  Preoxygenated: yes  Patient position: sniffing  Mask difficulty assessment: 1 - vent by mask    Final Airway Details  Final airway type: endotracheal airway      Successful airway: ETT  Cuffed: yes   Successful intubation technique: direct laryngoscopy  Endotracheal tube insertion site: oral  Blade: Amadou  Blade size: #3  ETT size (mm): 7.0    Cormack-Lehane Classification: grade I - full view of glottis  Placement verified by: capnometry   Measured from: lips  ETT to lips (cm): 21  Number of attempts at approach: 1

## 2025-03-20 NOTE — ANESTHESIA POSTPROCEDURE EVALUATION
[facility]    Kera STALLWORTH Voyt Patient Status:  Inpatient   Age/Gender 80 year old female MRN [id number]   Location [facility] 4SW-A Attending John Hoffmann MD   Hosp Day # 0 PCP Daya Chandler MD       Anesthesia Post-op Note    RIGHT ILIOFEMORAL ENDARTERECTOMY WITH VEIN PATCH, AORTIC STENT GRAFT, RIGHT ILIAC STENT GRAFT, see cath lab notes for further details    Procedure Summary       Date: 03/20/25 Room / Location:  CVOR 03 HYBRID /  CVOR    Anesthesia Start: 0716 Anesthesia Stop: 1205    Procedure: RIGHT ILIOFEMORAL ENDARTERECTOMY WITH VEIN PATCH, AORTIC STENT GRAFT, RIGHT ILIAC STENT GRAFT, see cath lab notes for further details Diagnosis: (aortoiliac disease, right foot ulcer)    Surgeons: John Hoffmann MD Anesthesiologist: Jesus Mayorga MD    Anesthesia Type: general ASA Status: 4            Anesthesia Type: No value filed.    Vitals Value Taken Time   /78 03/20/25 1208   Temp 97 °F (36.1 °C) 03/20/25 1159   Pulse 112 03/20/25 1208   Resp 19 03/20/25 1207   SpO2 91 % 03/20/25 1208   Vitals shown include unfiled device data.        Patient Location: ICU    Anesthesia Type: general    Airway Patency: patent and extubated    Postop Pain Control: adequate    Nausea/Vomiting: none    Cardiopulmonary/Hydration status: stable euvolemic    Complications: no apparent anesthesia related complications    Postop vital signs: stable    Dental Exam: Unchanged from Preop    Patient to be discharged from PACU when criteria met.

## 2025-03-20 NOTE — SPIRITUAL CARE NOTE
Spiritual Care Visit Note    Patient Name: Kera Reed Date of Spiritual Care Visit: 25   : 1945 Primary Dx: <principal problem not specified>       Referred By: Referral From: Nurse    Spiritual Care Taxonomy:    Intended Effects: Demonstrate caring and concern    Methods: Collaborate with care team member, Explore spiritual/Anabaptist beliefs    Interventions: Active listening, Ask guided questions, Explain  role, Provide hospitality    Visit Type/Summary:     - Spiritual Care: Consulted with RN prior to visit. Offered empathic listening and emotional support. Patient and family expressed appreciation for  visit. Provided information regarding how to contact Spiritual Care and left a Spiritual Care information card.  remains available as needed for follow up.  - PoA: Other: Provided education regarding PoA for Healthcare. Left PoA information with patient for review.   assessed for other spiritual needs.  Left PoA information and Spiritual Care contact information.  remains available for follow up. Spoke with the nurse prior to the  visit, upon entering the patient's room I observed the patient sitting up in bed spouse is at bedside. The patient and spouse welcomed me and asked me to have a sit. The patient said that she has been in surgery for 4 hours and is glade to be alive. The patient stated that she attends [employer] with her . The patient also said that she has been  for 56 years and they have three adult children and five grand-children. provided spiritual and emotional support to the patient and spouse.  Patient and spouse expressed their gratitude for the  visit.     Spiritual Care support can be requested via an Epic consult. For urgent/immediate needs, please contact the On Call  at: Edward: ext [phone]    Chaplain Resident Christy Guy MA

## 2025-03-20 NOTE — ANESTHESIA PROCEDURE NOTES
Arterial Line    Date/Time: 3/20/2025 7:26 AM    Performed by: Jesus Mayorga MD  Authorized by: Jesus Mayorga MD    General Information and Staff    Procedure Start:  3/20/2025 7:26 AM  Procedure End:  3/20/2025 7:30 AM  Anesthesiologist:  Jesus Mayorga MD  Performed By:  Anesthesiologist  Patient Location:  OR  Indication: continuous blood pressure monitoring and blood sampling needed    Site Identification: real time ultrasound guided    Preanesthetic Checklist: 2 patient identifiers, IV checked, risks and benefits discussed, monitors and equipment checked, pre-op evaluation, timeout performed, anesthesia consent and sterile technique used    Procedure Details    Catheter Size:  20 G  Catheter Length:  1 and 3/4 inch  Catheter Type:  Arrow  Seldinger Technique?: Yes    Laterality:  Left  Site:  Radial artery  Site Prep: chlorhexidine    Line Secured:  Tape and Tegaderm    Assessment    Events: patient tolerated procedure well with no complications      Medications  3/20/2025 7:26 AM      Additional Comments

## 2025-03-21 ENCOUNTER — APPOINTMENT (OUTPATIENT)
Dept: GENERAL RADIOLOGY | Facility: HOSPITAL | Age: 80
End: 2025-03-21
Attending: NURSE PRACTITIONER
Payer: MEDICARE

## 2025-03-21 ENCOUNTER — APPOINTMENT (OUTPATIENT)
Dept: GENERAL RADIOLOGY | Facility: HOSPITAL | Age: 80
DRG: 269 | End: 2025-03-21
Attending: NURSE PRACTITIONER
Payer: MEDICARE

## 2025-03-21 LAB
ANION GAP SERPL CALC-SCNC: 5 MMOL/L (ref 0–18)
ATRIAL RATE: 227 BPM
BUN BLD-MCNC: 18 MG/DL (ref 9–23)
CALCIUM BLD-MCNC: 8.6 MG/DL (ref 8.7–10.6)
CHLORIDE SERPL-SCNC: 107 MMOL/L (ref 98–112)
CO2 SERPL-SCNC: 27 MMOL/L (ref 21–32)
CREAT BLD-MCNC: 0.91 MG/DL
EGFRCR SERPLBLD CKD-EPI 2021: 63 ML/MIN/1.73M2 (ref 60–?)
ERYTHROCYTE [DISTWIDTH] IN BLOOD BY AUTOMATED COUNT: 18.9 %
GLUCOSE BLD-MCNC: 109 MG/DL (ref 70–99)
GLUCOSE BLD-MCNC: 110 MG/DL (ref 70–99)
GLUCOSE BLD-MCNC: 123 MG/DL (ref 70–99)
GLUCOSE BLD-MCNC: 133 MG/DL (ref 70–99)
GLUCOSE BLD-MCNC: 139 MG/DL (ref 70–99)
HCT VFR BLD AUTO: 28.8 %
HGB BLD-MCNC: 9.7 G/DL
INR BLD: 1.19 (ref 0.8–1.2)
INR BLD: 1.29 (ref 0.8–1.2)
MCH RBC QN AUTO: 32.4 PG (ref 26–34)
MCHC RBC AUTO-ENTMCNC: 33.7 G/DL (ref 31–37)
MCV RBC AUTO: 96.3 FL
OSMOLALITY SERPL CALC.SUM OF ELEC: 290 MOSM/KG (ref 275–295)
P AXIS: 251 DEGREES
PLATELET # BLD AUTO: 199 10(3)UL (ref 150–450)
PLATELETS.RETICULATED NFR BLD AUTO: 3 % (ref 0–7)
POTASSIUM SERPL-SCNC: 4.4 MMOL/L (ref 3.5–5.1)
POTASSIUM SERPL-SCNC: 4.4 MMOL/L (ref 3.5–5.1)
PROTHROMBIN TIME: 15.2 SECONDS (ref 11.6–14.8)
PROTHROMBIN TIME: 16.2 SECONDS (ref 11.6–14.8)
Q-T INTERVAL: 424 MS
QRS DURATION: 180 MS
QTC CALCULATION (BEZET): 549 MS
R AXIS: -31 DEGREES
RBC # BLD AUTO: 2.99 X10(6)UL
SODIUM SERPL-SCNC: 139 MMOL/L (ref 136–145)
T AXIS: -12 DEGREES
VENTRICULAR RATE: 101 BPM
WBC # BLD AUTO: 7.6 X10(3) UL (ref 4–11)

## 2025-03-21 PROCEDURE — 71045 X-RAY EXAM CHEST 1 VIEW: CPT | Performed by: NURSE PRACTITIONER

## 2025-03-21 PROCEDURE — 99232 SBSQ HOSP IP/OBS MODERATE 35: CPT | Performed by: HOSPITALIST

## 2025-03-21 RX ORDER — WARFARIN SODIUM 2 MG/1
2 TABLET ORAL NIGHTLY
Status: DISCONTINUED | OUTPATIENT
Start: 2025-03-21 | End: 2025-03-21

## 2025-03-21 RX ORDER — WARFARIN SODIUM 2.5 MG/1
2.5 TABLET ORAL
Status: COMPLETED | OUTPATIENT
Start: 2025-03-21 | End: 2025-03-21

## 2025-03-21 NOTE — PLAN OF CARE
Pt alert, see flow sheets. Able to turn in bed. Post Op day 1, right groin is soft with no hematoma. Pain being managed with norco. Franks and art line present. BP dropped below 90, fluid bolus given with improvement no other interventions.

## 2025-03-21 NOTE — PROGRESS NOTES
No complaints  except incisional pain. Moving all 4 extremities. Repair patent. Wounds clean/ dry- will continue with Coumadin

## 2025-03-21 NOTE — PROGRESS NOTES
Progress Note  Kera Reed Patient Status:  Inpatient    1945 MRN [id number]   Location [facility] 4SW-A Attending John Hoffmann MD   Hosp Day # 1 PCP Daya Chandler MD     Subjective:  Pt c/o R groin incisional pain; reports relief with pain medication. Otherwise doing well without complaint. Denies SOB, orthopnea    Objective:  BP 97/83 (BP Location: Left arm)   Pulse 110   Temp 98 °F (36.7 °C) (Oral)   Resp 17   Ht 5' 2\" (1.575 m)   Wt 164 lb 7.4 oz (74.6 kg)   LMP  (LMP Unknown)   SpO2 93%   BMI 30.08 kg/m²     Telemetry: afib 90's    Intake/Output:    Intake/Output Summary (Last 24 hours) at 3/21/2025 1117  Last data filed at 3/21/2025 0812  Gross per 24 hour   Intake 4226 ml   Output 1000 ml   Net 3226 ml       Last 3 Weights   25 0600 164 lb 7.4 oz (74.6 kg)   25 1200 160 lb 11.5 oz (72.9 kg)   25 0608 158 lb 4.8 oz (71.8 kg)   25 1442 155 lb (70.3 kg)   25 0508 159 lb 9.8 oz (72.4 kg)   25 0500 162 lb 11.2 oz (73.8 kg)   25 1227 158 lb 4.6 oz (71.8 kg)   25 1025 158 lb 4.6 oz (71.8 kg)   25 1000 150 lb (68 kg)   01/10/25 1419 151 lb (68.5 kg)       Labs:  Recent Labs   Lab 25  1227 25  0500   * 109*   BUN 24* 18   CREATSERUM 0.99 0.91   EGFRCR 57* 63   CA 8.6* 8.6*    139   K 3.7 4.4  4.4    107   CO2 24.0 27.0     Recent Labs   Lab 25  1227 25  0500   RBC 3.47* 2.99*   HGB 11.2* 9.7*   HCT 33.6* 28.8*   MCV 96.8 96.3   MCH 32.3 32.4   MCHC 33.3 33.7   RDW 19.0 18.9   WBC 6.4 7.6   .0 199.0         No results for input(s): \"TROP\", \"TROPHS\", \"CK\" in the last 168 hours.    Diagnostics:  XR CHEST AP PORTABLE  (CPT=71045)    Result Date: 3/21/2025  CONCLUSION:  Stable cardiomegaly.  No acute cardiopulmonary process noted.  Lungs are clear.   LOCATION:  [facility]      Dictated by (CST): Ethan Oscar DO on 3/21/2025 at 9:35 AM     Finalized by (CST): Ethan Oscar DO on 3/21/2025 at  9:36 AM       Review of Systems   Cardiovascular:  Negative for chest pain, dyspnea on exertion, leg swelling and orthopnea.   Respiratory:  Negative for cough and shortness of breath.        Physical Exam:    Gen: alert, oriented x 3, NAD  Heent: pupils equal, reactive. Mucous membranes moist.   Neck: no jvd  Cardiac: irregular rate and rhythm, normal S1,S2, no murmur, clicks, rub or gallop  Lungs: CTA anterior  Abd: soft, NT/ND +bs  Ext: trace edema, + palpable pedal pulse  Skin: Warm, dry, R groin w/dressing small amt of serosanguinouis; ecchymotic  Neuro: No focal deficits      Medications:     enoxaparin  40 mg Subcutaneous Nightly    aspirin  81 mg Oral Daily    clopidogrel  75 mg Oral Daily    pantoprazole  40 mg Intravenous QAM AC    Or    pantoprazole  40 mg Oral QAM AC    metoprolol succinate ER  50 mg Oral Daily Beta Blocker    insulin aspart  1-10 Units Subcutaneous TID AC and HS    amiodarone  200 mg Oral Daily    bumetanide  1 mg Oral BID (Diuretic)    busPIRone  10 mg Oral BID    levothyroxine  75 mcg Oral Before breakfast    warfarin  2.5 mg Oral Nightly    empagliflozin  10 mg Oral Daily           Assessment:  Severe  s/p R IlioFemoral Endarterectomy, Infrarenal Aortic Stent Graft, R Common Iliac Stent Graft  Pt is HD stable - POD 1  Vascular surgery - Dr Hoffmann following  On asa, plavix, warfarin  Persistent Atrial Fib/Flutter  Hx DCCV 12/2024 w/return of AF within 3 days  Rates controlled  On amiodarone, BB  A/C with warfarin - 2.5mg po nightly   INR subtherapeutic this am   CAD s/p CABG 2012, Subsequent PCI Distal LM/LAD 11/2024  Denies anginal symptoms  Chronic HFimpEF, ICM, LVEF 50-55% (prev 35-40%)  Appears compensated on exam  GDMT - hx on BB, losartan, jardiance, bumex  Severe TR  SH Eval 1/2025 - plans to follow up in 6 months  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    DELFINO Rivas  3/21/2025  11:17 AM  [phone] [facility]  [phone] [facility]        Patient seen and examined independently.  Note reviewed and labs reviewed. Agree with above assessment and plan.    PAD  Intermittent Claudication, RF Class III   s/p R IlioFemoral Endarterectomy, Infrarenal Aortic Stent Graft, R Common Iliac Stent Graft  Severe TR  Chronic Persistent A Fib    Recommendations  Vascular surgery to manage  Continue dapt for now, anticoagulation, consider stopping aspirin in 1 month  Rhythm management per EP, as outpatient    Deena Gomez MD  Cardiologist  [facility]  3/21/2025 3:26 PM      Note to the patient: The 21st Century Cures Act makes medical notes like these available to patients in the interest of transparency. However, be advised that this is a medical document. It is intended as peer to peer communication. It is written in medical language and may contain abbreviations or verbiage that are unfamiliar. It may appear blunt or direct. Medical documents are intended to carry relevant information, facts as evident, and clinical opinion of the practitioner.     Disclaimer: Components of this note were documented using voice recognition system and are subject to errors not corrected at proofreading. Contact the author of this note for any clarifications.

## 2025-03-21 NOTE — PHYSICAL THERAPY NOTE
PHYSICAL THERAPY EVALUATION - INPATIENT     Room Number: 455/455-A  Evaluation Date: 3/21/2025  Type of Evaluation: Initial  Physician Order: PT Eval and Treat    Presenting Problem: R iliofemoral endarterectomy 3/20  Co-Morbidities : CAD, CABG, blood clotting disorder, h/o DVT/PE, AF, HF, HTN, HLD, anxiety, R foot gangrene  Reason for Therapy: Mobility Dysfunction and Discharge Planning    PHYSICAL THERAPY ASSESSMENT   Patient is a 80 year old female admitted 3/20/2025 for R iliofemoral endarterectomy.  Prior to admission, patient's baseline is mod I for gait w/o or w/ device, I w/ adl's.  Patient is currently functioning below baseline with bed mobility, transfers, and gait.  Patient is requiring minimal assist as a result of the following impairments: decreased functional strength, pain, impaired standing balance, and decreased muscular endurance.  Physical Therapy will continue to follow for duration of hospitalization.    Patient will benefit from continued skilled PT Services at discharge to promote prior level of function and safety with additional support and return home with home health PT.    PLAN DURING HOSPITALIZATION  Nursing Mobility Recommendation : 1 Assist  PT Device Recommendation: Rolling walker  PT Treatment Plan: Bed mobility, Patient education, Family education, Gait training, Transfer training, Balance training  Rehab Potential : Good  Frequency (Obs): 3-5x/week     CURRENT GOALS    Goal #1 Patient is able to demonstrate supine - sit EOB @ level: modified independent     Goal #2 Patient is able to demonstrate transfers EOB to/from BSC at assistance level: supervision     Goal #3 Patient is able to ambulate 50 feet with assist device: walker - rolling at assistance level: supervision     Goal #4    Goal #5    Goal #6    Goal Comments: Goals established on 3/21/2025      PHYSICAL THERAPY MEDICAL/SOCIAL HISTORY  History related to current admission: Patient is a 80 year old female admitted on  3/20/2025 from home for planned R iliofemoral endarterectomy.      HOME SITUATION  Type of Home: House  Home Layout: Two level, Able to live on main level, Other (Comment) ([place])  Stairs to Enter : 1        Stairs to Bedroom: 0         Lives With: Spouse (needs knee surgery)    Drives: No   Patient Regularly Uses: Cane, Rolling walker (no device used at home; uses cane in community for short distances and RW for longer ones)      Prior Level of [place]: Pt typically is independent w/ adl's.  Uses a rolling walker for longer community distances, cane for shorter community distances and no device w/I her house.  Does not need to do stairs when she returns home.    SUBJECTIVE  \"Can you ask the nurse for pain meds?\"  After moving back to the bed.    OBJECTIVE  Precautions: Other (Comment) (R groin incision, R upper arm hematoma)  Fall Risk: High fall risk    WEIGHT BEARING RESTRICTION     PAIN ASSESSMENT  Ratin  Location: R le  Management Techniques: Activity promotion, Repositioning    COGNITION  Overall Cognitive Status:  WFL - within functional limits    RANGE OF MOTION AND STRENGTH ASSESSMENT  Upper extremity ROM and strength -see OT evaluation    Lower extremity ROM is within functional limits - except R hip - not tested    Lower extremity strength - not formally tested, but functional for gait/standing    BALANCE  Static Sitting: Fair  Dynamic Sitting: Fair -  Static Standing: Poor +  Dynamic Standing: Poor +    ADDITIONAL TESTS                                    ACTIVITY TOLERANCE  Pulse: 93  Heart Rate Source: Monitor  Resp: 15                O2 WALK  Oxygen Therapy  SPO2% on Room Air at Rest: 94    NEUROLOGICAL FINDINGS                        AM-PAC '6-Clicks' INPATIENT SHORT FORM - BASIC MOBILITY  How much difficulty does the patient currently have...  Patient Difficulty: Turning over in bed (including adjusting bedclothes, sheets and blankets)?: A Little   Patient Difficulty: Sitting down on and  standing up from a chair with arms (e.g., wheelchair, bedside commode, etc.): A Little   Patient Difficulty: Moving from lying on back to sitting on the side of the bed?: A Little   How much help from another person does the patient currently need...   Help from Another: Moving to and from a bed to a chair (including a wheelchair)?: A Little   Help from Another: Need to walk in hospital room?: A Little   Help from Another: Climbing 3-5 steps with a railing?: A Lot     AM-PAC Score:  Raw Score: 17   Approx Degree of Impairment: 50.57%   Standardized Score (AM-PAC Scale): 42.13   CMS Modifier (G-Code): CK    FUNCTIONAL ABILITY STATUS  Gait Assessment   Functional Mobility/Gait Assessment  Gait Assistance: Minimum assistance  Distance (ft): 4  Assistive Device: Rolling walker  Pattern: R Decreased stance time    Skilled Therapy Provided     Bed Mobility:  Rolling: CGA  Supine to sit: NT   Sit to supine: Min a of 1 for le's     Transfer Mobility:  Sit to stand: Cues for proper hand placement - min a of 1.   Stand to sit: Min a of 1  Gait = Pt completed gait 4'x1 w/ rw and min a of 1.  Pt slightly unsteady w/ gait due to high level of R le pain.  Could not progress gait yet due to pain, but was functional to get back to bed.    Therapist's Comments: Encouraged pt to perform ankle pumps.      Exercise/Education Provided:  Bed mobility  Functional activity tolerated  Gait training  Transfer training    Patient End of Session: In bed, Needs met, Call light within reach, RN aware of session/findings, All patient questions and concerns addressed, Alarm set (Rn ERWIN aware of eval session)      Patient Evaluation Complexity Level:  History Moderate - 1 or 2 personal factors and/or co-morbidities   Examination of body systems Moderate - addressing a total of 3 or more elements   Clinical Presentation  Moderate - Evolving   Clinical Decision Making Moderate Complexity       PT Session Time: 27 minutes  Gait Trainin  minutes  Therapeutic Activity: 12 minutes  Neuromuscular Re-education: 0 minutes  Therapeutic Exercise: 1 minutes

## 2025-03-21 NOTE — SPIRITUAL CARE NOTE
Spiritual Care Visit Note    Patient Name: Kera Reed Date of Spiritual Care Visit: 25   : 1945 Primary Dx: <principal problem not specified>       Referred By: Referral From: Family    Spiritual Care Taxonomy:    Intended Effects: Aligning care plan with patient's values    Methods: Offer support, Collaborate with care team member    Interventions: Assist someone with Advance Directives    Visit Type/Summary:     - PoA: New PoAH Created: Visited patient in response to PoA for Healthcare consult/request. Created a new PoAH for Healthcare document that, per the patient, accurately reflects their wishes. Gave the patient the original PoAH document along with copies. Confirmed that the PoAH primary agent name and contact information have been entered into the Epic, Advance Directives section. A copy of the new PoAH document has been placed on the patient's paper chart.    Spiritual Care support can be requested via an Epic consult. For urgent/immediate needs, please contact the On Call  at: [place]: ext [phone]    Grey Boyd MDiv

## 2025-03-21 NOTE — PROGRESS NOTES
ICU  Critical Care APRN Progress Note      Admission date: 3/20/2025      SUBJECTIVE: Hypotensive episode overnight with NS bolus given. BP borderline low 90s this AM. Pain uncontrolled with norco.     Current Hospital Medications  Current Facility-Administered Medications   Medication Dose Route Frequency    enoxaparin (Lovenox) 40 MG/0.4ML SUBQ injection 40 mg  40 mg Subcutaneous Nightly    acetaminophen (Tylenol) tab 650 mg  650 mg Oral Q4H PRN    Or    HYDROcodone-acetaminophen (Norco) 5-325 MG per tab 1 tablet  1 tablet Oral Q4H PRN    Or    HYDROcodone-acetaminophen (Norco) 5-325 MG per tab 2 tablet  2 tablet Oral Q4H PRN    morphINE PF 2 MG/ML injection 1 mg  1 mg Intravenous Q2H PRN    Or    morphINE PF 2 MG/ML injection 2 mg  2 mg Intravenous Q2H PRN    aspirin DR tab 81 mg  81 mg Oral Daily    clopidogrel (Plavix) tab 75 mg  75 mg Oral Daily    pantoprazole (Protonix) 40 mg in sodium chloride 0.9% PF 10 mL IV push  40 mg Intravenous QAM AC    Or    pantoprazole (Protonix) DR tab 40 mg  40 mg Oral QAM AC    metoprolol succinate ER (Toprol XL) 24 hr tab 50 mg  50 mg Oral Daily Beta Blocker    glucose (Dex4) 15 GM/59ML oral liquid 15 g  15 g Oral Q15 Min PRN    Or    glucose (Glutose) 40% oral gel 15 g  15 g Oral Q15 Min PRN    Or    glucose-vitamin C (Dex-4) chewable tab 4 tablet  4 tablet Oral Q15 Min PRN    Or    dextrose 50% injection 50 mL  50 mL Intravenous Q15 Min PRN    Or    glucose (Dex4) 15 GM/59ML oral liquid 30 g  30 g Oral Q15 Min PRN    Or    glucose (Glutose) 40% oral gel 30 g  30 g Oral Q15 Min PRN    Or    glucose-vitamin C (Dex-4) chewable tab 8 tablet  8 tablet Oral Q15 Min PRN    insulin aspart (NovoLOG) 100 Units/mL FlexPen 1-10 Units  1-10 Units Subcutaneous TID AC and HS    ALPRAZolam (Xanax) tab 0.25 mg  0.25 mg Oral Daily PRN    amiodarone (Pacerone) tab 200 mg  200 mg Oral Daily    bumetanide (Bumex) tab 1 mg  1 mg Oral BID (Diuretic)    busPIRone (Buspar) tab 10 mg  10 mg Oral BID     levothyroxine (Synthroid) tab 75 mcg  75 mcg Oral Before breakfast    warfarin (Coumadin) tab 2.5 mg  2.5 mg Oral Nightly    empagliflozin (Jardiance) tab 10 mg  10 mg Oral Daily       OBJECTIVE  VITAL SIGNS:   Temp:  [97 °F (36.1 °C)-98.7 °F (37.1 °C)] 98.7 °F (37.1 °C)  Pulse:  [] 94  Resp:  [11-29] 22  BP: ()/(50-81) 102/73  SpO2:  [89 %-99 %] 99 %  AO: ()/(57-82) 101/60           INTAKE/OUTPUT:    Intake/Output Summary (Last 24 hours) at 3/21/2025 0651  Last data filed at 3/21/2025 0600  Gross per 24 hour   Intake 3486 ml   Output 1600 ml   Net 1886 ml         Physical Exam:    General Appearance: Alert, cooperative, no acute distress  Neck: No JVD  Lungs: Clear to auscultation bilaterally, respirations unlabored  Heart: Regular rate and irregular rhythm, S1 and S2 normal, no murmur, rub or gallop  Abdomen: Soft, non-tender, bowel sounds active   Extremities: Atraumatic, no cyanosis or edema, capillary refill <3 sec.    Pulses: 2+ palpable DP/PT RLE, 1+ to LLE, right groin dressing intact--small amount old blood. Ecchymosis in right inner groin, tender and firm  Skin: Skin color, texture, turgor normal for ethnicity, no rashes or lesions, warm and dry  Neurologic: normal strength, sensation intact bilateral LEs, no numbness or tingling    Data This Admission:  Radiology:    No results found.      LABS:     Lab Results   Component Value Date    WBC 6.4 03/20/2025    HGB 11.2 03/20/2025    HCT 33.6 03/20/2025    .0 03/20/2025    CREATSERUM 0.99 03/20/2025    BUN 24 03/20/2025     03/20/2025    K 3.7 03/20/2025     03/20/2025    CO2 24.0 03/20/2025     03/20/2025    CA 8.6 03/20/2025    ALB 3.5 03/20/2025    ALKPHO 84 03/20/2025    BILT 0.9 03/20/2025    TP 5.5 03/20/2025    AST 19 03/20/2025    ALT 16 03/20/2025    PTT 26.2 03/20/2025    INR 1.29 03/21/2025         Assessment and Plan      Active Problems:  Infrarenal aortic stenosis, iliac stenosis and stenosis of  right common femoral artery POD#1 s/p right ileofemoral endarterectomy with vein patch, aortic stent and right iliac stent graft 3/20      Neuro/Psych: No current issues. Alert and oriented x4, no focal deficits.     #H/o anxiety  -Xanax nightly PRN    Cardiovascular:  #Infrarenal aortic stenosis, iliac stenosis and stenosis of right common femoral artery  POD#1 s/p right ileofemoral endarterectomy with vein patch, aortic and right iliac stent graft 3/20  -ASA, statin, plavix, BB  -BP goal 90-160mmHg  -Neurovascular checks per protocol  -Pain mgt  -Vascular surgery following    #H/o HTN  -BP goal as noted above  -BB, holding PTA bumex  -Cards following    H/o CAD s.p CABG (2012) PCI to LM/LAD 11/2024  -Plavix  -statin  -BB    #Chronic HFpEF  #Severe TR  -Last TTE 2/2025 with EF 50-55%, RV reduced fxn, dilated RA, severe tricuspid regurg  -BB  -PTA bumex, jardiance    Persistent Atrial fibrillation  -PTA amio  -Plan for eventually DCCV as outpatient  -EKG with atrial flutter  -Cards following      Pulmonary: On 2 L NC. Encourage IS. CXR pending.      Renal: Strict I/Os. No current issues.       GI: No current issues. Bowel regimen.      ID: Afebrile, S/p cefazolinn intra-op.       Heme/Onc:    #H/o DVT, PE  #H/o Factor V deficiency  -PTA coumadin      Endocrine:  #Newly diagnosed DM2  -A1c 6.8  -Accu checks  -ISS (Not on insulin at home)    #Hypothyroidism  -Synthroid    ICU Checklist  Nutrition: cardiac  Analgesia: yes  Sedation:   Thromboembolism PPX: coumadin  Stress Ulcer PPX: ppi  Bowel Regimen: yes  Glucose control: yes  Lines/drains/tubes: piv, courtney--dc today  Therapies: PT/OT     Code status: Full  Disp: stable to transfer to cardiac tele      Plan of care discussed with Critical Care Intensivist, Dr. Rylee Talbert, [facility]  ICU  Phone  [phone]   Pager [phone]

## 2025-03-21 NOTE — OCCUPATIONAL THERAPY NOTE
OCCUPATIONAL THERAPY EVALUATION - INPATIENT     Room Number: 455/455-A  Evaluation Date: 3/21/2025  Type of Evaluation: Initial  Presenting Problem: R ileofemoral endarterectomy with aortic and iliac stent graft    Physician Order: IP Consult to Occupational Therapy  Reason for Therapy: ADL/IADL Dysfunction and Discharge Planning    OCCUPATIONAL THERAPY ASSESSMENT   Patient is currently functioning below baseline with toileting, bathing, lower body dressing, grooming, bed mobility, transfers, static standing balance, dynamic standing balance, and functional standing tolerance. Prior to admission, patient's baseline is independent with device as needed in community.  Patient is requiring supervision to max assist as a result of the following impairments: decreased functional reach, pain, and impaired standing balance. Occupational Therapy will continue to follow for duration of hospitalization.    Patient will benefit from continued skilled OT Services at discharge to promote prior level of function.  Anticipate patient will return home with home health OT    History Related to Current Admission: Patient is a 80 year old female admitted on 3/20/2025 with Presenting Problem: R ileofemoral endarterectomy with aortic and iliac stent graft. Co-Morbidities : CAD, CABG, blood clotting disorder, h/o DVT/PE, AF, HF, HTN, HLD, anxiety, R foot gangrene    WEIGHT BEARING RESTRICTION       Recommendations for nursing staff:   Transfers: 1 person and RW  Toileting location: bedside commode    EVALUATION SESSION:  Patient Start of Session: chair    FUNCTIONAL TRANSFER ASSESSMENT  Sit to Stand: Chair  Chair: Minimal Assist    BED MOBILITY     BALANCE ASSESSMENT  Static Standing: Contact Guard Assist    FUNCTIONAL ADL ASSESSMENT  LB Dressing Seated: Maximum Assist    ACTIVITY TOLERANCE: denied dizziness or shortness of breath                         O2 SATURATIONS       COGNITION  Overall Cognitive Status:  WFL - within functional  limits    Upper Extremity   ROM: within functional limits   Strength: within functional limits   Coordination  Gross motor: wfl  Fine motor: wfl  Sensation: denied deficits    EDUCATION PROVIDED  Patient Education : Posture/Positioning; Functional Transfer Techniques; Role of Occupational Therapy; Plan of Care  Patient's Response to Education: Verbalized Understanding    Equipment used: rw, gait belt  Demonstrates functional use, Would benefit from additional trial yes     Therapist comments:   Patient was requesting to get back to bed. She required MIN A to stand up from the chair due to pain. Patient was assisted to transfer to edge of the bed with DEIDRE  in preparation for commode or toilet transfers. RW used. MOD A needed to bring BLEs in to supine. Patient assisted with comfort/positioning needs. Nurse aware of session.     Patient End of Session: Hospital anti-slip socks, All patient questions and concerns addressed, RN aware of session/findings, Call light within reach, Needs met, In bed    OCCUPATIONAL PROFILE    HOME SITUATION  Type of Home: House  Home Layout: Two level, Able to live on main level, Other (Comment) ([facility])  Lives With: Spouse (needs knee surgery)    Toilet and Equipment: Comfort height toilet, Grab bar  Shower/Tub and Equipment: Walk-in shower, Grab bar, Shower chair  Other Equipment: Long-handled shoehorn    Occupation/Status: retired     Drives: No  Patient Regularly Uses: Cane, Rolling walker (no device used at home; uses cane in community for short distances and RW for longer ones)    Prior Level of Function: Lives with her spouse in [facility] in a two level house but stays on the main level. Patient does not drive. She is independent with self care and mobility , does not use a device unless in the community. Owns a cane and RW.    SUBJECTIVE   \"I have a shoehorn but I don't really use it.\"    PAIN ASSESSMENT  Ratin  Location: RLE incisions  Management Techniques: Relaxation,  Repositioning, Nurse notified    OBJECTIVE  Precautions: Other (Comment) (R groin incision, R upper arm hematoma)  Fall Risk: High fall risk    ASSESSMENTS    AM-PAC ‘6-Clicks’ Inpatient Daily Activity Short Form  -   Putting on and taking off regular lower body clothing?: A Lot  -   Bathing (including washing, rinsing, drying)?: A Lot  -   Toileting, which includes using toilet, bedpan or urinal? : A Little  -   Putting on and taking off regular upper body clothing?: A Little  -   Taking care of personal grooming such as brushing teeth?: None  -   Eating meals?: None    AM-PAC Score:  Score: 18  Approx Degree of Impairment: 46.65%  Standardized Score (AM-PAC Scale): 38.66    ADDITIONAL TESTS     NEUROLOGICAL FINDINGS      COGNITION ASSESSMENTS     PLAN  OT Device Recommendations: Reacher, Sock aid  OT Treatment Plan: Functional transfer training, ADL training, Energy conservation/work simplification techniques, Endurance training, Patient/Family training, Equipment eval/education, Patient/Family education, Compensatory technique education  Rehab Potential : Good  Frequency: 3-5x/week  Number of Visits to Meet Established Goals: 2    ADL Goals   Patient will perform grooming: with supervision and while standing at sink  Patient will perform lower body dressing:  with supervision and with adaptive equipment PRN  Patient will perform toileting: with supervision    Functional Transfer Goals  Patient will transfer from sit to supine:  with supervision  Patient will transfer from supine to sit:  with supervision  Patient will transfer to toilet:  with supervision  Patient will use leg lift or similar item to assist LEs with bed mobility as needed.       Patient Evaluation Complexity Level:   Occupational Profile/Medical History LOW - Brief history including review of medical or therapy records    Specific performance deficits impacting engagement in ADL/IADL LOW  1 - 3 performance deficits    Client  Assessment/Performance Deficits MODERATE - Comorbidities and min to mod modifications of tasks    Clinical Decision Making MODERATE - Analysis of occupational profile, detailed assessments, several treatment options    Overall Complexity LOW     OT Session Time: 15 minutes    Therapeutic Activity: 10 minutes

## 2025-03-21 NOTE — PLAN OF CARE
Patient remains AO x 4, able to express needs, follows commands.  SBP > 90/MAP 65.  SR to ST w/ BBB on the monitor, HR 80's - 110.  SpO2 > 92% on RA.  (R) groin site assessed, cleaned and re-dressed by Dr. Hoffmann.  Site clean, some serosanguinous discharge.  Bruising noted to site, no hematoma noted.  Art line and paniagua removed during shift. Patient up in chair x 2 today.  Pain to groin site controlled with PO/IV meds.   at bedside during shift.  Plan for DC tomorrow.

## 2025-03-21 NOTE — CM/SW NOTE
03/21/25 1600   CM/SW Referral Data   Referral Source    Reason for Referral Discharge planning   Informant EMR     Patient is a 80 year old female admitted 3/20/2025 for R iliofemoral endarterectomy.      HOME SITUATION  Type of Home: House  Home Layout: Two level, Able to live on main level, Other (Comment) ([place])  Stairs to Enter : 1        Stairs to Bedroom: 0         Lives With: Spouse (needs knee surgery)    Drives: No   Patient Regularly Uses: Cane, Rolling walker (no device used at home; uses cane in community for short distances and RW for longer ones)       Prior Level of [place]: Pt typically is independent w/ adl's.  Uses a rolling walker for longer community distances, cane for shorter community distances and no device w/I her house.  Does not need to do stairs when she returns home.    HH referral sent via [facility], will need to provide HH choice to pt.     Ta Mariano, PING RN, CM  X [phone]

## 2025-03-22 LAB
ALBUMIN SERPL-MCNC: 3.5 G/DL (ref 3.2–4.8)
ALBUMIN/GLOB SERPL: 1.8 {RATIO} (ref 1–2)
ALP LIVER SERPL-CCNC: 70 U/L
ALT SERPL-CCNC: <7 U/L
ANION GAP SERPL CALC-SCNC: 11 MMOL/L (ref 0–18)
AST SERPL-CCNC: 13 U/L (ref ?–34)
BASOPHILS # BLD AUTO: 0.02 X10(3) UL (ref 0–0.2)
BASOPHILS NFR BLD AUTO: 0.3 %
BILIRUB SERPL-MCNC: 0.8 MG/DL (ref 0.2–1.1)
BUN BLD-MCNC: 19 MG/DL (ref 9–23)
CALCIUM BLD-MCNC: 8.9 MG/DL (ref 8.7–10.6)
CHLORIDE SERPL-SCNC: 101 MMOL/L (ref 98–112)
CO2 SERPL-SCNC: 25 MMOL/L (ref 21–32)
CREAT BLD-MCNC: 0.98 MG/DL
EGFRCR SERPLBLD CKD-EPI 2021: 58 ML/MIN/1.73M2 (ref 60–?)
EOSINOPHIL # BLD AUTO: 0.06 X10(3) UL (ref 0–0.7)
EOSINOPHIL NFR BLD AUTO: 0.9 %
ERYTHROCYTE [DISTWIDTH] IN BLOOD BY AUTOMATED COUNT: 18.6 %
GLOBULIN PLAS-MCNC: 2 G/DL (ref 2–3.5)
GLUCOSE BLD-MCNC: 111 MG/DL (ref 70–99)
GLUCOSE BLD-MCNC: 111 MG/DL (ref 70–99)
GLUCOSE BLD-MCNC: 113 MG/DL (ref 70–99)
GLUCOSE BLD-MCNC: 116 MG/DL (ref 70–99)
GLUCOSE BLD-MCNC: 128 MG/DL (ref 70–99)
HCT VFR BLD AUTO: 30.4 %
HGB BLD-MCNC: 10.1 G/DL
IMM GRANULOCYTES # BLD AUTO: 0.11 X10(3) UL (ref 0–1)
IMM GRANULOCYTES NFR BLD: 1.6 %
INR BLD: 1.25 (ref 0.8–1.2)
LYMPHOCYTES # BLD AUTO: 0.89 X10(3) UL (ref 1–4)
LYMPHOCYTES NFR BLD AUTO: 13 %
MCH RBC QN AUTO: 32.2 PG (ref 26–34)
MCHC RBC AUTO-ENTMCNC: 33.2 G/DL (ref 31–37)
MCV RBC AUTO: 96.8 FL
MONOCYTES # BLD AUTO: 0.76 X10(3) UL (ref 0.1–1)
MONOCYTES NFR BLD AUTO: 11.1 %
NEUTROPHILS # BLD AUTO: 5.02 X10 (3) UL (ref 1.5–7.7)
NEUTROPHILS # BLD AUTO: 5.02 X10(3) UL (ref 1.5–7.7)
NEUTROPHILS NFR BLD AUTO: 73.1 %
OSMOLALITY SERPL CALC.SUM OF ELEC: 287 MOSM/KG (ref 275–295)
PLATELET # BLD AUTO: 185 10(3)UL (ref 150–450)
POTASSIUM SERPL-SCNC: 3.8 MMOL/L (ref 3.5–5.1)
PROT SERPL-MCNC: 5.5 G/DL (ref 5.7–8.2)
PROTHROMBIN TIME: 15.8 SECONDS (ref 11.6–14.8)
RBC # BLD AUTO: 3.14 X10(6)UL
SODIUM SERPL-SCNC: 137 MMOL/L (ref 136–145)
WBC # BLD AUTO: 6.9 X10(3) UL (ref 4–11)

## 2025-03-22 PROCEDURE — 99232 SBSQ HOSP IP/OBS MODERATE 35: CPT | Performed by: HOSPITALIST

## 2025-03-22 PROCEDURE — 99232 SBSQ HOSP IP/OBS MODERATE 35: CPT | Performed by: INTERNAL MEDICINE

## 2025-03-22 RX ORDER — DIGOXIN 0.25 MG/ML
125 INJECTION INTRAMUSCULAR; INTRAVENOUS ONCE
Status: COMPLETED | OUTPATIENT
Start: 2025-03-22 | End: 2025-03-22

## 2025-03-22 RX ORDER — POTASSIUM CHLORIDE 1.5 G/1.58G
40 POWDER, FOR SOLUTION ORAL ONCE
Status: COMPLETED | OUTPATIENT
Start: 2025-03-22 | End: 2025-03-22

## 2025-03-22 RX ORDER — METOPROLOL TARTRATE 25 MG/1
25 TABLET, FILM COATED ORAL EVERY 12 HOURS
Status: DISCONTINUED | OUTPATIENT
Start: 2025-03-22 | End: 2025-03-25

## 2025-03-22 RX ORDER — WARFARIN SODIUM 5 MG/1
5 TABLET ORAL NIGHTLY
Status: DISCONTINUED | OUTPATIENT
Start: 2025-03-22 | End: 2025-03-24

## 2025-03-22 NOTE — PROGRESS NOTES
CRITICAL CARE PROGRESS NOTE    Patient Name: Kera Reed  : 1945  MRN: [id number]  Admit Date: 3/20/2025  Length of Stay: 2 Days    Subjective/24h events:  POD #2 s/p R ileofemoral endarterectomy with vein patch, aortic stent, and R iliac stent graft.     Up in chair this morning. Having moderate incisional pain. No events overnight.     Physical Exam  General: No acute distress  Neuro: AO x3, non focal   Lungs: Clear  Cardio:  RRR  Abdomen: Soft, non tender, non distended  Extremities: Right fem dressing clean dry and intact, thigh with petechiae and some edema      Hemodynamics  24h Vitals:  VitalLast Value (24 Hour)24 Hour Range  Temp98.5 °F (36.9 °C)Temp  Min: 97.6 °F (36.4 °C)  Max: 98.5 °F (36.9 °C)  [id number]  Min: 93  Max: 115  BP (Non-Invasive)94/66 BP  Min: 80/56  Max: 105/74  BP (A-Line) No data recorded  CVP  could not be evaluated. This SmartLink does not work with rows of the type:   [id number]  Min: 10  Max: 23  [id number] %SpO2  Min: 84 %  Max: 97 %  O2 Therapy         Assessment and Plan:  S/p R ileofemoral endarterectomy 3/20  Acute blood loss anemia  H/o HTN  H/o CAD s/p CABG  H/o HFpEF, severe TR, RV dysfunction   H/o Chronic AF  H/o Factor V deficiency, DVT/PE on coumadin   H/o DM2  H/o Hypothyroid     Neuro/Psych: PRN tylenol/norco for postop pain . Home buspar and xanax resumed.    Cardiovascular: Hemodynamically stable. BP low normal would hold antihypertensives/GDMT for now. ASA/plavix. Vascular surgery resumed coumadin. Plan is to stop ASA once INR therapeutic. Amiodarone for rate control. Card following    Pulmonary: No acute issues on room air     GI: ADAT, PPI, bowel regimen PRN     Renal/Metabolic: No acute issues, renal function stable, voiding freely, electrolyte protocol. Home duiretics resumed by hospitalist - will volume assess today     Heme: Coumadin resumed per vascular, daily INR    ID: No acute issues, postop abx completed     Endocrine: BG stable- Jardiance and synthroid  resumed     ICU checklist  Feeding: As tolerated  Analgesia: norco/tylenol  Sedation: NA  Thromboembolism PPX: coumadin   Stress Ulcer PPX: PPI  Glucose control: <180  Bowel Regimen: PRN  Lines/drains/tubes: PIV  Deescalation of antibiotics: NA  Therapies:PT/OT/ST when appropriate    Code Status: Full Code     D/w critical care attending Dr. Laron Blanco, [facility]

## 2025-03-22 NOTE — PHYSICAL THERAPY NOTE
PHYSICAL THERAPY TREATMENT NOTE - INPATIENT    Room Number: 455/455-A     Session: 1     Number of Visits to Meet Established Goals: 5    Presenting Problem: R iliofemoral endarterectomy 3/20  Co-Morbidities : CAD, CABG, blood clotting disorder, h/o DVT/PE, AF, HF, HTN, HLD, anxiety, R foot gangrene    PHYSICAL THERAPY ASSESSMENT   Patient demonstrates fair progress this session, goals  remain in progress.      Patient is requiring contact guard assist, minimal assist, and moderate assist as a result of the following impairments: decreased functional strength, decreased endurance/aerobic capacity, pain, impaired standing balance, decreased muscular endurance, and medical status.     Patient continues to function below baseline with bed mobility, transfers, and gait.  Next session anticipate patient to progress bed mobility, transfers, and gait.  Physical Therapy will continue to follow patient for duration of hospitalization.    Patient continues to benefit from continued skilled PT services: at discharge to promote prior level of function and safety with additional support and return home with home health PT.    PLAN DURING HOSPITALIZATION  Nursing Mobility Recommendation : 1 Assist  PT Device Recommendation: Rolling walker  PT Treatment Plan: Bed mobility, Patient education, Family education, Gait training, Transfer training, Balance training  Frequency (Obs): 3-5x/week     CURRENT GOALS     Goal #1 Patient is able to demonstrate supine - sit EOB @ level: modified independent      Goal #2 Patient is able to demonstrate transfers EOB to/from C at assistance level: supervision      Goal #3 Patient is able to ambulate 50 feet with assist device: walker - rolling at assistance level: supervision      Goal #4     Goal #5     Goal #6     Goal Comments: Goals established on 3/21/2025  3/22/2025 all goals ongoing     PHYSICAL THERAPY MEDICAL/SOCIAL HISTORY  History related to current admission: Patient is a 80 year old  female admitted on 3/20/2025 from home for planned R iliofemoral endarterectomy.       HOME SITUATION  Type of Home: House  Home Layout: Two level, Able to live on main level, Other (Comment) ([place])  Stairs to Enter : 1        Stairs to Bedroom: 0         Lives With: Spouse (needs knee surgery)    Drives: No   Patient Regularly Uses: Cane, Rolling walker (no device used at home; uses cane in community for short distances and RW for longer ones)       Prior Level of [place]: Pt typically is independent w/ adl's.  Uses a rolling walker for longer community distances, cane for shorter community distances and no device w/I her house.  Does not need to do stairs when she returns home.    SUBJECTIVE  Pt reports her leg \"hurts so much\" today. States pain is worse now than earlier today - RN aware    OBJECTIVE  Precautions: Other (Comment) (R groin incision, R upper arm hematoma)    WEIGHT BEARING RESTRICTION     PAIN ASSESSMENT   Ratin  Location: R LE  Management Techniques: Activity promotion, Repositioning, Breathing techniques    BALANCE                                                                                                                       Static Sitting: Fair -  Dynamic Sitting: Fair -           Static Standing: Poor +  Dynamic Standing: Poor +    ACTIVITY TOLERANCE           BP: 107/72  BP Location: Left arm  BP Method: Automatic  Patient Position: Semi-Lord    O2 WALK  Oxygen Therapy  SPO2% on Room Air at Rest: 96    AM-PAC '6-Clicks' INPATIENT SHORT FORM - BASIC MOBILITY  How much difficulty does the patient currently have...  Patient Difficulty: Turning over in bed (including adjusting bedclothes, sheets and blankets)?: A Little   Patient Difficulty: Sitting down on and standing up from a chair with arms (e.g., wheelchair, bedside commode, etc.): A Little   Patient Difficulty: Moving from lying on back to sitting on the side of the bed?: A Little   How much help from another person does  the patient currently need...   Help from Another: Moving to and from a bed to a chair (including a wheelchair)?: A Little   Help from Another: Need to walk in hospital room?: A Little   Help from Another: Climbing 3-5 steps with a railing?: A Lot     AM-PAC Score:  Raw Score: 17   Approx Degree of Impairment: 50.57%   Standardized Score (AM-PAC Scale): 42.13   CMS Modifier (G-Code): CK    FUNCTIONAL ABILITY STATUS  Gait Assessment   Functional Mobility/Gait Assessment  Gait Assistance: Contact guard assist  Distance (ft): 5, 10  Assistive Device: Rolling walker  Pattern: R Decreased stance time    Skilled Therapy Provided    Bed Mobility:   Supine<>Sit: mod A   Sit<>Supine: min A for B LE     Transfer Mobility:  Sit<>Stand: min A from bed and from commode to RW   Stand<>Sit: min A to commode; CGA to chair   Gait: Pt ambulated 3' from bed to commode, 5' from commode to chair, and then additional 10' - all with RW and CGA; seated rests between walks; cued for walker positioning, sequencing.    Therapist's Comments: Pt lying in bed and agreeable to PT. Pt transferred to commode for toileting; required assist for janet care but able to stand with supervision during janet care. Pt's increased R LE pain limiting mobility at this time. Pt completed B ankle pumps, other LE ex with L LE only; pt unwilling to attempt to actively move R LE due to pain  -122 when ambulating.      THERAPEUTIC EXERCISES  Lower Extremity Ankle pumps  Hip AB/AD  Heel slides  LAQ -L LE only     Upper Extremity      Position Sitting and Supine     Repetitions   10   Sets   1     Patient End of Session: In bed, Needs met, Call light within reach, RN aware of session/findings, All patient questions and concerns addressed, Hospital anti-slip socks    PT Session Time: 26 minutes  Gait Trainin minutes  Therapeutic Activity: 13 minutes  Therapeutic Exercise: 5 minutes

## 2025-03-22 NOTE — PROGRESS NOTES
[facility]   part of [facility]     Hospitalist Progress Note     Kera Reed Patient Status:  Inpatient    1945 MRN [id number]   Location [facility] 4SW-A Attending John Hoffmann MD   Hosp Day # 2 PCP Daya Chandler MD     Chief Complaint: Medical management    Subjective:     Patient doing well overall, some pain, no cp, sob.    Objective:    Review of Systems:   A comprehensive review of systems was completed; pertinent positive and negatives stated in subjective.    Vital signs:  Temp:  [97.6 °F (36.4 °C)-98.5 °F (36.9 °C)] 98.5 °F (36.9 °C)  Pulse:  [] 102  Resp:  [9-23] 9  BP: ()/(56-77) 112/74  SpO2:  [84 %-98 %] 98 %    Physical Exam:    General: No acute distress  Respiratory: No wheezes, no rhonchi  Cardiovascular: S1, S2, regular rate and rhythm  Abdomen: Soft, Non-tender, non-distended, positive bowel sounds  Neuro: No new focal deficits.   Extremities: No edema      Diagnostic Data:    Labs:  Recent Labs   Lab 25  0559 25  1227 25  0500 25  1446 25  0409   WBC  --  6.4 7.6  --  6.9   HGB  --  11.2* 9.7*  --  10.1*   MCV  --  96.8 96.3  --  96.8   PLT  --  214.0 199.0  --  185.0   INR 1.12  --  1.29* 1.19 1.25*       Recent Labs   Lab 25  1227 25  0500 25  0409   * 109* 111*   BUN 24* 18 19   CREATSERUM 0.99 0.91 0.98   CA 8.6* 8.6* 8.9   ALB 3.5  --  3.5    139 137   K 3.7 4.4  4.4 3.8    107 101   CO2 24.0 27.0 25.0   ALKPHO 84  --  70   AST 19  --  13   ALT 16  --  <7*   BILT 0.9  --  0.8   TP 5.5*  --  5.5*       Estimated Glomerular Filtration Rate: 58 mL/min/1.73m2 (A) (result from lab).    No results for input(s): \"TROP\", \"TROPHS\", \"CK\" in the last 168 hours.    Recent Labs   Lab 25  0500 25  1446 25  0409   PTP 16.2* 15.2* 15.8*   INR 1.29* 1.19 1.25*                  Microbiology    No results found for this visit on 25.      Imaging: Reviewed in  Epic.    Medications:    warfarin  5 mg Oral Nightly    enoxaparin  40 mg Subcutaneous Nightly    aspirin  81 mg Oral Daily    clopidogrel  75 mg Oral Daily    pantoprazole  40 mg Intravenous QAM AC    Or    pantoprazole  40 mg Oral QAM AC    metoprolol succinate ER  50 mg Oral Daily Beta Blocker    insulin aspart  1-10 Units Subcutaneous TID AC and HS    amiodarone  200 mg Oral Daily    bumetanide  1 mg Oral BID (Diuretic)    busPIRone  10 mg Oral BID    levothyroxine  75 mcg Oral Before breakfast    empagliflozin  10 mg Oral Daily       Assessment & Plan:      #Infrarenal aortic stenosis, iliac stenosis, stenosis of right common femoral artery s/p stent graft placement of the abdominal aorta, right common iliac and endarterectomy  Per cardiology, per vascular surgery  Cont ASA, plavix       #h/o DVT/PE, possible Factor V Leiden  On coumadin which is on hold  Coumadin resumed     #CHF, compensated, resume GDMT     #DMII, hyperglycemia protocol     #CAD with prev CABG 2012, PCI in 11/2024     #HTN, controlled     #DL, not on a statin    #Hypothyroidism, Synthroid    #A fib, persistent, on Amiodarone, Metoprolol, and Coumadin      #COPD, stable      #Anxiety/depression, resume PTA meds, Xanax, Buspirone    #Obesity, BMI 30      Anabela Najera MD    Supplementary Documentation:     Quality:  DVT Mechanical Prophylaxis:        DVT Pharmacologic Prophylaxis   Medication    warfarin (Coumadin) tab 5 mg    enoxaparin (Lovenox) 40 MG/0.4ML SUBQ injection 40 mg         DVT Pharmacologic prophylaxis: Aspirin 81 mg      Code Status: Full Code  Franks: No urinary catheter in place  Franks Duration (in days):   Central line:    LACHELLE: 3/25/2025    Discharge is dependent on: progress  At this point Ms. Reed is expected to be discharge to: tbd    The 21st Century Cures Act makes medical notes like these available to patients in the interest of transparency. Please be advised this is a medical document. Medical documents are intended  to carry relevant information, facts as evident, and the clinical opinion of the practitioner. The medical note is intended as peer to peer communication and may appear blunt or direct. It is written in medical language and may contain abbreviations or verbiage that are unfamiliar.              **Certification      PHYSICIAN Certification of Need for Inpatient Hospitalization - Initial Certification    Patient will require inpatient services that will reasonably be expected to span two midnight's based on the clinical documentation in H+P.   Based on patients current state of illness, I anticipate that, after discharge, patient will require TBD.

## 2025-03-22 NOTE — PLAN OF CARE
Assumed care of patient after report. Alert and oriented. RA while awake. Placed on 2L NC while sleeping. NSR/ST on tele. BP soft but stable. Pain noted to L groin with movement. Norco given. Dressing in place. Denies numbness or tingling, SOB, and nausea. Ambulated to chair and BSC. Adequate UOP. See MAR and flowsheets for additional information.

## 2025-03-22 NOTE — PROGRESS NOTES
Assumed pt care at 1630  Pt resting in bed in no apparent distress.   States of some pain in rt groin incision site, declined pain medication and stated she will wait   Assessed site and CDI with same petechiae noted  Tolerating CCHO diet  Pt denies n/v, Abd soft non distended.   (+)BS.  VSS. Afebrile. NSR-ST on tele. HR 80's-110s O2 sats >90% on RA  All safety precautions in place and all needs met at this time

## 2025-03-22 NOTE — PROGRESS NOTES
[facility]   part of [facility]     Hospitalist Progress Note     Kera Reed Patient Status:  Inpatient    1945 MRN [id number]   Location [facility] 4SW-A Attending John Hoffmann MD   Hosp Day # 2 PCP Daya Chandler MD     Chief Complaint: Medical management    Subjective:     Patient doing well overall, some pain, no cp, sob.    Objective:    Review of Systems:   A comprehensive review of systems was completed; pertinent positive and negatives stated in subjective.    Vital signs:  Temp:  [97.6 °F (36.4 °C)-98.5 °F (36.9 °C)] 98.5 °F (36.9 °C)  Pulse:  [] 102  Resp:  [9-23] 9  BP: ()/(56-77) 112/74  SpO2:  [84 %-98 %] 98 %    Physical Exam:    General: No acute distress  Respiratory: No wheezes, no rhonchi  Cardiovascular: S1, S2, regular rate and rhythm  Abdomen: Soft, Non-tender, non-distended, positive bowel sounds  Neuro: No new focal deficits.   Extremities: No edema      Diagnostic Data:    Labs:  Recent Labs   Lab 25  0559 25  1227 25  0500 25  1446 25  0409   WBC  --  6.4 7.6  --  6.9   HGB  --  11.2* 9.7*  --  10.1*   MCV  --  96.8 96.3  --  96.8   PLT  --  214.0 199.0  --  185.0   INR 1.12  --  1.29* 1.19 1.25*       Recent Labs   Lab 25  1227 25  0500 25  0409   * 109* 111*   BUN 24* 18 19   CREATSERUM 0.99 0.91 0.98   CA 8.6* 8.6* 8.9   ALB 3.5  --  3.5    139 137   K 3.7 4.4  4.4 3.8    107 101   CO2 24.0 27.0 25.0   ALKPHO 84  --  70   AST 19  --  13   ALT 16  --  <7*   BILT 0.9  --  0.8   TP 5.5*  --  5.5*       Estimated Glomerular Filtration Rate: 58 mL/min/1.73m2 (A) (result from lab).    No results for input(s): \"TROP\", \"TROPHS\", \"CK\" in the last 168 hours.    Recent Labs   Lab 25  0500 25  1446 25  0409   PTP 16.2* 15.2* 15.8*   INR 1.29* 1.19 1.25*                  Microbiology    No results found for this visit on 25.      Imaging: Reviewed in  Epic.    Medications:    warfarin  5 mg Oral Nightly    enoxaparin  40 mg Subcutaneous Nightly    aspirin  81 mg Oral Daily    clopidogrel  75 mg Oral Daily    pantoprazole  40 mg Intravenous QAM AC    Or    pantoprazole  40 mg Oral QAM AC    metoprolol succinate ER  50 mg Oral Daily Beta Blocker    insulin aspart  1-10 Units Subcutaneous TID AC and HS    amiodarone  200 mg Oral Daily    bumetanide  1 mg Oral BID (Diuretic)    busPIRone  10 mg Oral BID    levothyroxine  75 mcg Oral Before breakfast    empagliflozin  10 mg Oral Daily       Assessment & Plan:      #Infrarenal aortic stenosis, iliac stenosis, stenosis of right common femoral artery s/p stent graft placement of the abdominal aorta, right common iliac and endarterectomy  Per cardiology, per vascular surgery  Cont ASA, plavix  Excellent doppler flow     #h/o DVT/PE, possible Factor V Leiden  On coumadin which is on hold  Coumadin resumed, dose increased, INR 1.25     #CHF, compensated, resume GDMT     #DMII, hyperglycemia protocol     #CAD with prev CABG 2012, PCI in 11/2024     #HTN, controlled     #DL, not on a statin    #Hypothyroidism, Synthroid    #A fib, persistent, on Amiodarone, Metoprolol, and Coumadin, INR subtherapeutic     #COPD, stable      #Anxiety/depression, resume PTA meds, Xanax, Buspirone    #Obesity, BMI 30      Anabela Najera MD    Supplementary Documentation:     Quality:  DVT Mechanical Prophylaxis:        DVT Pharmacologic Prophylaxis   Medication    warfarin (Coumadin) tab 5 mg    enoxaparin (Lovenox) 40 MG/0.4ML SUBQ injection 40 mg         DVT Pharmacologic prophylaxis: Aspirin 81 mg      Code Status: Full Code  Franks: No urinary catheter in place  Franks Duration (in days):   Central line:    LACHELLE: 3/25/2025    Discharge is dependent on: progress  At this point Ms. Reed is expected to be discharge to: tbd    The 21st Century Cures Act makes medical notes like these available to patients in the interest of transparency. Please be  advised this is a medical document. Medical documents are intended to carry relevant information, facts as evident, and the clinical opinion of the practitioner. The medical note is intended as peer to peer communication and may appear blunt or direct. It is written in medical language and may contain abbreviations or verbiage that are unfamiliar.              **Certification      PHYSICIAN Certification of Need for Inpatient Hospitalization - Initial Certification    Patient will require inpatient services that will reasonably be expected to span two midnight's based on the clinical documentation in H+P.   Based on patients current state of illness, I anticipate that, after discharge, patient will require TBD.

## 2025-03-22 NOTE — PROGRESS NOTES
No new complaints except incisional pain. Neoro intact. Excellent doppler flow right/ left foot. Incisions clean/ dry- Will increase Coumadin to 5mg/ day. Continue PT/OT,

## 2025-03-22 NOTE — PLAN OF CARE
Received patient after report. Patient up in chair eating breakfast. Throughout shift, reports severe pain to right groin, see MAR. Provider notified. Transfer orders in place. Report given, all belongings sent with patient. Updated on plan of care.

## 2025-03-22 NOTE — PROGRESS NOTES
[facility]  Cardiology Progress Note    Kera Reed Patient Status:  Inpatient    1945 MRN [id number]   [facility] 4SW-A Attending John Hoffmann MD   Hosp Day # 2 PCP Daya Chandler MD       Subjective:  Incision site hurts, but otherwise feels okay    --------------------------------------------------------------------------------------------------------------------------------  ROS 12 systems reviewed and at baseline, pertinent findings above.      History:  Past Medical History:    Allergic rhinitis    Congestive heart disease (HCC)    Coronary atherosclerosis    Deep vein thrombosis (HCC)    Disorder of thyroid    Factor V deficiency (HCC)    High blood pressure    High cholesterol    History of blood transfusion    Hypothyroidism    Pulmonary embolism (HCC)    Shortness of breath     Past Surgical History:   Procedure Laterality Date    Bypass surgery      cardiac    Cabg      [facility]    [facility]      Hysterectomy      Knee replacement surgery      Total knee replacement       Family History   Family history unknown: Yes      reports that she quit smoking about 25 years ago. Her smoking use included cigarettes. She started smoking about 65 years ago. She has a 40 pack-year smoking history. She has never used smokeless tobacco. She reports that she does not currently use alcohol. She reports that she does not use drugs.    Objective:   Temp: 98.5 °F (36.9 °C)  Pulse: 102  Resp: 9  BP: 107/72    Intake/Output:     Intake/Output Summary (Last 24 hours) at 3/22/2025 1255  Last data filed at 3/22/2025 0905  Gross per 24 hour   Intake 865 ml   Output 3150 ml   Net -2285 ml       Physical Exam:  General: NAD.  HEENT: Normocephalic.  Neck: Supple.  Cardiac: Reg. S1, S2 normal. Soft JEANIE.  Lungs: Diminished.  Extremities: No edema.    Tele sinus tachy    Assessment:    S/P Rt Iliofemoral endarterectomy, infra-renal aortic & Rt SHANNON  stent  Persistent AFL/Fib  HFrEF (recovered)  Severe TR  HTN  DLP      Plan:  AFL with mild RVR. Reasonably well compensated from cardiac perspective  Continue GDTM, will reassess rate control after pain improves. Continue metoprolol, amiodarone  Triple therapy. DAPT & VKA. May DC ASA in the future    Discussed with patient. Questions answered.    Please call with questions.     Level of care: L3    Hosea Villalpando MD  Interventional Cardiology  [facility]    3/22/2025  12:55 PM

## 2025-03-23 LAB
GLUCOSE BLD-MCNC: 108 MG/DL (ref 70–99)
GLUCOSE BLD-MCNC: 112 MG/DL (ref 70–99)
GLUCOSE BLD-MCNC: 121 MG/DL (ref 70–99)
GLUCOSE BLD-MCNC: 91 MG/DL (ref 70–99)
INR BLD: 1.32 (ref 0.8–1.2)
POTASSIUM SERPL-SCNC: 3.8 MMOL/L (ref 3.5–5.1)
PROTHROMBIN TIME: 16.5 SECONDS (ref 11.6–14.8)

## 2025-03-23 PROCEDURE — 99232 SBSQ HOSP IP/OBS MODERATE 35: CPT | Performed by: HOSPITALIST

## 2025-03-23 RX ORDER — POTASSIUM CHLORIDE 1500 MG/1
40 TABLET, EXTENDED RELEASE ORAL ONCE
Status: COMPLETED | OUTPATIENT
Start: 2025-03-23 | End: 2025-03-23

## 2025-03-23 NOTE — PLAN OF CARE
Assumed care at 0700. Pt A/O x4. VIDA Wolfe on tele. VSS.  PRN [place] and morphine for pain. Worked with PT/OT.  Up in the chair most of the day. Up x2 with the walker.   Plan for home with HH pt/ot when medically cleared.  Pt updated on POC. Call light within reach.

## 2025-03-23 NOTE — PROGRESS NOTES
[facility]   part of [facility]     Hospitalist Progress Note     Kera Reed Patient Status:  Inpatient    1945 MRN [id number]   Location [facility] 4SW-A Attending John Hoffmann MD   Hosp Day # 3 PCP Daya Chandler MD     Chief Complaint: Medical management    Subjective:     Patient doing well overall, some pain, no cp, sob.    Objective:    Review of Systems:   A comprehensive review of systems was completed; pertinent positive and negatives stated in subjective.    Vital signs:  Temp:  [97.8 °F (36.6 °C)-98.6 °F (37 °C)] 97.9 °F (36.6 °C)  Pulse:  [102-133] 103  Resp:  [9-22] 18  BP: ()/(64-77) 105/67  SpO2:  [90 %-98 %] 95 %    Physical Exam:    General: No acute distress  Respiratory: No wheezes, no rhonchi  Cardiovascular: S1, S2, regular rate and rhythm  Abdomen: Soft, Non-tender, non-distended, positive bowel sounds  Neuro: No new focal deficits.   Extremities: No edema      Diagnostic Data:    Labs:  Recent Labs   Lab 25  0559 25  1227 25  0500 25  1446 25  0409 25  0510   WBC  --  6.4 7.6  --  6.9  --    HGB  --  11.2* 9.7*  --  10.1*  --    MCV  --  96.8 96.3  --  96.8  --    PLT  --  214.0 199.0  --  185.0  --    INR 1.12  --  1.29* 1.19 1.25* 1.32*       Recent Labs   Lab 25  1227 25  0500 25  0409 25  0448   * 109* 111*  --    BUN 24* 18 19  --    CREATSERUM 0.99 0.91 0.98  --    CA 8.6* 8.6* 8.9  --    ALB 3.5  --  3.5  --     139 137  --    K 3.7 4.4  4.4 3.8 3.8    107 101  --    CO2 24.0 27.0 25.0  --    ALKPHO 84  --  70  --    AST 19  --  13  --    ALT 16  --  <7*  --    BILT 0.9  --  0.8  --    TP 5.5*  --  5.5*  --        Estimated Glomerular Filtration Rate: 58 mL/min/1.73m2 (A) (result from lab).    No results for input(s): \"TROP\", \"TROPHS\", \"CK\" in the last 168 hours.    Recent Labs   Lab 25  1446 25  0409 25  0510   PTP 15.2* 15.8* 16.5*   INR 1.19 1.25*  1.32*                  Microbiology    No results found for this visit on 03/20/25.      Imaging: Reviewed in Epic.    Medications:    warfarin  5 mg Oral Nightly    metoprolol tartrate  25 mg Oral q12h    enoxaparin  40 mg Subcutaneous Nightly    aspirin  81 mg Oral Daily    clopidogrel  75 mg Oral Daily    pantoprazole  40 mg Intravenous QAM AC    Or    pantoprazole  40 mg Oral QAM AC    [Held by provider] metoprolol succinate ER  50 mg Oral Daily Beta Blocker    insulin aspart  1-10 Units Subcutaneous TID AC and HS    amiodarone  200 mg Oral Daily    bumetanide  1 mg Oral BID (Diuretic)    busPIRone  10 mg Oral BID    levothyroxine  75 mcg Oral Before breakfast    empagliflozin  10 mg Oral Daily       Assessment & Plan:      #Infrarenal aortic stenosis, iliac stenosis, stenosis of right common femoral artery s/p stent graft placement of the abdominal aorta, right common iliac and endarterectomy  Per cardiology, per vascular surgery  Cont ASA, plavix until INR therapeutic, then dc ASA  Excellent doppler flow     #h/o DVT/PE, possible Factor V Leiden  On coumadin which is on hold  Coumadin resumed, dose increased, INR 1.32     #CHF, compensated, resume GDMT     #DMII, hyperglycemia protocol     #CAD with prev CABG 2012, PCI in 11/2024     #HTN, controlled     #DL, not on a statin    #Hypothyroidism, Synthroid    #A fib, persistent, on Amiodarone, Metoprolol, and Coumadin   BB resumed     #COPD, stable      #Anxiety/depression, resume PTA meds, Xanax, Buspirone    #Obesity, BMI 30      Anabela Najera MD    Supplementary Documentation:     Quality:  DVT Mechanical Prophylaxis:        DVT Pharmacologic Prophylaxis   Medication    warfarin (Coumadin) tab 5 mg    enoxaparin (Lovenox) 40 MG/0.4ML SUBQ injection 40 mg         DVT Pharmacologic prophylaxis: Aspirin 81 mg      Code Status: Full Code  Franks: No urinary catheter in place  Franks Duration (in days):   Central line:    LACHELLE: 3/25/2025    Discharge is dependent on:  progress  At this point Ms. Reed is expected to be discharge to: tbd    The 21st Century Cures Act makes medical notes like these available to patients in the interest of transparency. Please be advised this is a medical document. Medical documents are intended to carry relevant information, facts as evident, and the clinical opinion of the practitioner. The medical note is intended as peer to peer communication and may appear blunt or direct. It is written in medical language and may contain abbreviations or verbiage that are unfamiliar.              **Certification      PHYSICIAN Certification of Need for Inpatient Hospitalization - Initial Certification    Patient will require inpatient services that will reasonably be expected to span two midnight's based on the clinical documentation in H+P.   Based on patients current state of illness, I anticipate that, after discharge, patient will require TBD.

## 2025-03-23 NOTE — PLAN OF CARE
[facility] pt care at 1930  A&Ox4, able to make needs known  Aflutter/Afib on tele, -120, cards aware. One time dose digoxin given per order- HR went down to low 100s  Pain managed w/ Norco and tylenol  Voiding per roberto carlos  R vianey no change  Call light in reach  Bed in low position

## 2025-03-23 NOTE — PROGRESS NOTES
Progress Note  Kera Reed Patient Status:  Inpatient    1945 MRN [id number]   Location [facility] 7NE-A Attending John Hoffmann MD   Hosp Day # 3 PCP Daya Chandler MD     Subjective:  She is resting in bed. Moderate pain at incisional site. Denies chest pain. Denies shortness of breath.     Objective:  /67 (BP Location: Left arm)   Pulse 109   Temp 97.9 °F (36.6 °C) (Oral)   Resp 22   Ht 5' 2\" (1.575 m)   Wt 164 lb 14.5 oz (74.8 kg)   LMP  (LMP Unknown)   SpO2 90%   BMI 30.16 kg/m²     Intake/Output:    Intake/Output Summary (Last 24 hours) at 3/23/2025 0857  Last data filed at 3/23/2025 0400  Gross per 24 hour   Intake 240 ml   Output 1350 ml   Net -1110 ml       Last 3 Weights   25 0200 164 lb 14.5 oz (74.8 kg)   25 0600 164 lb 7.4 oz (74.6 kg)   25 1200 160 lb 11.5 oz (72.9 kg)   25 0608 158 lb 4.8 oz (71.8 kg)   25 1442 155 lb (70.3 kg)   25 0508 159 lb 9.8 oz (72.4 kg)   25 0500 162 lb 11.2 oz (73.8 kg)   25 1227 158 lb 4.6 oz (71.8 kg)   25 1025 158 lb 4.6 oz (71.8 kg)   25 1000 150 lb (68 kg)   01/10/25 1419 151 lb (68.5 kg)       Labs:  Recent Labs   Lab 25  1227 25  0500 25  0409 25  0448   * 109* 111*  --    BUN 24* 18 19  --    CREATSERUM 0.99 0.91 0.98  --    EGFRCR 57* 63 58*  --    CA 8.6* 8.6* 8.9  --     139 137  --    K 3.7 4.4  4.4 3.8 3.8    107 101  --    CO2 24.0 27.0 25.0  --      Recent Labs   Lab 25  1227 25  0500 25  0409   RBC 3.47* 2.99* 3.14*   HGB 11.2* 9.7* 10.1*   HCT 33.6* 28.8* 30.4*   MCV 96.8 96.3 96.8   MCH 32.3 32.4 32.2   MCHC 33.3 33.7 33.2   RDW 19.0 18.9 18.6   NEPRELIM  --   --  5.02   WBC 6.4 7.6 6.9   .0 199.0 185.0         No results for input(s): \"TROP\", \"TROPHS\", \"CK\" in the last 168 hours.    Diagnostics:   Telemetry: AFL rates trending 100-110 bpm    Review of Systems   Cardiovascular:  Negative for  chest pain.   Respiratory:  Negative for shortness of breath.        Physical Exam:  General: Alert and oriented in no apparent distress.  HEENT: Pupils equal. Mucous membranes moist.   Neck: No JVD  Cardiac:  Normal S1 S2, irreg irreg. Soft 1/6 systolic murmur  Lungs: Mildly diminished. No wheezing or crackles.  Abdomen: Soft, non-tender, ND  Extremities: 1+ bilateral LE edema, right fem dressing  Neurologic: No focal deficits. Normal affect.  Skin: Warm and dry,    Medications:   warfarin  5 mg Oral Nightly    metoprolol tartrate  25 mg Oral q12h    enoxaparin  40 mg Subcutaneous Nightly    aspirin  81 mg Oral Daily    clopidogrel  75 mg Oral Daily    pantoprazole  40 mg Intravenous QAM AC    Or    pantoprazole  40 mg Oral QAM AC    [Held by provider] metoprolol succinate ER  50 mg Oral Daily Beta Blocker    insulin aspart  1-10 Units Subcutaneous TID AC and HS    amiodarone  200 mg Oral Daily    bumetanide  1 mg Oral BID (Diuretic)    busPIRone  10 mg Oral BID    levothyroxine  75 mcg Oral Before breakfast    empagliflozin  10 mg Oral Daily         Assessment:    PAD s/p right iliofemoral endarterectomy, infra-renal aortic stent graft and right common iliac stent graft 3/20/25  On aspirin, plavix, coumadin. Vascular surgery following, distal pulses intact.   Persistent atrial fibrillation/flutter  AFL rates mildly elevated 100-110 bpm. Start lopressor 25 mg BID. Continue amiodarone.  History of DCCV 12/2024 with early recurrence AF. Has been started on PO amiodarone 200 mg daily with plan for repeat DCCV attempt in outpatient setting.  Anticoagulation with coumadin.  Chronic HF with improved LVEF, ischemic cardiomyopathy, RV dysfunction   Compensated. LVEF 50-55% (historically as low as 35-40% in past). RV function reduced with severe TR.  GDMT: Jardiance. Home ARB held with hypotension.  PO bumex 1 mg BID.   Severe TR  SH evaluated on 1/2025, follow-up planned in 6 months  CAD s/p CABG 2012, subsequent PCI  distal L main, ostial LAD 11/2024 - Denies angina. ASA, plavix, coumadin, BB.  Prior PE/DVT with factor V Leiden/APLS - on coumadin  HLD - not on statin due to patient preference per office notes    Plan:    Continue aspirin and plavix. Coumadin has been resumed, when INR therapeutic 2-3, plan to discontinue ASA at that time to avoid triple therapy.  AFL rates mildly elevated. Has not been getting metoprolol due to relative hypotension. Ok to start lopressor 25 mg BID. Continue amiodarone  Reasonably well compensated. Mild LE edema with RV dysfunction, continue home PO bumex.     Plan of care discussed with patient, RN.    KAYLYN Hayward  3/23/2025  8:57 AM    Note reviewed and labs reviewed.  Agree to the assessment and plan with the following additions/changes.  Entire medical decision making & plan performed by myself.    A/P:  S/P Rt Iliofemoral endarterectomy, infra-renal aortic & Rt SHANNON stent   Persistent Afib/flutter - HR remains fast  BB, amiodarone as above  Triple therapy. Plan to DC ASA eventually and leave on dual therapy    LOC L2    Hosea Villalpando MD  3/23/2025  Interventional Cardiology  [facility] [place]  =======================================================

## 2025-03-23 NOTE — PHYSICAL THERAPY NOTE
PHYSICAL THERAPY TREATMENT NOTE - INPATIENT    Room Number: 455/455-A     Session: 2     Number of Visits to Meet Established Goals: 5    Presenting Problem: R iliofemoral endarterectomy 3/20  Co-Morbidities : CAD, CABG, blood clotting disorder, h/o DVT/PE, AF, HF, HTN, HLD, anxiety, R foot gangrene    PHYSICAL THERAPY ASSESSMENT   Patient demonstrates fair progress this session, goals  remain in progress.      Patient is requiring contact guard assist, minimal assist, and moderate assist as a result of the following impairments: decreased functional strength, decreased endurance/aerobic capacity, pain, impaired standing balance, decreased muscular endurance, and medical status.     Patient continues to function below baseline with bed mobility, transfers, and gait.  Next session anticipate patient to progress bed mobility, transfers, and gait.  Physical Therapy will continue to follow patient for duration of hospitalization.    Patient continues to benefit from continued skilled PT services: at discharge to promote prior level of function and safety with additional support and return home with home health PT.    PLAN DURING HOSPITALIZATION  Nursing Mobility Recommendation : 1 Assist  PT Device Recommendation: Rolling walker  PT Treatment Plan: Bed mobility, Body mechanics, Endurance, Energy conservation, Patient education, Family education, Gait training, Range of motion, Strengthening, Stair training, Transfer training, Balance training  Frequency (Obs): 3-5x/week     CURRENT GOALS     Goal #1 Patient is able to demonstrate supine - sit EOB @ level: modified independent      Goal #2 Patient is able to demonstrate transfers EOB to/from BSC at assistance level: supervision      Goal #3 Patient is able to ambulate 50 feet with assist device: walker - rolling at assistance level: supervision      Goal #4     Goal #5     Goal #6     Goal Comments: Goals established on 3/21/2025  3/22/2025 all goals ongoing      PHYSICAL THERAPY MEDICAL/SOCIAL HISTORY  History related to current admission: Patient is a 80 year old female admitted on 3/20/2025 from home for planned R iliofemoral endarterectomy.       HOME SITUATION  Type of Home: House  Home Layout: Two level, Able to live on main level, Other (Comment) ([place])  Stairs to Enter : 1        Stairs to Bedroom: 0         Lives With: Spouse (needs knee surgery)    Drives: No   Patient Regularly Uses: Cane, Rolling walker (no device used at home; uses cane in community for short distances and RW for longer ones)       Prior Level of [place]: Pt typically is independent w/ adl's.  Uses a rolling walker for longer community distances, cane for shorter community distances and no device w/I her house.  Does not need to do stairs when she returns home.    SUBJECTIVE  Pt reports her leg \"hurts so much\" today. States pain is worse now than earlier today - RN aware    OBJECTIVE  Precautions: Other (Comment) (R groin incision, R upper arm hematoma)    WEIGHT BEARING RESTRICTION     PAIN ASSESSMENT   Ratin  Location: R LE  Management Techniques: Activity promotion, Repositioning, Breathing techniques    BALANCE                                                                                                                       Static Sitting: Good  Dynamic Sitting: Good           Static Standing: Fair  Dynamic Standing: Fair    ACTIVITY TOLERANCE; Poor due to pain     O2 WALK       AM-PAC '6-Clicks' INPATIENT SHORT FORM - BASIC MOBILITY  How much difficulty does the patient currently have...  Patient Difficulty: Turning over in bed (including adjusting bedclothes, sheets and blankets)?: A Lot   Patient Difficulty: Sitting down on and standing up from a chair with arms (e.g., wheelchair, bedside commode, etc.): A Little   Patient Difficulty: Moving from lying on back to sitting on the side of the bed?: A Lot   How much help from another person does the patient currently  need...   Help from Another: Moving to and from a bed to a chair (including a wheelchair)?: A Little   Help from Another: Need to walk in hospital room?: A Lot   Help from Another: Climbing 3-5 steps with a railing?: A Lot     AM-PAC Score:  Raw Score: 14   Approx Degree of Impairment: 61.29%   Standardized Score (AM-PAC Scale): 38.1   CMS Modifier (G-Code): CL    FUNCTIONAL ABILITY STATUS  Gait Assessment   Functional Mobility/Gait Assessment  Gait Assistance: Minimum assistance  Distance (ft): 10  Assistive Device: Rolling walker  Pattern:  (decrease stride length limited by pain)    Skilled Therapy Provided    Bed Mobility:   Supine<>Sit: mod A   Sit<>Supine: NT    Transfer Mobility:  Sit<>Stand: min/mod A A from bed < RW   Stand<>Sit: min A to commode; CGA to chair with cueing on approp hand placement   Gait: Pt ambulated 5' and walk back wards  RW and CGA, limited by pain on the R LE     Therapist's Comments:   Mobilities as above  Pain complaint on the R sx site limiting her mobilities  Initiated AROM on the L LE and AAROM on R LE limited by pain  Max/mod A in sup<sit on EOB  Encourage pt to scoot on EOB and repositioned  Gt training with RW as support with decrease stride length with extra time limited by pain  Left on the recliner and requested for Morphine    -122 when ambulating.      THERAPEUTIC EXERCISES  Lower Extremity Ankle pumps  Hip AB/AD  Heel slides  LAQ -L LE only     Upper Extremity      Position Supine     Repetitions   10   Sets   1     Patient End of Session: Up in chair, Needs met, Call light within reach, RN aware of session/findings, All patient questions and concerns addressed, Alarm set    PT Session Time:30 minutes  Gait Training:10 minutes  Therapeutic Activity: 15minutes  Therapeutic Exercise: 5 minutes

## 2025-03-24 LAB
BLOOD TYPE BARCODE: 1700
GLUCOSE BLD-MCNC: 101 MG/DL (ref 70–99)
GLUCOSE BLD-MCNC: 119 MG/DL (ref 70–99)
GLUCOSE BLD-MCNC: 132 MG/DL (ref 70–99)
GLUCOSE BLD-MCNC: 138 MG/DL (ref 70–99)
INR BLD: 1.52 (ref 0.8–1.2)
POTASSIUM SERPL-SCNC: 3.6 MMOL/L (ref 3.5–5.1)
PROTHROMBIN TIME: 18.4 SECONDS (ref 11.6–14.8)
UNIT VOLUME: 350 ML

## 2025-03-24 PROCEDURE — 99232 SBSQ HOSP IP/OBS MODERATE 35: CPT | Performed by: HOSPITALIST

## 2025-03-24 RX ORDER — LOSARTAN POTASSIUM 25 MG/1
12.5 TABLET ORAL DAILY
Status: DISCONTINUED | OUTPATIENT
Start: 2025-03-24 | End: 2025-03-25

## 2025-03-24 RX ORDER — WARFARIN SODIUM 5 MG/1
5 TABLET ORAL NIGHTLY
Status: DISCONTINUED | OUTPATIENT
Start: 2025-03-24 | End: 2025-03-26

## 2025-03-24 NOTE — PLAN OF CARE
Received patient awake and alert x 4.   O2 protocol: On room air, clear lungs, sats 97%. Denies shortness of breath.  Aflutter on telemetry monitoring. Denies chest pain.   Cardiac electrolyte protocol  Coumadin and Lovenox for VTE prophylaxis.  Pain management w/ Norco w/ good relief.   Saline locked.   Voiding adequately, clear yellow urine.  Fall precaution, bed alarm on, call light and bedside table within reach.    Up x 2 w/ a walker.     Problem: PAIN - ADULT  Goal: Verbalizes/displays adequate comfort level or patient's stated pain goal  Description: INTERVENTIONS:- Encourage pt to monitor pain and request assistance- Assess pain using appropriate pain scale- Administer analgesics based on type and severity of pain and evaluate response- Implement non-pharmacological measures as appropriate and evaluate response- Consider cultural and social influences on pain and pain management- Manage/alleviate anxiety- Utilize distraction and/or relaxation techniques- Monitor for opioid side effects- Notify MD/LIP if interventions unsuccessful or patient reports new pain- Anticipate increased pain with activity and pre-medicate as appropriate  Outcome: Progressing     Problem: RISK FOR INFECTION - ADULT  Goal: Absence of fever/infection during anticipated neutropenic period  Description: INTERVENTIONS- Monitor WBC- Administer growth factors as ordered- Implement neutropenic guidelines  Outcome: Progressing     Problem: SAFETY ADULT - FALL  Goal: Free from fall injury  Description: INTERVENTIONS:- Assess pt frequently for physical needs- Identify cognitive and physical deficits and behaviors that affect risk of falls.- Mathews fall precautions as indicated by assessment.- Educate pt/family on patient safety including physical limitations- Instruct pt to call for assistance with activity based on assessment- Modify environment to reduce risk of injury- Provide assistive devices as appropriate- Consider OT/PT consult to  assist with strengthening/mobility- Encourage toileting schedule  Outcome: Progressing     Problem: DISCHARGE PLANNING  Goal: Discharge to home or other facility with appropriate resources  Description: INTERVENTIONS:- Identify barriers to discharge w/pt and caregiver- Include patient/family/discharge partner in discharge planning- Arrange for needed discharge resources and transportation as appropriate- Identify discharge learning needs (meds, wound care, etc)- Arrange for interpreters to assist at discharge as needed- Consider post-discharge preferences of patient/family/discharge partner- Complete POLST form as appropriate- Assess patient's ability to be responsible for managing their own health- Refer to Case Management Department for coordinating discharge planning if the patient needs post-hospital services based on physician/LIP order or complex needs related to functional status, cognitive ability or social support system  Outcome: Progressing     Problem: CARDIOVASCULAR - ADULT  Goal: Absence of cardiac arrhythmias or at baseline  Description: INTERVENTIONS:- Continuous cardiac monitoring, monitor vital signs, obtain 12 lead EKG if indicated- Evaluate effectiveness of antiarrhythmic and heart rate control medications as ordered- Initiate emergency measures for life threatening arrhythmias- Monitor electrolytes and administer replacement therapy as ordered  Outcome: Progressing     Problem: METABOLIC/FLUID AND ELECTROLYTES - ADULT  Goal: Glucose maintained within prescribed range  Description: INTERVENTIONS:- Monitor Blood Glucose as ordered- Assess for signs and symptoms of hyperglycemia and hypoglycemia- Administer ordered medications to maintain glucose within target range- Assess barriers to adequate nutritional intake and initiate nutrition consult as needed- Instruct patient on self management of diabetes  Outcome: Progressing     Problem: SKIN/TISSUE INTEGRITY - ADULT  Goal: Incision(s), wounds(s) or  drain site(s) healing without S/S of infection  Description: INTERVENTIONS:- Assess and document risk factors for pressure ulcer development- Assess and document skin integrity- Assess and document dressing/incision, wound bed, drain sites and surrounding tissue- Implement wound care per orders- Initiate isolation precautions as appropriate- Initiate Pressure Ulcer prevention bundle as indicated  Outcome: Progressing

## 2025-03-24 NOTE — PROGRESS NOTES
[facility]   part of [facility]     Hospitalist Progress Note     Kera Reed Patient Status:  Inpatient    1945 MRN [id number]   Location [facility] 4SW-A Attending John Hoffmann MD   Hosp Day # 4 PCP Daya Chandler MD     Chief Complaint: Medical management    Subjective:     Patient doing well overall, some pain, no cp, sob.    Objective:    Review of Systems:   A comprehensive review of systems was completed; pertinent positive and negatives stated in subjective.    Vital signs:  Temp:  [97.2 °F (36.2 °C)-98.1 °F (36.7 °C)] 98.1 °F (36.7 °C)  Pulse:  [] 93  Resp:  [12-20] 20  BP: (109-116)/(74-84) 113/81  SpO2:  [92 %-96 %] 93 %    Physical Exam:    General: No acute distress  Respiratory: No wheezes, no rhonchi  Cardiovascular: S1, S2, regular rate and rhythm  Abdomen: Soft, Non-tender, non-distended, positive bowel sounds  Neuro: No new focal deficits.   Extremities: No edema      Diagnostic Data:    Labs:  Recent Labs   Lab 25  1227 25  0500 25  1446 25  0409 25  0510 25  0516   WBC 6.4 7.6  --  6.9  --   --    HGB 11.2* 9.7*  --  10.1*  --   --    MCV 96.8 96.3  --  96.8  --   --    .0 199.0  --  185.0  --   --    INR  --  1.29* 1.19 1.25* 1.32* 1.52*       Recent Labs   Lab 25  1227 25  0500 25  0409 25  0448 25  0516   * 109* 111*  --   --    BUN 24* 18 19  --   --    CREATSERUM 0.99 0.91 0.98  --   --    CA 8.6* 8.6* 8.9  --   --    ALB 3.5  --  3.5  --   --     139 137  --   --    K 3.7 4.4  4.4 3.8 3.8 3.6    107 101  --   --    CO2 24.0 27.0 25.0  --   --    ALKPHO 84  --  70  --   --    AST 19  --  13  --   --    ALT 16  --  <7*  --   --    BILT 0.9  --  0.8  --   --    TP 5.5*  --  5.5*  --   --        Estimated Glomerular Filtration Rate: 58 mL/min/1.73m2 (A) (result from lab).    No results for input(s): \"TROP\", \"TROPHS\", \"CK\" in the last 168 hours.    Recent Labs   Lab  03/22/25  0409 03/23/25  0510 03/24/25  0516   PTP 15.8* 16.5* 18.4*   INR 1.25* 1.32* 1.52*                  Microbiology    No results found for this visit on 03/20/25.      Imaging: Reviewed in Epic.    Medications:    warfarin  5 mg Oral Nightly    losartan  12.5 mg Oral Daily    metoprolol tartrate  25 mg Oral q12h    enoxaparin  40 mg Subcutaneous Nightly    aspirin  81 mg Oral Daily    clopidogrel  75 mg Oral Daily    pantoprazole  40 mg Intravenous QAM AC    Or    pantoprazole  40 mg Oral QAM AC    insulin aspart  1-10 Units Subcutaneous TID AC and HS    amiodarone  200 mg Oral Daily    bumetanide  1 mg Oral BID (Diuretic)    busPIRone  10 mg Oral BID    levothyroxine  75 mcg Oral Before breakfast    empagliflozin  10 mg Oral Daily       Assessment & Plan:      #Infrarenal aortic stenosis, iliac stenosis, stenosis of right common femoral artery s/p stent graft placement of the abdominal aorta, right common iliac and endarterectomy  Per cardiology, per vascular surgery  Cont ASA, plavix until INR therapeutic, then dc ASA  Excellent doppler flow     #h/o DVT/PE, possible Factor V Leiden  On coumadin which is on hold  Coumadin resumed, dose increased, INR 1.5     #CHF, compensated, resume GDMT     #DMII, hyperglycemia protocol  A1c 6.8     #CAD with prev CABG 2012, PCI in 11/2024     #HTN, controlled     #DL, not on a statin    #Hypothyroidism, Synthroid    #A fib, persistent, on Amiodarone, Metoprolol, and Coumadin   BB resumed     #COPD, stable      #Anxiety/depression, resume PTA meds, Xanax, Buspirone    #Obesity, BMI 30    #Dispo, dc planning, PT to re-eval      Anabela Najera MD    Supplementary Documentation:     Quality:  DVT Mechanical Prophylaxis:        DVT Pharmacologic Prophylaxis   Medication    warfarin (Coumadin) tab 5 mg    enoxaparin (Lovenox) 40 MG/0.4ML SUBQ injection 40 mg         DVT Pharmacologic prophylaxis: Aspirin 81 mg      Code Status: Full Code  Franks: External urinary catheter in  place  Franks Duration (in days):   Central line:    LACHELLE: 3/25/2025    Discharge is dependent on: progress  At this point Ms. Reed is expected to be discharge to: tbd    The 21st Century Cures Act makes medical notes like these available to patients in the interest of transparency. Please be advised this is a medical document. Medical documents are intended to carry relevant information, facts as evident, and the clinical opinion of the practitioner. The medical note is intended as peer to peer communication and may appear blunt or direct. It is written in medical language and may contain abbreviations or verbiage that are unfamiliar.              **Certification      PHYSICIAN Certification of Need for Inpatient Hospitalization - Initial Certification    Patient will require inpatient services that will reasonably be expected to span two midnight's based on the clinical documentation in H+P.   Based on patients current state of illness, I anticipate that, after discharge, patient will require TBD.

## 2025-03-24 NOTE — PLAN OF CARE
Assumed care at 0700. Pt A/O x4. RA. Aflutter on tele. VSS.  PRN Norco for pain. Up x2 with the walker.   Up to the chair majority of the day.  PT/OT recc home with HH.  Pt updated on POC. Call light within reach.

## 2025-03-24 NOTE — PROGRESS NOTES
No complaints except incisional pain. Wound clean/ dry. Neuro intact. INR is 1.5- continue Coumadin/ Social Service for discharge planning- Continue PT/ Patent repair

## 2025-03-24 NOTE — CM/SW NOTE
SW consulted for DC planning, per order pt may benefit from rehab at [place]. SW met with pt at bedside. Pt says today her pain was not managed over the weekend and is hesitant on discharging home. Reviewed therapy notes from yesterday who is recommending home with home health care at [place]. Pt has Medicare primary, therefore could use benefit for short term rehab. Referrals sent and will review DC options with pt and her spouse once here at hospital.     Updates to follow.    GENIA Murdock

## 2025-03-24 NOTE — PROGRESS NOTES
Progress Note  Kera Reed Patient Status:  Inpatient    1945 MRN [id number]   Location [facility] 7NE-A Attending John Hoffmann MD   Hosp Day # 4 PCP Daya Chandler MD     Subjective:  Has not gotten out of bed yet today. Right groin not painful    Objective:  /84 (BP Location: Left arm)   Pulse 97   Temp 97.8 °F (36.6 °C) (Temporal)   Resp 17   Ht 5' 2\" (1.575 m)   Wt 164 lb 14.5 oz (74.8 kg)   LMP  (LMP Unknown)   SpO2 96%   BMI 30.16 kg/m²     Telemetry: afib, flutter, RBBB      Intake/Output: -2409    Intake/Output Summary (Last 24 hours) at 3/24/2025 0705  Last data filed at 3/24/2025 0600  Gross per 24 hour   Intake --   Output 1900 ml   Net -1900 ml       Last 3 Weights   25 0200 164 lb 14.5 oz (74.8 kg)   25 0600 164 lb 7.4 oz (74.6 kg)   25 1200 160 lb 11.5 oz (72.9 kg)   25 0608 158 lb 4.8 oz (71.8 kg)   25 1442 155 lb (70.3 kg)   25 0508 159 lb 9.8 oz (72.4 kg)   25 0500 162 lb 11.2 oz (73.8 kg)   25 1227 158 lb 4.6 oz (71.8 kg)   25 1025 158 lb 4.6 oz (71.8 kg)   25 1000 150 lb (68 kg)   01/10/25 1419 151 lb (68.5 kg)       Labs:  Recent Labs   Lab 25  1227 25  0500 25  0409 25  0448 25  0516   * 109* 111*  --   --    BUN 24* 18 19  --   --    CREATSERUM 0.99 0.91 0.98  --   --    EGFRCR 57* 63 58*  --   --    CA 8.6* 8.6* 8.9  --   --    ALB 3.5  --  3.5  --   --     139 137  --   --    K 3.7 4.4  4.4 3.8 3.8 3.6    107 101  --   --    CO2 24.0 27.0 25.0  --   --    ALKPHO 84  --  70  --   --    AST 19  --  13  --   --    ALT 16  --  <7*  --   --    BILT 0.9  --  0.8  --   --    TP 5.5*  --  5.5*  --   --      Recent Labs   Lab 25  1227 25  0500 25  0409   RBC 3.47* 2.99* 3.14*   HGB 11.2* 9.7* 10.1*   HCT 33.6* 28.8* 30.4*   MCV 96.8 96.3 96.8   MCH 32.3 32.4 32.2   MCHC 33.3 33.7 33.2   RDW 19.0 18.9 18.6   NEPRELIM  --   --  5.02   WBC  6.4 7.6 6.9   .0 199.0 185.0         No results for input(s): \"TROP\", \"TROPHS\", \"CK\" in the last 168 hours.  Lab Results   Component Value Date    INR 1.52 (H) 03/24/2025    INR 1.32 (H) 03/23/2025    INR 1.25 (H) 03/22/2025       Diagnostics:     Review of Systems   Constitutional: Negative.   Cardiovascular:  Positive for irregular heartbeat.   Respiratory: Negative.         Physical Exam:    Gen: Alert, oriented x 3, in no apparent distress  Heent: Pupils equal, reactive. Mucous membranes moist.   Cardiac: Regular rate and rhythm, normal S1,S2  Lungs: Clear to auscultation  Abd: Soft, non tender, non distended  Ext: No edema  Skin: Warm, dry  Neuro: No focal deficits  Right groin dressing clean and dry      Medications:     warfarin  5 mg Oral Nightly    metoprolol tartrate  25 mg Oral q12h    enoxaparin  40 mg Subcutaneous Nightly    aspirin  81 mg Oral Daily    clopidogrel  75 mg Oral Daily    pantoprazole  40 mg Intravenous QAM AC    Or    pantoprazole  40 mg Oral QAM AC    [Held by provider] metoprolol succinate ER  50 mg Oral Daily Beta Blocker    insulin aspart  1-10 Units Subcutaneous TID AC and HS    amiodarone  200 mg Oral Daily    bumetanide  1 mg Oral BID (Diuretic)    busPIRone  10 mg Oral BID    levothyroxine  75 mcg Oral Before breakfast    empagliflozin  10 mg Oral Daily             Assessment:  PAD s/p right iliofemoral endarterectomy, infra-renal aortic stent graft and right common iliac stent graft 3/20/25  On aspirin, plavix, coumadin. INR 1.52   Vascular surgery following  Persistent atrial fibrillation/flutter  AFL rates 90's   On lopressor 25 mg PO BID. Continue amiodarone.  History of DCCV 12/2024 with early recurrence AF. Has been started on PO amiodarone 200 mg daily with plan for repeat DCCV attempt in outpatient setting.  Anticoagulation with coumadin. INR 1.52  Chronic HF with improved LVEF, ischemic cardiomyopathy, RV dysfunction   Compensated. LVEF 50-55% (historically as low  as 35-40% in past). RV function reduced with severe TR.  GDMT: Jardiance. Home ARB held with hypotension.  PO bumex 1 mg BID.   Severe TR  Structural heart evaluated on 1/2025, follow-up planned in 6 months  CAD s/p CABG 2012, subsequent PCI distal L main, ostial LAD 11/2024 - Denies angina. ASA, plavix, coumadin, BB.  Prior PE/DVT with factor V Leiden/APLS - on coumadin  HLD - not on statin due to patient preference per office notes    Plan:  Continue BB,amiodarone and bumex  On ASA, Plavix and coumadin. Currently on triple therapy, plans to DC ASA in the future. Pharmacy is managing coumadin.  Discharge planning per Dr. Hoffmann  Start low dose ARb    Plan of care discussed with patient, RN.    JANET Sloan  3/24/2025  7:05 AM    Patient seen and examined independently.  Note reviewed and labs reviewed. Agree with above assessment and plan.    A Fib with RVR, rate controlled  Hx of CAD s/p CABG, PCI  PAD s/p right iliofemoral endarterectomy, infra-renal aortic stent graft and right common iliac stent graft   Incisional Pain    Recommendations  Continue amiodarone, BB, bumex for now  Encourage ambulation  Monitor INR  Remains stable from cardiology standpoint    Deena Gomez MD  Cardiologist  [facility] [place]  3/24/2025 9:47 AM      Note to the patient: The 21st Century Cures Act makes medical notes like these available to patients in the interest of transparency. However, be advised that this is a medical document. It is intended as peer to peer communication. It is written in medical language and may contain abbreviations or verbiage that are unfamiliar. It may appear blunt or direct. Medical documents are intended to carry relevant information, facts as evident, and clinical opinion of the practitioner.     Disclaimer: Components of this note were documented using voice recognition system and are subject to errors not corrected at proofreading. Contact the author of this note for any  clarifications.

## 2025-03-25 LAB
BASOPHILS # BLD AUTO: 0.04 X10(3) UL (ref 0–0.2)
BASOPHILS NFR BLD AUTO: 0.7 %
EOSINOPHIL # BLD AUTO: 0.09 X10(3) UL (ref 0–0.7)
EOSINOPHIL NFR BLD AUTO: 1.6 %
ERYTHROCYTE [DISTWIDTH] IN BLOOD BY AUTOMATED COUNT: 17.2 %
GLUCOSE BLD-MCNC: 104 MG/DL (ref 70–99)
GLUCOSE BLD-MCNC: 117 MG/DL (ref 70–99)
GLUCOSE BLD-MCNC: 136 MG/DL (ref 70–99)
GLUCOSE BLD-MCNC: 141 MG/DL (ref 70–99)
HCT VFR BLD AUTO: 33.7 %
HGB BLD-MCNC: 11.2 G/DL
IMM GRANULOCYTES # BLD AUTO: 0.24 X10(3) UL (ref 0–1)
IMM GRANULOCYTES NFR BLD: 4.3 %
INR BLD: 1.95 (ref 0.8–1.2)
LYMPHOCYTES # BLD AUTO: 0.85 X10(3) UL (ref 1–4)
LYMPHOCYTES NFR BLD AUTO: 15.2 %
MCH RBC QN AUTO: 31.7 PG (ref 26–34)
MCHC RBC AUTO-ENTMCNC: 33.2 G/DL (ref 31–37)
MCV RBC AUTO: 95.5 FL
MONOCYTES # BLD AUTO: 0.63 X10(3) UL (ref 0.1–1)
MONOCYTES NFR BLD AUTO: 11.3 %
NEUTROPHILS # BLD AUTO: 3.74 X10 (3) UL (ref 1.5–7.7)
NEUTROPHILS # BLD AUTO: 3.74 X10(3) UL (ref 1.5–7.7)
NEUTROPHILS NFR BLD AUTO: 66.9 %
PLATELET # BLD AUTO: 265 10(3)UL (ref 150–450)
PROTHROMBIN TIME: 22.4 SECONDS (ref 11.6–14.8)
RBC # BLD AUTO: 3.53 X10(6)UL
WBC # BLD AUTO: 5.6 X10(3) UL (ref 4–11)

## 2025-03-25 PROCEDURE — 99233 SBSQ HOSP IP/OBS HIGH 50: CPT | Performed by: HOSPITALIST

## 2025-03-25 RX ORDER — HYDROCODONE BITARTRATE AND ACETAMINOPHEN 5; 325 MG/1; MG/1
1 TABLET ORAL EVERY 8 HOURS PRN
Qty: 30 TABLET | Refills: 0 | Status: SHIPPED | OUTPATIENT
Start: 2025-03-25

## 2025-03-25 RX ORDER — ALPRAZOLAM 0.25 MG
0.25 TABLET ORAL
Qty: 20 TABLET | Refills: 0 | Status: SHIPPED | OUTPATIENT
Start: 2025-03-25

## 2025-03-25 RX ORDER — METOPROLOL SUCCINATE 50 MG/1
50 TABLET, EXTENDED RELEASE ORAL
Status: DISCONTINUED | OUTPATIENT
Start: 2025-03-25 | End: 2025-03-26

## 2025-03-25 NOTE — DISCHARGE INSTRUCTIONS
No shower for 10 days post-op. Paint right groin incision beyadine q day/ Cover dry gauze./ TAGADERM. No underwear/ constricting garments on right groin incision. No driving 14 days. No lifting more than 3 pounds for 6 weeks. Call office for temperature> 100.5/ wound drainage. No urine/ stool on incisions

## 2025-03-25 NOTE — PROGRESS NOTES
[facility] Progress Note    Kera STALLWORTH Brianna Patient Status:  Inpatient    1945 MRN [id number]   Location [facility] 7NE-A Attending John Hoffmann MD   Hosp Day # 5 PCP Daya Chandler MD     Subjective:  No acute events overnight  Looks good, is up and walking to bathroom at this moment; no distress     Objective:  BP 91/63 (BP Location: Left arm)   Pulse 93   Temp 97.5 °F (36.4 °C) (Oral)   Resp (!) 28   Ht 5' 2\" (1.575 m)   Wt 164 lb 14.5 oz (74.8 kg)   LMP  (LMP Unknown)   SpO2 (!) 88%   BMI 30.16 kg/m²     Telemetry: afib      Intake/Output:    Intake/Output Summary (Last 24 hours) at 3/25/2025 1005  Last data filed at 3/25/2025 0815  Gross per 24 hour   Intake 1200 ml   Output 2900 ml   Net -1700 ml       Last 3 Weights   25 0200 164 lb 14.5 oz (74.8 kg)   25 0600 164 lb 7.4 oz (74.6 kg)   25 1200 160 lb 11.5 oz (72.9 kg)   25 0608 158 lb 4.8 oz (71.8 kg)   25 1442 155 lb (70.3 kg)   25 0508 159 lb 9.8 oz (72.4 kg)   25 0500 162 lb 11.2 oz (73.8 kg)   25 1227 158 lb 4.6 oz (71.8 kg)   25 1025 158 lb 4.6 oz (71.8 kg)   25 1000 150 lb (68 kg)   01/10/25 1419 151 lb (68.5 kg)       Labs:  Recent Labs   Lab 25  1227 25  0500 25  0409 25  0448 25  0516   * 109* 111*  --   --    BUN 24* 18 19  --   --    CREATSERUM 0.99 0.91 0.98  --   --    EGFRCR 57* 63 58*  --   --    CA 8.6* 8.6* 8.9  --   --     139 137  --   --    K 3.7 4.4  4.4 3.8 3.8 3.6    107 101  --   --    CO2 24.0 27.0 25.0  --   --      Recent Labs   Lab 25  0500 25  0409 25  0542   RBC 2.99* 3.14* 3.53*   HGB 9.7* 10.1* 11.2*   HCT 28.8* 30.4* 33.7*   MCV 96.3 96.8 95.5   MCH 32.4 32.2 31.7   MCHC 33.7 33.2 33.2   RDW 18.9 18.6 17.2   NEPRELIM  --  5.02 3.74   WBC 7.6 6.9 5.6   .0 185.0 265.0     Lab Results   Component Value Date    INR 1.95 (H) 2025    INR 1.52 (H)  03/24/2025    INR 1.32 (H) 03/23/2025        No results for input(s): \"TROP\", \"TROPHS\", \"CK\" in the last 168 hours.    Diagnostics:             Physical Exam:    Physical Exam  Cardiovascular:      Rate and Rhythm: Normal rate. Rhythm irregular.   Pulmonary:      Effort: Pulmonary effort is normal.   Neurological:      Mental Status: She is alert and oriented to person, place, and time.         Medications:   warfarin  5 mg Oral Nightly    losartan  12.5 mg Oral Daily    metoprolol tartrate  25 mg Oral q12h    enoxaparin  40 mg Subcutaneous Nightly    aspirin  81 mg Oral Daily    clopidogrel  75 mg Oral Daily    pantoprazole  40 mg Intravenous QAM AC    Or    pantoprazole  40 mg Oral QAM AC    insulin aspart  1-10 Units Subcutaneous TID AC and HS    amiodarone  200 mg Oral Daily    bumetanide  1 mg Oral BID (Diuretic)    busPIRone  10 mg Oral BID    levothyroxine  75 mcg Oral Before breakfast    empagliflozin  10 mg Oral Daily         Assessment:    Persistent Afib   S/p DCCV 12/2024 with reversion back to Afib  Amio started 2/2025 with plans for future DCCV  Rate control at present,( on lower dose Metoprolol post amio load )  Rate control for now with plans for ablation once other medical issues recovered.     PVD  S/p elective stent graft of abdominal aorta and R common iliac  S/p R iliofemoral, profunda, and superficial femoral endarderectomy  by Dr. Hoffmann 3/20/25  CAD   s/p cabg 2012, s/p PCI LM/LAD 11/2024  No angina  Asa/plavix  Stop aspirin once INR therapeutic on coumadin   Chronic HFimpEF with RH failure and severe TR  EF 50-55% this admission   Compensated, euvolemic  GDMT: was on high dose toprol xl prior to admission, now rate controlled on Metoprolol tartrate; Losartan, Empagliflozin; not on MRA prior to admission    Severe TR  Following in structural heart, seen 1/2025 with follow up plan in June/July   Factor V Lieden/APLS with h/o PE/DVT  Warfarin     Plan:    INR 1.95 on warfarin, pharmacy  dosing  Stop aspirin once INR > 2  Will review rhythm strategy/use of amiodarone with physician, as plans for DCCV have been delayed given other medical issues and now with interrupted anticoagulation.   Change Metoprolol tartrate back to prior to admission Toprol xl 50mg daily   DC planning when ok with Dr. britney Smith, APRN  3/25/2025  10:05 AM  Ph [phone] ([place]  Ph [phone] ([place])        Patient seen and examined independently.  Note reviewed and labs reviewed. Agree with above assessment and plan.    Persistent atrial fibrillation  Peripheral vascular disease status post stent graft to the abdominal aorta and right common iliac artery  Coronary disease    Recommendations  Continue amiodarone.  Continue anticoagulation with goal INR 2-3  Continue beta-blocker  Okay from cardiac standpoint to be discharged    Deena Gomez MD  Cardiologist  [facility]  3/25/2025 12:36 PM      Note to the patient: The 21st Century Cures Act makes medical notes like these available to patients in the interest of transparency. However, be advised that this is a medical document. It is intended as peer to peer communication. It is written in medical language and may contain abbreviations or verbiage that are unfamiliar. It may appear blunt or direct. Medical documents are intended to carry relevant information, facts as evident, and clinical opinion of the practitioner.     Disclaimer: Components of this note were documented using voice recognition system and are subject to errors not corrected at proofreading. Contact the author of this note for any clarifications.

## 2025-03-25 NOTE — PLAN OF CARE
Assumed care at 0730  A/O x4  2 L when sleeping. RA when awake  PRN pain meds  PRN xanax  UP x1 w/ walker   R groin C/D/I. Dressing changed with Dr. Hoffmann  Plan for pt to go to [facility] tomorrow    Call light within reach. Fall precautions in place  Pt and spouse updated on POC. All questions answered

## 2025-03-25 NOTE — PROGRESS NOTES
[facility]   part of [facility]     Hospitalist Progress Note     Kera Reed Patient Status:  Inpatient    1945 MRN [id number]   Location [facility] 4SW-A Attending John Hoffmann MD   Hosp Day # 5 PCP Daya Chandler MD     Chief Complaint: Medical management    Subjective:     Patient doing well overall, still with some pain.    Objective:    Review of Systems:   A comprehensive review of systems was completed; pertinent positive and negatives stated in subjective.    Vital signs:  Temp:  [97.5 °F (36.4 °C)-98.7 °F (37.1 °C)] 97.5 °F (36.4 °C)  Pulse:  [80-97] 80  Resp:  [11-22] 17  BP: ()/(63-85) 95/76  SpO2:  [91 %-96 %] 93 %    Physical Exam:    General: No acute distress  Respiratory: No wheezes, no rhonchi  Cardiovascular: S1, S2, regular rate and rhythm  Abdomen: Soft, Non-tender, non-distended, positive bowel sounds  Neuro: No new focal deficits.   Extremities: No edema      Diagnostic Data:    Labs:  Recent Labs   Lab 25  1227 25  0500 25  1446 25  0409 25  0510 25  0516 25  0542   WBC 6.4 7.6  --  6.9  --   --  5.6   HGB 11.2* 9.7*  --  10.1*  --   --  11.2*   MCV 96.8 96.3  --  96.8  --   --  95.5   .0 199.0  --  185.0  --   --  265.0   INR  --  1.29* 1.19 1.25* 1.32* 1.52* 1.95*       Recent Labs   Lab 25  1227 25  0500 25  0409 25  0448 25  0516   * 109* 111*  --   --    BUN 24* 18 19  --   --    CREATSERUM 0.99 0.91 0.98  --   --    CA 8.6* 8.6* 8.9  --   --    ALB 3.5  --  3.5  --   --     139 137  --   --    K 3.7 4.4  4.4 3.8 3.8 3.6    107 101  --   --    CO2 24.0 27.0 25.0  --   --    ALKPHO 84  --  70  --   --    AST 19  --  13  --   --    ALT 16  --  <7*  --   --    BILT 0.9  --  0.8  --   --    TP 5.5*  --  5.5*  --   --        Estimated Glomerular Filtration Rate: 58 mL/min/1.73m2 (A) (result from lab).    No results for input(s): \"TROP\", \"TROPHS\", \"CK\" in the  last 168 hours.    Recent Labs   Lab 03/23/25  0510 03/24/25  0516 03/25/25  0542   PTP 16.5* 18.4* 22.4*   INR 1.32* 1.52* 1.95*                  Microbiology    No results found for this visit on 03/20/25.      Imaging: Reviewed in Epic.    Medications:    metoprolol succinate ER  50 mg Oral Daily Beta Blocker    warfarin  5 mg Oral Nightly    losartan  12.5 mg Oral Daily    enoxaparin  40 mg Subcutaneous Nightly    aspirin  81 mg Oral Daily    clopidogrel  75 mg Oral Daily    pantoprazole  40 mg Intravenous QAM AC    Or    pantoprazole  40 mg Oral QAM AC    insulin aspart  1-10 Units Subcutaneous TID AC and HS    amiodarone  200 mg Oral Daily    bumetanide  1 mg Oral BID (Diuretic)    busPIRone  10 mg Oral BID    levothyroxine  75 mcg Oral Before breakfast    empagliflozin  10 mg Oral Daily       Assessment & Plan:      #Infrarenal aortic stenosis, iliac stenosis, stenosis of right common femoral artery s/p stent graft placement of the abdominal aorta, right common iliac and endarterectomy  Per cardiology, per vascular surgery  Cont ASA, plavix until INR therapeutic, then dc ASA  Excellent doppler flow     #h/o DVT/PE, possible Factor V Leiden  On coumadin which is on hold  Coumadin resumed, dose increased, INR 1.95     #CHF, compensated, resume GDMT     #DMII, hyperglycemia protocol  A1c 6.8     #CAD with prev CABG 2012, PCI in 11/2024     #HTN, controlled     #DL, not on a statin    #Hypothyroidism, Synthroid    #A fib, persistent, on Amiodarone, Metoprolol, and Coumadin   BB resumed     #COPD, stable      #Anxiety/depression, resume PTA meds, Xanax, Buspirone    #Obesity, BMI 30    #Dispo, dc planning, PT to re-eval      Anabela Najera MD    Supplementary Documentation:     Quality:  DVT Mechanical Prophylaxis:        DVT Pharmacologic Prophylaxis   Medication    warfarin (Coumadin) tab 5 mg    enoxaparin (Lovenox) 40 MG/0.4ML SUBQ injection 40 mg         DVT Pharmacologic prophylaxis: Aspirin 81 mg      Code  Status: Full Code  Franks: External urinary catheter in place  Franks Duration (in days):   Central line:    LACHELLE: 3/25/2025    Discharge is dependent on: progress  At this point Ms. Reed is expected to be discharge to: tbd    The 21st Century Cures Act makes medical notes like these available to patients in the interest of transparency. Please be advised this is a medical document. Medical documents are intended to carry relevant information, facts as evident, and the clinical opinion of the practitioner. The medical note is intended as peer to peer communication and may appear blunt or direct. It is written in medical language and may contain abbreviations or verbiage that are unfamiliar.              **Certification      PHYSICIAN Certification of Need for Inpatient Hospitalization - Initial Certification    Patient will require inpatient services that will reasonably be expected to span two midnight's based on the clinical documentation in H+P.   Based on patients current state of illness, I anticipate that, after discharge, patient will require TBD.

## 2025-03-25 NOTE — OCCUPATIONAL THERAPY NOTE
OCCUPATIONAL THERAPY TREATMENT NOTE - INPATIENT     Room Number: 7601/7601-A  Session: 1   Number of Visits to Meet Established Goals: 2    Presenting Problem: R ileofemoral endarterectomy with aortic and iliac stent graft    ASSESSMENT   Patient demonstrates good  progress this session, goals remain in progress.    Patient continues to function near baseline with toileting, upper body dressing, lower body dressing, grooming, bed mobility, transfers, static sitting balance, dynamic sitting balance, static standing balance, dynamic standing balance, maintaining seated position, and functional standing tolerance.   Contributing factors to remaining limitations include decreased functional strength, decreased functional reach, decreased endurance, impaired coordination, impaired motor planning, and decreased muscular endurance.  Next session anticipate patient to progress toileting, upper body dressing, lower body dressing, grooming, bed mobility, transfers, static sitting balance, dynamic sitting balance, static standing balance, dynamic standing balance, maintaining seated position, functional standing tolerance, and energy conservation strategies.  Occupational Therapy will continue to follow patient for duration of hospitalization.    Patient continues to benefit from continued skilled OT services: at discharge to promote prior level of function and safety with additional support and return home with home health OT.     History: History related to current admission: Patient is a 80 year old female admitted on 3/20/2025 from home for planned R iliofemoral endarterectomy.     WEIGHT BEARING RESTRICTION       Recommendations for nursing staff:   Transfers: Sup  Toileting location: toilet    TREATMENT SESSION:  Patient Start of Session: supine in bed for session    FUNCTIONAL TRANSFER ASSESSMENT  Sit to Stand: Edge of Bed  Edge of Bed: Supervision  Chair: Not Tested  Toilet Transfer: Supervision (simulated from low  bed, pt declined to go into bathroom)    BED MOBILITY  Rolling: Supervision  Supine to Sit : Supervision  Sit to Supine (OT): Supervision  Scooting: Sup to EOB    BALANCE ASSESSMENT  Static Sitting: Supervision  Static Standing: Supervision (to stand and amb in room)    FUNCTIONAL ADL ASSESSMENT  UB Dressing Seated: Supervision (for robe on back)  LB Dressing Seated: Minimal Assist (for socks on RLE, sup on LLE)    ACTIVITY TOLERANCE: vitals stable                         O2 SATURATIONS       EDUCATION PROVIDED  Patient Education : Posture/Positioning; Functional Transfer Techniques; Role of Occupational Therapy; Plan of Care  Patient's Response to Education: Verbalized Understanding    Equipment used: RW  Demonstrates functional use, Would benefit from additional trial      Exercises:    Exercises Repetitions Comments   Scapular elevation     Scapular retraction     Shoulder rolls     Shoulder flexion     Shoulder abduction     Shoulder internal/external rotation     Forward punch     Elbow flexion     Elbow extension     Forearm pronation/supination     Wrist flexion/extension     Gross grasp/fist pumps     Ankle pumps     Knee extension     Marching       Therapist comments: Pt required extended time  Patient End of Session: In bed, With  staff, Needs met, Call light within reach, RN aware of session/findings, All patient questions and concerns addressed, Hospital anti-slip socks, Alarm set, Family present    SUBJECTIVE  Pt stated, \"I cannot do anything without help.\"    PAIN ASSESSMENT  Rating: Unable to rate  Location: leg incision  Management Techniques: Activity promotion, Body mechanics, Breathing techniques, Relaxation, Repositioning     OBJECTIVE  Precautions: Other (Comment) (R groin incision, R upper arm hematoma)    AM-PAC ‘6-Clicks’ Inpatient Daily Activity Short Form  -   Putting on and taking off regular lower body clothing?: A Lot  -   Bathing (including washing, rinsing, drying)?: A Little  -    Toileting, which includes using toilet, bedpan or urinal? : A Little  -   Putting on and taking off regular upper body clothing?: A Little  -   Taking care of personal grooming such as brushing teeth?: A Little  -   Eating meals?: A Little    AM-PAC Score:  Score: 17  Approx Degree of Impairment: 50.11%  Standardized Score (AM-PAC Scale): 37.26    PLAN  OT Device Recommendations: Reacher, Sock aid  OT Treatment Plan: Functional transfer training, ADL training, Energy conservation/work simplification techniques, Endurance training, Patient/Family training, Equipment eval/education, Patient/Family education, Compensatory technique education  Rehab Potential : Good  Frequency: 3-5x/week    OT Goals:   ADL Goals   Patient will perform grooming: with supervision and while standing at sink  Patient will perform lower body dressing:  with supervision and with adaptive equipment PRN  Patient will perform toileting: with supervision     Functional Transfer Goals  Patient will transfer from sit to supine:  with supervision  Patient will transfer from supine to sit:  with supervision  Patient will transfer to toilet:  with supervision  Patient will use leg lift or similar item to assist LEs with bed mobility as needed.     OT Session Time: 25 minutes  Self-Care Home Management: 10 minutes  Therapeutic Activity: 15 minutes  Neuromuscular Re-education: 0 minutes  Therapeutic Exercise: 0 minutes  Cognitive Skills: 0 minutes  Sensory Integrative: 0 minutes  Orthotic Management and Trainin minutes  Can add/delete any of these

## 2025-03-25 NOTE — CM/SW NOTE
SW met with pt/spouse at bedside for DC planning. Provided accepting rehab list. Reviewed recommendations for Home with Home Health Care, pt does not feel ready to dc home. States she needs help getting to/from bathroom. Pt has hx at [facility], sent referral for return if able to accept.     SW to follow up.    Addendum 2:00- PERRY met with pt's spouse outside room. Requesting map of facilities as it is difficult to tell on his phone. SW printed map/directions for facilities listed. Their first choice is [facility], second [facility]. Message sent to both. Initially pt/spouse resistant to DC and SW informed them on right to appeal. At this time, they are agreeable with DC.     Awaiting response from facilities.       3:20- Both [facility] and [facility] have bed available today. PERRY charting in front pod when spouse came out of room. Says patient is having a reaction to medication, RN notified.       GENIA Murdock

## 2025-03-25 NOTE — PHYSICAL THERAPY NOTE
PHYSICAL THERAPY TREATMENT NOTE - INPATIENT    Room Number: 7601/7601-A     Session: 3     Number of Visits to Meet Established Goals: 5    Presenting Problem: R iliofemoral endarterectomy 3/20  Co-Morbidities : CAD, CABG, blood clotting disorder, h/o DVT/PE, AF, HF, HTN, HLD, anxiety, R foot gangrene    PHYSICAL THERAPY MEDICAL/SOCIAL HISTORY  History related to current admission: Patient is a 80 year old female admitted on 3/20/2025 from home for planned R iliofemoral endarterectomy.       HOME SITUATION  Type of Home: House  Home Layout: Two level, Able to live on main level, Other (Comment) ([place])  Stairs to Enter : 1        Stairs to Bedroom: 0         Lives With: Spouse (needs knee surgery)    Drives: No   Patient Regularly Uses: Cane, Rolling walker (no device used at home; uses cane in community for short distances and RW for longer ones)       Prior Level of [place]: Pt typically is independent w/ adl's.  Uses a rolling walker for longer community distances, cane for shorter community distances and no device w/I her house.  Does not need to do stairs when she returns home.    PHYSICAL THERAPY ASSESSMENT   Patient demonstrates fair progress this session, goals  progressing with 1/3 met this session.      Patient is requiring stand-by assist as a result of the following impairments: decreased functional strength, decreased endurance/aerobic capacity, pain, impaired static and dynamic balance, impaired motor planning, decreased muscular endurance, and medical status.     Patient continues to function below baseline with bed mobility, transfers, and gait.  Next session anticipate patient to progress bed mobility, transfers, and gait.  Physical Therapy will continue to follow patient for duration of hospitalization.    Patient continues to benefit from continued skilled PT services upon discharge.     PLAN DURING HOSPITALIZATION  Nursing Mobility Recommendation : 1 Assist  PT Device Recommendation:  Rolling walker  PT Treatment Plan: Bed mobility, Body mechanics, Endurance, Energy conservation, Patient education, Family education, Gait training, Range of motion, Strengthening, Stair training, Transfer training, Balance training  Frequency (Obs): 3-5x/week     CURRENT GOALS   Goal #1 Patient is able to demonstrate supine - sit EOB @ level: modified independent      Goal #2 Patient is able to demonstrate transfers EOB to/from BSC at assistance level: supervision   MET   Goal #3 Patient is able to ambulate 50 feet with assist device: walker - rolling at assistance level: supervision      Goal #4  Pt able to ascend and descend 2 stairs with CGA and HHA   Goal #5     Goal #6     Goal Comments: Goals established on 3/21/2025  3/25/2025 all goals ongoing     SUBJECTIVE  \"I don't know how they think I can go home\"    OBJECTIVE  Precautions: Other (Comment) (R groin incision, R upper arm hematoma)    WEIGHT BEARING RESTRICTION     PAIN ASSESSMENT   Rating: Unable to rate  Location: RLE  Management Techniques: Activity promotion    BALANCE                                                                                                                       Static Sitting: Fair  Dynamic Sitting: Fair           Static Standing: Poor +  Dynamic Standing: Poor +    ACTIVITY TOLERANCE                         O2 WALK       AM-PAC '6-Clicks' INPATIENT SHORT FORM - BASIC MOBILITY  How much difficulty does the patient currently have...  Patient Difficulty: Turning over in bed (including adjusting bedclothes, sheets and blankets)?: A Little   Patient Difficulty: Sitting down on and standing up from a chair with arms (e.g., wheelchair, bedside commode, etc.): A Little   Patient Difficulty: Moving from lying on back to sitting on the side of the bed?: A Little   How much help from another person does the patient currently need...   Help from Another: Moving to and from a bed to a chair (including a wheelchair)?: A Little   Help from  Another: Need to walk in hospital room?: A Little   Help from Another: Climbing 3-5 steps with a railing?: A Little     AM-PAC Score:  Raw Score: 18   Approx Degree of Impairment: 46.58%   Standardized Score (AM-PAC Scale): 43.63   CMS Modifier (G-Code): CK    FUNCTIONAL ABILITY STATUS  Gait Assessment   Functional Mobility/Gait Assessment  Gait Assistance: Supervision  Distance (ft): 30  Assistive Device: Rolling walker  Pattern:  (dec emy and gait speed)    Skilled Therapy Provided    Bed Mobility:  Rolling: NT   Supine<>Sit: supervision   Sit<>Supine: modA     Transfer Mobility:  Sit<>Stand: CGA   Stand<>Sit: supervision   Gait: supervision    Therapist's Comments: RN cleared for session. Pt agreeable for therapy, received supine. Pt cued on log roll technique to facilitate independence in supine<>sit. Instructed on proper UE/LE placement for STS transfer. Encouraged rest breaks between position changes. Instructed on RW sequencing and upright posture during ambulation, increased fatigue - declined attempts at stair steps today. Instructed to call for nursing staff for any needs and OOB mobility.       Patient End of Session: In bed, Needs met, Call light within reach, RN aware of session/findings, All patient questions and concerns addressed, Hospital anti-slip socks, Alarm set, Discussed recommendations with /    PT Session Time: 25 minutes  Gait Training: 15 minutes  Therapeutic Activity: 10 minutes

## 2025-03-25 NOTE — PLAN OF CARE
Assumed care of pt at 1930.  A&Ox4. Denies pain at this time.  O2 sat >95% on 1L nc. Breath sounds clear dim bilaterally.  Afib/Aflutter w/ BBB on tele.  BLE pitting edema.  R groin incision site EREN, C/D/I, pink. Dressing on right upper leg C/D/I.  Petechiae noted to timbo thighs, R>L.  Continent of B&B.  Activity level x2-person assist with walker.  Call light & belongings within reach. Bed in lowest position with alarm on and wheels locked. Updated on POC & verbalized understanding.    POC:  - Discharge planning -- dc to [facility]      Problem: PAIN - ADULT  Goal: Verbalizes/displays adequate comfort level or patient's stated pain goal  Description: INTERVENTIONS:- Encourage pt to monitor pain and request assistance- Assess pain using appropriate pain scale- Administer analgesics based on type and severity of pain and evaluate response- Implement non-pharmacological measures as appropriate and evaluate response- Consider cultural and social influences on pain and pain management- Manage/alleviate anxiety- Utilize distraction and/or relaxation techniques- Monitor for opioid side effects- Notify MD/LIP if interventions unsuccessful or patient reports new pain- Anticipate increased pain with activity and pre-medicate as appropriate  Outcome: Progressing     Problem: RISK FOR INFECTION - ADULT  Goal: Absence of fever/infection during anticipated neutropenic period  Description: INTERVENTIONS- Monitor WBC- Administer growth factors as ordered- Implement neutropenic guidelines  Outcome: Progressing     Problem: SAFETY ADULT - FALL  Goal: Free from fall injury  Description: INTERVENTIONS:- Assess pt frequently for physical needs- Identify cognitive and physical deficits and behaviors that affect risk of falls.- [place] fall precautions as indicated by assessment.- Educate pt/family on patient safety including physical limitations- Instruct pt to call for assistance with activity based on assessment- Modify environment to  reduce risk of injury- Provide assistive devices as appropriate- Consider OT/PT consult to assist with strengthening/mobility- Encourage toileting schedule  Outcome: Progressing     Problem: DISCHARGE PLANNING  Goal: Discharge to home or other facility with appropriate resources  Description: INTERVENTIONS:- Identify barriers to discharge w/pt and caregiver- Include patient/family/discharge partner in discharge planning- Arrange for needed discharge resources and transportation as appropriate- Identify discharge learning needs (meds, wound care, etc)- Arrange for interpreters to assist at discharge as needed- Consider post-discharge preferences of patient/family/discharge partner- Complete POLST form as appropriate- Assess patient's ability to be responsible for managing their own health- Refer to Case Management Department for coordinating discharge planning if the patient needs post-hospital services based on physician/LIP order or complex needs related to functional status, cognitive ability or social support system  Outcome: Progressing     Problem: CARDIOVASCULAR - ADULT  Goal: Absence of cardiac arrhythmias or at baseline  Description: INTERVENTIONS:- Continuous cardiac monitoring, monitor vital signs, obtain 12 lead EKG if indicated- Evaluate effectiveness of antiarrhythmic and heart rate control medications as ordered- Initiate emergency measures for life threatening arrhythmias- Monitor electrolytes and administer replacement therapy as ordered  Outcome: Progressing     Problem: METABOLIC/FLUID AND ELECTROLYTES - ADULT  Goal: Glucose maintained within prescribed range  Description: INTERVENTIONS:- Monitor Blood Glucose as ordered- Assess for signs and symptoms of hyperglycemia and hypoglycemia- Administer ordered medications to maintain glucose within target range- Assess barriers to adequate nutritional intake and initiate nutrition consult as needed- Instruct patient on self management of  diabetes  Outcome: Progressing     Problem: SKIN/TISSUE INTEGRITY - ADULT  Goal: Incision(s), wounds(s) or drain site(s) healing without S/S of infection  Description: INTERVENTIONS:- Assess and document risk factors for pressure ulcer development- Assess and document skin integrity- Assess and document dressing/incision, wound bed, drain sites and surrounding tissue- Implement wound care per orders- Initiate isolation precautions as appropriate- Initiate Pressure Ulcer prevention bundle as indicated  Outcome: Progressing

## 2025-03-25 NOTE — PLAN OF CARE
Per patient, she has not been taking Losartan for some time despite beign listed on her office med list.  She is quite adamant about not taking , despite my education regaurding LV remodeling with prior rEF.     In addition, she and vannesa concerned about a near area of brusing on her right forearm.  Her INR is 1.9 today, will stop aspirin and dvt prophlaxis lovenox to lower bleeding risk.       SARAH robbins

## 2025-03-25 NOTE — PROGRESS NOTES
Doing well- notes reviewed- OK for discharge from my point of view when OK with other MDs/ Social Sevcathy

## 2025-03-26 ENCOUNTER — INITIAL APN SNF VISIT (OUTPATIENT)
Dept: INTERNAL MEDICINE CLINIC | Age: 80
End: 2025-03-26

## 2025-03-26 VITALS
HEART RATE: 89 BPM | DIASTOLIC BLOOD PRESSURE: 77 MMHG | HEIGHT: 62 IN | WEIGHT: 164.88 LBS | BODY MASS INDEX: 30.34 KG/M2 | RESPIRATION RATE: 11 BRPM | TEMPERATURE: 98 F | SYSTOLIC BLOOD PRESSURE: 104 MMHG | OXYGEN SATURATION: 94 %

## 2025-03-26 VITALS
HEART RATE: 91 BPM | RESPIRATION RATE: 16 BRPM | OXYGEN SATURATION: 95 % | TEMPERATURE: 98 F | DIASTOLIC BLOOD PRESSURE: 66 MMHG | SYSTOLIC BLOOD PRESSURE: 102 MMHG

## 2025-03-26 DIAGNOSIS — E78.00 PURE HYPERCHOLESTEROLEMIA: ICD-10-CM

## 2025-03-26 DIAGNOSIS — I74.09 AORTOILIAC OCCLUSIVE DISEASE (HCC): ICD-10-CM

## 2025-03-26 DIAGNOSIS — N18.4 STAGE 4 CHRONIC KIDNEY DISEASE (HCC): Primary | ICD-10-CM

## 2025-03-26 DIAGNOSIS — I48.11 LONGSTANDING PERSISTENT ATRIAL FIBRILLATION (HCC): ICD-10-CM

## 2025-03-26 DIAGNOSIS — I25.10 ATHEROSCLEROSIS OF NATIVE CORONARY ARTERY OF NATIVE HEART WITHOUT ANGINA PECTORIS: ICD-10-CM

## 2025-03-26 DIAGNOSIS — I50.32 CHRONIC HEART FAILURE WITH PRESERVED EJECTION FRACTION (HFPEF) (HCC): ICD-10-CM

## 2025-03-26 DIAGNOSIS — D68.61 ANTIPHOSPHOLIPID SYNDROME (HCC): ICD-10-CM

## 2025-03-26 DIAGNOSIS — E03.9 ACQUIRED HYPOTHYROIDISM: ICD-10-CM

## 2025-03-26 DIAGNOSIS — I10 PRIMARY HYPERTENSION: ICD-10-CM

## 2025-03-26 LAB
GLUCOSE BLD-MCNC: 113 MG/DL (ref 70–99)
GLUCOSE BLD-MCNC: 119 MG/DL (ref 70–99)
GLUCOSE BLD-MCNC: 125 MG/DL (ref 70–99)
INR BLD: 2.31 (ref 0.8–1.2)
PROTHROMBIN TIME: 25.6 SECONDS (ref 11.6–14.8)

## 2025-03-26 PROCEDURE — 99306 1ST NF CARE HIGH MDM 50: CPT | Performed by: NURSE PRACTITIONER

## 2025-03-26 PROCEDURE — 1124F ACP DISCUSS-NO DSCNMKR DOCD: CPT | Performed by: NURSE PRACTITIONER

## 2025-03-26 PROCEDURE — 99233 SBSQ HOSP IP/OBS HIGH 50: CPT | Performed by: HOSPITALIST

## 2025-03-26 RX ORDER — HYDROCODONE BITARTRATE AND ACETAMINOPHEN 5; 325 MG/1; MG/1
1 TABLET ORAL EVERY 4 HOURS PRN
Qty: 40 TABLET | Refills: 0 | Status: SHIPPED | OUTPATIENT
Start: 2025-03-26

## 2025-03-26 RX ORDER — HYDROCODONE BITARTRATE AND ACETAMINOPHEN 5; 325 MG/1; MG/1
1 TABLET ORAL EVERY 6 HOURS PRN
Status: DISCONTINUED | OUTPATIENT
Start: 2025-03-26 | End: 2025-03-26

## 2025-03-26 NOTE — ASSESSMENT & PLAN NOTE
S/p DCCV 12/2024  Amiodarone 200 mg daily  Metoprolol ER 50 mg daily  Rate controlled  Plans for ablation and DCCV post recovery from vascular surgery  Follow up with cardiology [facility].  Warfarin 2.5 mg nightly, INR tomorrow

## 2025-03-26 NOTE — PLAN OF CARE
Assumed care at 0730  Patient alert, oriented x 4  VSS, RA throughout shift, Afib on tele  PRN Norco for pain  Up x SBA w/ cane  Voiding in bathroom  (+) BM this shift  R groin/upper leg dressings changed this shift, painted w betadine  Border foam applied to skin tears on L arm  Cardiology notified regarding bruising on R arm, no new orders  Tolerating diet well  Call light within reach     Printed prescription sent with patient  AVS sent with patient  Report called to VANDANA Ley at [facility], all questions answered  Belongings sent with patient    NURSING DISCHARGE NOTE    Discharged Rehab facility via Ambulance.  Accompanied by Support staff  Belongings Taken by patient/family.

## 2025-03-26 NOTE — CM/SW NOTE
03/26/25 1121   Discharge disposition   Expected discharge disposition subacute   Post Acute Care Provider [facility] at    Discharge transportation [facility]     Pt discussed in rounds and she is cleared for discharge today to [facility]  Confirmed w/liaisonRadha, that they have a bed today in a private room    Met with patient and her  to confirm choice and they are agreeable to [place] and to [facility] cost    [facility] at 1pm (ambulance at [facility] rates)  PCS completed in Epic    RN Report: [facility] at [facility] [phone]     / to remain available for support and/or discharge planning.     Sabrina Ojeda MBA MSN, RN Case Manager  [phone]

## 2025-03-26 NOTE — ASSESSMENT & PLAN NOTE
PVD s/p elective stent graft of abdominal aorta and Right common iliac  S/P right iliofemoral, profunda and superficial femoral endarterectomy  Dr John Hoffmann surgeon 3/20/2025  Follow up as directed  Wound care as directed

## 2025-03-26 NOTE — PROGRESS NOTES
[facility] Cardiology Progress Note    Kera Reed Patient Status:  Inpatient    1945 MRN [id number]   Location [facility] 7NE-A Attending John Hoffmann MD   Hosp Day # 6 PCP Daya Chandler MD     Subjective:  No acute events overnight  New skin tear to left elbow  Denies pain  Attempted to auscultate patient, but  insisted he converse with her via telephone and for meto come back at later time.     Objective:  /75 (BP Location: Right arm)   Pulse 93   Temp 97.9 °F (36.6 °C) (Oral)   Resp (!) 32   Ht 5' 2\" (1.575 m)   Wt 164 lb 14.5 oz (74.8 kg)   LMP  (LMP Unknown)   SpO2 91%   BMI 30.16 kg/m²     Telemetry: Afib      Intake/Output:    Intake/Output Summary (Last 24 hours) at 3/26/2025 0932  Last data filed at 3/26/2025 0000  Gross per 24 hour   Intake 360 ml   Output 925 ml   Net -565 ml       Last 3 Weights   25 0200 164 lb 14.5 oz (74.8 kg)   25 0600 164 lb 7.4 oz (74.6 kg)   25 1200 160 lb 11.5 oz (72.9 kg)   25 0608 158 lb 4.8 oz (71.8 kg)   25 1442 155 lb (70.3 kg)   25 0508 159 lb 9.8 oz (72.4 kg)   25 0500 162 lb 11.2 oz (73.8 kg)   25 1227 158 lb 4.6 oz (71.8 kg)   25 1025 158 lb 4.6 oz (71.8 kg)   25 1000 150 lb (68 kg)   01/10/25 1419 151 lb (68.5 kg)       Labs:  Recent Labs   Lab 25  1227 25  0500 25  0409 25  0448 25  0516   * 109* 111*  --   --    BUN 24* 18 19  --   --    CREATSERUM 0.99 0.91 0.98  --   --    EGFRCR 57* 63 58*  --   --    CA 8.6* 8.6* 8.9  --   --     139 137  --   --    K 3.7 4.4  4.4 3.8 3.8 3.6    107 101  --   --    CO2 24.0 27.0 25.0  --   --      Recent Labs   Lab 25  0500 25  0409 25  0542   RBC 2.99* 3.14* 3.53*   HGB 9.7* 10.1* 11.2*   HCT 28.8* 30.4* 33.7*   MCV 96.3 96.8 95.5   MCH 32.4 32.2 31.7   MCHC 33.7 33.2 33.2   RDW 18.9 18.6 17.2   NEPRELIM  --  5.02 3.74   WBC 7.6 6.9 5.6   .0  185.0 265.0         No results for input(s): \"TROP\", \"TROPHS\", \"CK\" in the last 168 hours.    Diagnostics:             Physical Exam:    Physical Exam  Cardiovascular:      Rate and Rhythm: Normal rate. Rhythm irregular.   Pulmonary:      Effort: Pulmonary effort is normal.   Neurological:      Mental Status: She is alert and oriented to person, place, and time.         Medications:   metoprolol succinate ER  50 mg Oral Daily Beta Blocker    warfarin  5 mg Oral Nightly    clopidogrel  75 mg Oral Daily    pantoprazole  40 mg Intravenous QAM AC    Or    pantoprazole  40 mg Oral QAM AC    insulin aspart  1-10 Units Subcutaneous TID AC and HS    amiodarone  200 mg Oral Daily    bumetanide  1 mg Oral BID (Diuretic)    busPIRone  10 mg Oral BID    levothyroxine  75 mcg Oral Before breakfast    empagliflozin  10 mg Oral Daily         Assessment:    Persistent Afib   S/p DCCV 12/2024 with reversion back to Afib  Amio started 2/2025 with plans for future DCCV  Rate control at present,( on lower dose Metoprolol post amio load )  Rate control for now with plans for ablation once other medical issues recovered.     PVD  S/p elective stent graft of abdominal aorta and R common iliac  S/p R iliofemoral, profunda, and superficial femoral endarderectomy  by Dr. Hoffmann 3/20/25  CAD   s/p cabg 2012, s/p PCI LM/LAD 11/2024  No angina  Asa/plavix  Stop aspirin once INR therapeutic on coumadin   Chronic HFimpEF with RH failure and severe TR  EF 50-55% this admission   Compensated, euvolemic  GDMT: was on high dose toprol xl prior to admission, now rate controlled on Metoprolol tartrate;  Empagliflozin; not on MRA prior to admission. Tells me she stopped Losartan months ago (reasons unclear)     Severe TR  Following in structural heart, seen 1/2025 with follow up plan in June/July   Hard to tell if s/s are r/t afib vs valve  Factor V [place]/APLS with h/o PE/DVT  Warfarin       Plan:    Stop aspirin, given need for plavix and warfarin with  now therapeutic INR  Pt and  would prefer their primary care to continue to manage INR outpt  Plan for DCCV on amio in coming months with stable therapeutic INR to help delineate etiology of her hf symptoms, will stay on amio  Stop losartan, pt uncomfortable with bp lowering effect.  Can follow outpt for reintroduction of Losartan and MRA potentially.   Ok to DC to [facility] today.   INR Friday, long term mgmt per pcp.     Gege Smith, APRN  3/26/2025  9:32 AM  Ph [phone] ([place])  Ph [phone] ([place])

## 2025-03-26 NOTE — CONGREGATE LIVING REVIEW
Congregate Living Authorization    [facility] (CLRC) has reviewed this case and the committee DOES NOT RECOMMEND discharge to a skilled nursing facility under skilled care.    The CLRC recommends:  Home with home health and any other appropriate caregiver assistance or medical equipment as needed vs respite in SNF.     Does not appear to have skilled services for [facility], but additional home support appears to be needed.    For questions regarding CLRC approval process, please contact the CM assigned to the case.  For questions regarding RN discharge workflow, please contact the unit Clinical Leader.

## 2025-03-26 NOTE — CONGREGATE LIVING REVIEW
[facility] Living Authorization    The [facility] Review Committee has reviewed this case and the patient IS APPROVED for discharge to a facility for Short Term Skilled once the following procedure is followed:     - The physician discharge instructions (contained within the [facility] note for SNF) must inlcude the below appropriate and approved COVID instructions to the facility    For questions regarding CLRC approval process, please contact the CM assigned to the case.  For questions regarding RN discharge workflow, please contact the unit Clinical Leader.

## 2025-03-26 NOTE — PROGRESS NOTES
Skilled Nursing Facility, Subacute Rehab  [facility]     Kera Reed Author: Basia Iglesias, APRN     1945 MRN [id number]   Last Hospital  Admission 3/20/25      Last Hospital Discharge 3/26/25 PCP Daya Chandler MD       HPI:      Kera Reed is a 80 year old female with previous medical history including significant peripheral vascular disease with discomfort of right foot, hx DVT/PE/Factor V leiden on warfarin, hx CABG. She was admitted to the hospital for vascular surgery including s/p stent, graft placement or the abdominal aorta, right common illac and endarterectomy.   Now admitted to SNF for sub-acute rehabilitation.     Kera is seen in the bed in her room. She has just arrived from the hospital. She has pain mostly at her left leg, some at the right arm where she has a hematoma and remnants of arm swelling and purple bruising from HIGH INR a few weeks ago.  No nausea, appetite is less. Had BM post surgery. DW her plan of care and questions answered. DW nursing. Meds reviewed.      Hospital Discharge Diagnoses:  PVD s/p stent graft and endarterectomy  Persistent Afib  CAD s/p CABG and PCI  CHFpEF  Severe TR  Factor V Leiden hx PE/DVT  weakness    Chief Complaint at visit:   Chief Complaint   Patient presents with    Follow - Up     S/P vascular surgery  RUE hematoma after elevated INR  Weakness and pain         ALLERGIES    ALLERGIES:  No Known Allergies    CURRENT MEDS:      CURRENT MEDICATIONS   Current Outpatient Medications   Medication Sig Dispense Refill    ALPRAZolam 0.25 MG Oral Tab Take 1 tablet (0.25 mg total) by mouth daily as needed. 20 tablet 0    HYDROcodone-acetaminophen 5-325 MG Oral Tab Take 1 tablet by mouth every 8 (eight) hours as needed for Pain. 30 tablet 0    amiodarone 200 MG Oral Tab Take 1 tablet (200 mg total) by mouth daily. 30 tablet 0    metoprolol succinate ER 50 MG Oral Tablet 24 Hr Take 1 tablet (50 mg total) by mouth Daily Beta Blocker.  30 tablet 0    warfarin 2.5 MG Oral Tab Take 1 tablet (2.5 mg total) by mouth nightly. 30 tablet 0    Naloxone HCl 4 MG/0.1ML Nasal Liquid 4 mg by Intranasal route as needed (May repeat as needed in other nostril if symptoms persist). If patient remains unresponsive, repeat dose in other nostril 2-5 minutes after first dose. 1 kit 0    busPIRone 10 MG Oral Tab Take 1 tablet (10 mg total) by mouth in the morning and 1 tablet (10 mg total) before bedtime.      bumetanide 1 MG Oral Tab Take 1 tablet (1 mg total) by mouth BID (Diuretic).      Multiple Vitamins-Minerals (MULTIVITAMIN ADULTS 50+ OR) Take 1 tablet by mouth daily.      empagliflozin (JARDIANCE) 10 MG Oral Tab Take 1 tablet (10 mg total) by mouth daily.      Potassium Chloride ER 20 MEQ Oral Tab CR Take 20 mEq by mouth daily.      clopidogrel 75 MG Oral Tab Take 1 tablet (75 mg total) by mouth daily. 90 tablet 1    Levothyroxine Sodium 75 MCG Oral Tab Take 1 tablet (75 mcg total) by mouth once daily. 90 tablet 2    HYDROcodone-acetaminophen 5-325 MG Oral Tab Take 1 tablet by mouth every 4 (four) hours as needed. 40 tablet 0       HISTORY:    Past Medical History:    Allergic rhinitis    Congestive heart disease (HCC)    Coronary atherosclerosis    Deep vein thrombosis (HCC)    Disorder of thyroid    Factor V deficiency (HCC)    High blood pressure    High cholesterol    History of blood transfusion    Hypothyroidism    Pulmonary embolism (HCC)    Shortness of breath     Past Surgical History:   Procedure Laterality Date    Bypass surgery      cardiac    Cabg      Colonoscopy  07/23/2018    Wilfredo Hyde    Colonoscopy      Hysterectomy      Knee replacement surgery      Total knee replacement       Family History   Family history unknown: Yes     Social History     Socioeconomic History    Marital status:    Tobacco Use    Smoking status: Former     Current packs/day: 0.00     Average packs/day: 1 pack/day for 40.0 years (40.0 ttl pk-yrs)      Types: Cigarettes     Start date: 1960     Quit date: 2000     Years since quittin.2    Smokeless tobacco: Never   Vaping Use    Vaping status: Never Used   Substance and Sexual Activity    Alcohol use: Not Currently    Drug use: No     Social Drivers of Health     Food Insecurity: No Food Insecurity (3/20/2025)    NCSS - Food Insecurity     Worried About Running Out of Food in the Last Year: No     Ran Out of Food in the Last Year: No   Transportation Needs: No Transportation Needs (3/20/2025)    NCSS - Transportation     Lack of Transportation: No   Housing Stability: Not At Risk (3/20/2025)    NCSS - Housing/Utilities     Has Housing: Yes     Worried About Losing Housing: No     Unable to Get Utilities: No         POA none    CODE STATUS:  Full Code    ADVANCED CARE PLANNING TEAM: None    SUBJECTIVE/ ROS:       REVIEW OF SYSTEMS:  GENERAL HEALTH:fatigues easily  SKIN: denies any unusual skin lesions or rashes  WOUNDS: multiple skin tears and edema, hematomas   EYES:no visual complaints or deficits  HENT: denies nasal congestion, sinus pain or sore throat; and hearing loss negative  RESPIRATORY: denies shortness of breath, wheezing or cough   CARDIOVASCULAR:denies chest pain, no palpitations , denies cough  GI: denies nausea, vomiting, constipation, diarrhea; no rectal bleeding; no heartburn  Gu: No urinary frequency, urgency or dysuria  MUSCULOSKELETAL:no joint complaints upper or lower extremities  NEURO:no sensory or motor complaint  PSYCHE: no symptoms of depression or anxiety  HEMATOLOGY:on anticoagulants  ENDOCRINE: denies excessive thirst or urination; denies unexpected wt gain or wt loss  ALLERGY/IMM.: denies food or seasonal allergies      OBJECTIVE:     ASSESSMENT/ PHYSICAL EXAM:     VITALS:  /66   Pulse 91   Temp 97.7 °F (36.5 °C)   Resp 16   LMP  (LMP Unknown)   SpO2 95%      PHYSICAL EXAM:  GENERAL HEALTH: well developed, well nourished, in no apparent distress  LINES, TUBES,  DRAINS:  none  SKIN: warm, dry, multiple bruises BUE, RUE with edema and purple bruising, hematoma right axilla  WOUND: skin tears two on Left UE, one on right UE dressed.  LLE dressings intact  EYES: sclera anicteric, conjunctiva normal; there is no nystagmus, no drainage from eyes  HENT: normocephalic; normal nose, no nasal drainage, mucous membranes pink, moist.  NECK: supple, non tender, FROM  BREAST: deferred  RESPIRATORY:clear, no crackles, no wheezing, no dyspnea, no cough, room air  CARDIOVASCULAR: Irregular rhythm, rate normal,  S1 and S2, no murmurs, no edema  ABDOMEN:  normal active BS+, soft, nondistended; no organomegaly, no masses; nontender, no guarding, no rebound tenderness.  :Deferred  LYMPHATIC:no lymphedema  MUSCULOSKELETAL: no acute synovitis upper or lower extremity  EXTREMITIES/VASCULAR:radial pulses 2+ and dorsalis pedal pulses 2+  NEUROLOGIC: A&OX3, no focal deficits, follows commands  PSYCHIATRIC: calm, cooperative, mood and affect appropriate to situation    Hospital and rehab lab results and imaging reviewed as available.    DIAGNOSTICS REVIEWED AT THIS VISIT:    Lab Results   Component Value Date    WBC 5.6 03/25/2025    RBC 3.53 (L) 03/25/2025    HGB 11.2 (L) 03/25/2025    HCT 33.7 (L) 03/25/2025    MCV 95.5 03/25/2025    MCH 31.7 03/25/2025    MCHC 33.2 03/25/2025    RDW 17.2 03/25/2025    .0 03/25/2025     Lab Results   Component Value Date     (H) 03/22/2025    BUN 19 03/22/2025    CREATSERUM 0.98 03/22/2025    ANIONGAP 11 03/22/2025    GFR 43 (L) 07/17/2015    CA 8.9 03/22/2025    OSMOCALC 287 03/22/2025    ALKPHO 70 03/22/2025    AST 13 03/22/2025    ALT <7 (L) 03/22/2025    BILT 0.8 03/22/2025    TP 5.5 (L) 03/22/2025    ALB 3.5 03/22/2025    GLOBULIN 2.0 03/22/2025    AGRATIO 1.7 02/23/2016     03/22/2025    K 3.6 03/24/2025     03/22/2025    CO2 25.0 03/22/2025     Lab Results   Component Value Date    PTP 25.6 (H) 03/26/2025    INR 2.31 (H)  03/26/2025       [facility] medical records reviewed.  Medication reconciliation completed.        MEDICAL DECISION MAKING and PLAN OF CARE:       Assessment & Plan  Aortoiliac occlusive disease (HCC)  PVD s/p elective stent graft of abdominal aorta and Right common iliac  S/P right iliofemoral, profunda and superficial femoral endarterectomy  Dr John Hoffmann surgeon 3/20/2025  Follow up as directed  Wound care as directed       Stage 4 chronic kidney disease (HCC)  Baseline creat 1.0  Avoid nephrotoxic medications  Renal dose meds as needed  CMP weekly and prn       Longstanding persistent atrial fibrillation (HCC)  S/p DCCV 12/2024  Amiodarone 200 mg daily  Metoprolol ER 50 mg daily  Rate controlled  Plans for ablation and DCCV post recovery from vascular surgery  Follow up with cardiology [facility].  Warfarin 2.5 mg nightly, INR tomorrow       Primary hypertension  Metoprolol ER 50 mg daily  Bumex 1 mg BID  VS q shift and prn       Pure hypercholesterolemia  Not on statin per notes  Follow up with cardiology as outpatient       Atherosclerosis of native coronary artery of native heart without angina pectoris  Also severe TR plan for structural heart follow up in June/July  S/p CABG 2012  S/p PCI LM/LAD 11/2024  Aspirin DC for warfarin  Plavix continues at 75 mg daily   Follow up with cardiology as outpatient        Acquired hypothyroidism  Continue levothyroxine 75 mcg daily       Antiphospholipid syndrome ([facility])  Factor V Leiden hx DVT/PE  Resumed warfarin post op  INR tomorrow        Chronic heart failure with preserved ejection fraction (HFpEF) ([facility])  EF 50-55 %   Daily weights; Call if weight gain of 2# overnight or 4# in 5 days  2 gram sodium diet  2 liter/day fluid restriction  VS q shift and prn  Monitoring for edema, wt gain, shortness of breath, fatigue  CMP weekly and prn  Follow up with cardiology in 2-4 weeks  Metoprolol ER 50 mg daily  Empagliflozin 10 mg daily  Stopped losartan at home  Bumex 1 mg  BID  KCL 20 MEQ daily           COPD  No shortness of breath, no wheezing today  No rx    Anxiety/depression  Mood appears appropriate to situation  Alprazoloam 0.25 mg daily prn  Buspar 10 mg BID    Obesity  BMI 30  Follow up as outpatient once cleared for regular exercise  Participate in therapies as able now  Heart healthy diet    Physical Deconditioning/Weakness; at risk for falling  Fall Precautions  GEORGETTE team to establish care plan meeting with patient and POA/family as appropriate  GEORGETTE team/ & discharge planner to assist with establishing safe discharge plan for next level of care  PT/OT evaluate and treat  DC planning with MDT, SW, therapies, anticipated DC date : TBD  Services on DC: C RN/PT/OT/SLP/SW  Equipment on DC: Wc/walker/bed/Commode     Pain management  Monitor and assess pain  Norco 5/325 PO Q8 prn  Offer to pre-medicate 30-60 min prior to therapy  Physiatry evaluation with management appreciated    Bowel management  Monitor for Bms  Senna-docusate BID  prn    Vitamins/supplements as r/t deficiencies  GEORGETTE RD to follow while in rehab; supplementation/diet as per GEORGETTE RD  May continue home supplements    At risk for malnutrition  Dietician consult  Weekly weights  Supplements as indicated  Encourage po intake    DVT Prophylaxis   Encourage exercise and participation with therapy as much as able  warfarin    GI prophylaxis  none    Labs  CBC, CMP, PT/INR in am       Follow Ups:  PCP within 7 days of DC from rehab  Dr BASIM Hoffmann CV surgery 1 week for wound check  ZANA Montelongo 1 month  Dr Gomez Interventional cardiology in 1-2 weeks      This dictation was performed with a verbal recognition program (DRAGON) and it was checked for errors. It is possible that there are still dictated errors within this note. If so, please bring any errors to my attention for an addendum. All efforts were made to ensure that this note is accurate.      70 min spent w/ patient and reviewing medical records,  labs, completing medication reconciliation and entering orders to establish plan of care in [facility].      Note to patient: The 21st Century Cures Act makes medical notes like these available to patients in the interest of transparency. However, this is a medical document intended as peer to peer communication. It is written in medical language and may contain abbreviations or verbiage that are unfamiliar. It may appear blunt or direct. Medical documents are intended to carry relevant information, facts as evident, and the clinical opinion of the practitioner who signs the document.       Basia Iglesias, APRN  03/26/25

## 2025-03-26 NOTE — ASSESSMENT & PLAN NOTE
Baseline creat 1.0  Avoid nephrotoxic medications  Renal dose meds as needed  CMP weekly and prn

## 2025-03-26 NOTE — PROGRESS NOTES
[facility]   part of [facility]     Hospitalist Progress Note     Kera Reed Patient Status:  Inpatient    1945 MRN [id number]   formerly [facility] 4SW-A Attending John Hoffmann MD   Hosp Day # 6 PCP Daya Chandler MD     Chief Complaint: Medical management    Subjective:     Patient with new skin tear to left elbow, no new complaints.    Objective:    Review of Systems:   A comprehensive review of systems was completed; pertinent positive and negatives stated in subjective.    Vital signs:  Temp:  [97.5 °F (36.4 °C)-97.9 °F (36.6 °C)] 97.9 °F (36.6 °C)  Pulse:  [80-93] 93  Resp:  [13-32] 32  BP: ()/(67-78) 103/75  SpO2:  [90 %-96 %] 91 %    Physical Exam:    General: No acute distress  Respiratory: No wheezes, no rhonchi  Cardiovascular: S1, S2, regular rate and rhythm  Abdomen: Soft, Non-tender, non-distended, positive bowel sounds  Neuro: No new focal deficits.   Extremities: No edema      Diagnostic Data:    Labs:  Recent Labs   Lab 25  1227 25  0500 25  1446 25  0409 25  0510 25  0516 25  0542 25  0524   WBC 6.4 7.6  --  6.9  --   --  5.6  --    HGB 11.2* 9.7*  --  10.1*  --   --  11.2*  --    MCV 96.8 96.3  --  96.8  --   --  95.5  --    .0 199.0  --  185.0  --   --  265.0  --    INR  --  1.29*   < > 1.25* 1.32* 1.52* 1.95* 2.31*    < > = values in this interval not displayed.       Recent Labs   Lab 25  1227 25  0500 25  0409 25  0448 25  0516   * 109* 111*  --   --    BUN 24* 18 19  --   --    CREATSERUM 0.99 0.91 0.98  --   --    CA 8.6* 8.6* 8.9  --   --    ALB 3.5  --  3.5  --   --     139 137  --   --    K 3.7 4.4  4.4 3.8 3.8 3.6    107 101  --   --    CO2 24.0 27.0 25.0  --   --    ALKPHO 84  --  70  --   --    AST 19  --  13  --   --    ALT 16  --  <7*  --   --    BILT 0.9  --  0.8  --   --    TP 5.5*  --  5.5*  --   --        Estimated Glomerular Filtration  Rate: 58 mL/min/1.73m2 (A) (result from lab).    No results for input(s): \"TROP\", \"TROPHS\", \"CK\" in the last 168 hours.    Recent Labs   Lab 03/24/25  0516 03/25/25  0542 03/26/25  0524   PTP 18.4* 22.4* 25.6*   INR 1.52* 1.95* 2.31*                  Microbiology    No results found for this visit on 03/20/25.      Imaging: Reviewed in Epic.    Medications:    metoprolol succinate ER  50 mg Oral Daily Beta Blocker    warfarin  5 mg Oral Nightly    clopidogrel  75 mg Oral Daily    pantoprazole  40 mg Intravenous QAM AC    Or    pantoprazole  40 mg Oral QAM AC    insulin aspart  1-10 Units Subcutaneous TID AC and HS    amiodarone  200 mg Oral Daily    bumetanide  1 mg Oral BID (Diuretic)    busPIRone  10 mg Oral BID    levothyroxine  75 mcg Oral Before breakfast    empagliflozin  10 mg Oral Daily       Assessment & Plan:      #Infrarenal aortic stenosis, iliac stenosis, stenosis of right common femoral artery s/p stent graft placement of the abdominal aorta, right common iliac and endarterectomy  Per cardiology, per vascular surgery  Discontinue ASA and INR 2.3  Excellent doppler flow     #h/o DVT/PE, possible Factor V Leiden  On coumadin which is on hold  Coumadin resumed, dose increased, INR 2.31     #CHF, compensated, resume GDMT     #DMII, hyperglycemia protocol  A1c 6.8     #CAD with prev CABG 2012, PCI in 11/2024     #HTN, controlled     #DL, not on a statin    #Hypothyroidism, Synthroid    #A fib, persistent, on Amiodarone, Metoprolol, and Coumadin   BB resumed     #COPD, stable      #Anxiety/depression, resume PTA meds, Xanax, Buspirone    #Obesity, BMI 30    #Dispo, dc planning, home with home health today      Anabela Najera MD    Supplementary Documentation:     Quality:  DVT Mechanical Prophylaxis:        DVT Pharmacologic Prophylaxis   Medication    warfarin (Coumadin) tab 5 mg                Code Status: Full Code  Franks: External urinary catheter in place  Franks Duration (in days):   Central line:     LACHELLE: 3/26/2025    Discharge is dependent on: progress  At this point Ms. Reed is expected to be discharge to: tbd    The 21st Century Cures Act makes medical notes like these available to patients in the interest of transparency. Please be advised this is a medical document. Medical documents are intended to carry relevant information, facts as evident, and the clinical opinion of the practitioner. The medical note is intended as peer to peer communication and may appear blunt or direct. It is written in medical language and may contain abbreviations or verbiage that are unfamiliar.              **Certification      PHYSICIAN Certification of Need for Inpatient Hospitalization - Initial Certification    Patient will require inpatient services that will reasonably be expected to span two midnight's based on the clinical documentation in H+P.   Based on patients current state of illness, I anticipate that, after discharge, patient will require TBD.

## 2025-03-26 NOTE — ASSESSMENT & PLAN NOTE
Also severe TR plan for structural heart follow up in June/July  S/p CABG 2012  S/p PCI LM/LAD 11/2024  Aspirin DC for warfarin  Plavix continues at 75 mg daily   Follow up with cardiology as outpatient

## 2025-03-26 NOTE — PLAN OF CARE
Assumed care of patient at shift changed. Patient alert and orientated x4 with complaints of pain to R groin. PRN [place] administered as ordered, see MAR. No complaints of shortness of breath, on room air. See flowsheets and see MAR.

## 2025-03-27 NOTE — DISCHARGE SUMMARY
[facility]    PATIENT'S NAME: NIKHIL BOLAND   ATTENDING PHYSICIAN: John Hoffmann M.D.   PATIENT ACCOUNT#:   [id number]    LOCATION:  [address] [place] [address]  MEDICAL RECORD #:   [id number]       YOB: 1945  ADMISSION DATE:       03/20/2025      DISCHARGE DATE:  03/26/2025    DISCHARGE SUMMARY    HISTORY AND HOSPITAL COURSE:  An 80-year-old white female admitted to the hospital with severe rest pain and ulceration of the right foot, underwent a right iliofemoral, profunda femoral, and superficial femoral endarterectomy and vein patch and stent graft of the abdominal aorta and right common iliac artery.  She still has some disease in the left common femoral and the left common iliac artery, but no symptoms of her left leg.  She tolerated the procedure well.  The wounds are healing nicely.  She has some ecchymosis in the area of the thigh and then her arm due to fragility of her skin.  She is being discharged on warfarin 5 mg a day, being followed by [facility].  Her last INR was 2.31.  She had some problems with amiodarone, defer this to Cardiology, affecting the intensity of the Coumadin effect on her.  Otherwise, she is doing fine.  Neurologically, she is intact.  Vital signs stable.  She is afebrile, and her renal function has been stable and her last hemoglobin was 11.2.      DISCHARGE INSTRUCTIONS:  Instructed on wound care, activity, and followup.  All questions answered.    Dictated By John Hoffmann M.D.  d: 03/26/2025 07:05:53  t: 03/26/2025 12:59:12  [facility] [id number]  [facility]/

## 2025-03-28 ENCOUNTER — SNF VISIT (OUTPATIENT)
Dept: INTERNAL MEDICINE CLINIC | Age: 80
End: 2025-03-28

## 2025-03-28 VITALS
SYSTOLIC BLOOD PRESSURE: 111 MMHG | OXYGEN SATURATION: 95 % | HEART RATE: 106 BPM | RESPIRATION RATE: 20 BRPM | WEIGHT: 154 LBS | TEMPERATURE: 98 F | DIASTOLIC BLOOD PRESSURE: 70 MMHG | BODY MASS INDEX: 28 KG/M2

## 2025-03-28 DIAGNOSIS — I48.11 LONGSTANDING PERSISTENT ATRIAL FIBRILLATION (HCC): ICD-10-CM

## 2025-03-28 DIAGNOSIS — I10 PRIMARY HYPERTENSION: ICD-10-CM

## 2025-03-28 DIAGNOSIS — E03.9 ACQUIRED HYPOTHYROIDISM: ICD-10-CM

## 2025-03-28 DIAGNOSIS — D68.61 ANTIPHOSPHOLIPID SYNDROME (HCC): ICD-10-CM

## 2025-03-28 DIAGNOSIS — I50.32 CHRONIC HEART FAILURE WITH PRESERVED EJECTION FRACTION (HFPEF) (HCC): ICD-10-CM

## 2025-03-28 DIAGNOSIS — E87.6 HYPOKALEMIA: ICD-10-CM

## 2025-03-28 DIAGNOSIS — N18.4 STAGE 4 CHRONIC KIDNEY DISEASE (HCC): ICD-10-CM

## 2025-03-28 DIAGNOSIS — I25.10 ATHEROSCLEROSIS OF NATIVE CORONARY ARTERY OF NATIVE HEART WITHOUT ANGINA PECTORIS: ICD-10-CM

## 2025-03-28 DIAGNOSIS — I74.09 AORTOILIAC OCCLUSIVE DISEASE (HCC): Primary | ICD-10-CM

## 2025-03-28 DIAGNOSIS — E78.00 PURE HYPERCHOLESTEROLEMIA: ICD-10-CM

## 2025-03-28 PROCEDURE — 99309 SBSQ NF CARE MODERATE MDM 30: CPT | Performed by: NURSE PRACTITIONER

## 2025-03-28 NOTE — PROGRESS NOTES
Kera Reed, 1/7/1945, 80 year old, female    Chief Complaint:    Chief Complaint   Patient presents with    Follow - Up     S/p vascular surgery  Chronic warfarin  Pain  weakness        Subjective:   TODAY:  Kera is seen today in her room. She is washing her hands at the sink. She is feelling well today. Therapy is going well. Pain meds are managing the pain. DW her, Potassium is wnl now. Will give KCL 40 MEQ BID, warfarin is therapeutic.   Will repeat Labs on Monday. She is eating well. Slept well last night.   DW nursing. DW wound care, left heel, RLE surgical and sleeves in place for skin tears.     Denies insomnia, fatigue, fever/chills, cough, SOB, dyspnea, angina, palpitations, n/v, diarrhea, constipation, and urinary sxs.      Objective:  /70   Pulse 106   Temp 97.9 °F (36.6 °C)   Resp 20   Wt 154 lb (69.9 kg)   LMP  (LMP Unknown)   SpO2 95%   BMI 28.17 kg/m²                     PHYSICAL EXAM:  GENERAL HEALTH: well developed, well nourished, in no apparent distress  LINES, TUBES, DRAINS:  none  SKIN: warm, dry, multiple bruises BUE, RUE with edema and purple bruising, hematoma right axilla  WOUND: skin tears two on Left UE, one on right UE dressed.  LLE dressings intact  EYES: sclera anicteric, conjunctiva normal; there is no nystagmus, no drainage from eyes  HENT: normocephalic; normal nose, no nasal drainage, mucous membranes pink, moist.  NECK: supple, non tender, FROM  BREAST: deferred  RESPIRATORY:clear, no crackles, no wheezing, no dyspnea, no cough, room air  CARDIOVASCULAR: Irregular rhythm, rate normal,  S1 and S2, no murmurs, no edema  ABDOMEN:  normal active BS+, soft, nondistended; no organomegaly, no masses; nontender, no guarding, no rebound tenderness.  :Deferred  LYMPHATIC:no lymphedema  MUSCULOSKELETAL: no acute synovitis upper or lower extremity  EXTREMITIES/VASCULAR:radial pulses 2+ and dorsalis pedal pulses 2+  NEUROLOGIC: A&OX3, no focal deficits, follows  commands  PSYCHIATRIC: calm, cooperative, mood and affect appropriate to situation    Medications reviewed: Yes      Current Outpatient Medications:     HYDROcodone-acetaminophen 5-325 MG Oral Tab, Take 1 tablet by mouth every 4 (four) hours as needed., Disp: 40 tablet, Rfl: 0    ALPRAZolam 0.25 MG Oral Tab, Take 1 tablet (0.25 mg total) by mouth daily as needed., Disp: 20 tablet, Rfl: 0    HYDROcodone-acetaminophen 5-325 MG Oral Tab, Take 1 tablet by mouth every 8 (eight) hours as needed for Pain., Disp: 30 tablet, Rfl: 0    amiodarone 200 MG Oral Tab, Take 1 tablet (200 mg total) by mouth daily., Disp: 30 tablet, Rfl: 0    metoprolol succinate ER 50 MG Oral Tablet 24 Hr, Take 1 tablet (50 mg total) by mouth Daily Beta Blocker., Disp: 30 tablet, Rfl: 0    warfarin 2.5 MG Oral Tab, Take 1 tablet (2.5 mg total) by mouth nightly., Disp: 30 tablet, Rfl: 0    Naloxone HCl 4 MG/0.1ML Nasal Liquid, 4 mg by Intranasal route as needed (May repeat as needed in other nostril if symptoms persist). If patient remains unresponsive, repeat dose in other nostril 2-5 minutes after first dose., Disp: 1 kit, Rfl: 0    busPIRone 10 MG Oral Tab, Take 1 tablet (10 mg total) by mouth in the morning and 1 tablet (10 mg total) before bedtime., Disp: , Rfl:     bumetanide 1 MG Oral Tab, Take 1 tablet (1 mg total) by mouth BID (Diuretic)., Disp: , Rfl:     Multiple Vitamins-Minerals (MULTIVITAMIN ADULTS 50+ OR), Take 1 tablet by mouth daily., Disp: , Rfl:     empagliflozin (JARDIANCE) 10 MG Oral Tab, Take 1 tablet (10 mg total) by mouth daily., Disp: , Rfl:     Potassium Chloride ER 20 MEQ Oral Tab CR, Take 40 mEq by mouth in the morning and 40 mEq before bedtime., Disp: , Rfl:     clopidogrel 75 MG Oral Tab, Take 1 tablet (75 mg total) by mouth daily., Disp: 90 tablet, Rfl: 1    Levothyroxine Sodium 75 MCG Oral Tab, Take 1 tablet (75 mcg total) by mouth once daily., Disp: 90 tablet, Rfl: 2      Diagnostics reviewed:      Lab Results    Component Value Date    WBC 6.1 03/27/2025    RBC 3.44 (L) 03/27/2025    HGB 11.1 (L) 03/27/2025    HCT 32.7 (L) 03/27/2025    MCV 95.1 03/27/2025    MCH 32.3 03/27/2025    MCHC 33.9 03/27/2025    RDW 16.6 03/27/2025    .0 03/27/2025     Lab Results   Component Value Date    GLU 88 03/28/2025    BUN 20 03/28/2025    CREATSERUM 0.85 03/28/2025    ANIONGAP 10 03/28/2025    GFR 43 (L) 07/17/2015    CA 9.0 03/28/2025    OSMOCALC 294 03/28/2025    ALKPHO 75 03/27/2025    AST 21 03/27/2025    ALT 11 03/27/2025    BILT 0.6 03/27/2025    TP 5.6 (L) 03/27/2025    ALB 3.6 03/27/2025    GLOBULIN 2.0 03/27/2025    AGRATIO 1.7 02/23/2016     03/28/2025    K 4.1 03/28/2025     03/28/2025    CO2 27.0 03/28/2025     Lab Results   Component Value Date    PTP 28.8 (H) 03/27/2025    INR 2.68 (H) 03/27/2025       Assessment and plan:  Assessment & Plan  Aortoiliac occlusive disease (HCC)  PVD s/p elective stent graft of abdominal aorta and Right common iliac  S/P right iliofemoral, profunda and superficial femoral endarterectomy  Dr John Hoffmann surgeon 3/20/2025  Follow up as directed  Wound care as directed  Pain managed with Norco prn       Stage 4 chronic kidney disease (HCC)  Baseline creat 1.0  Avoid nephrotoxic medications  Renal dose meds as needed  CMP weekly and prn       Longstanding persistent atrial fibrillation (HCC)  S/p DCCV 12/2024  Amiodarone 200 mg daily  Metoprolol ER 50 mg daily  Rate controlled  Plans for ablation and DCCV post recovery from vascular surgery  Follow up with cardiology [facility].  Warfarin 2.5 mg nightly, INR Monday, therapeutic        Primary hypertension  Metoprolol ER 50 mg daily  Bumex 1 mg BID  VS q shift and prn       Pure hypercholesterolemia  Not on statin per notes  Follow up with cardiology as outpatient          Antiphospholipid syndrome (HCC)  Factor V Leiden hx DVT/PE  Resumed warfarin post op  INR therapeutic, repeat Monday        Chronic heart failure with preserved  ejection fraction (HFpEF) ([facility])  EF 50-55 %   Daily weights; Call if weight gain of 2# overnight or 4# in 5 days  2 gram sodium diet  2 liter/day fluid restriction  VS q shift and prn  Monitoring for edema, wt gain, shortness of breath, fatigue  CMP weekly and prn  Follow up with cardiology in 2-4 weeks  Metoprolol ER 50 mg daily  Empagliflozin 10 mg daily  Stopped losartan at home  Bumex 1 mg BID  KCL 20 MEQ daily increased to 40 MEQ BID for severe hypokalemia, repleted today and BMP Monday       Acquired hypothyroidism  Continue levothyroxine 75 mcg daily       Atherosclerosis of native coronary artery of native heart without angina pectoris  Also severe TR plan for structural heart follow up in June/July  S/p CABG 2012  S/p PCI LM/LAD 11/2024  Aspirin DC for warfarin  Plavix continues at 75 mg daily   Follow up with cardiology as outpatient        Hypokalemia  Severe 2.7  Gave 40 MEQ KCL TID yesterday, wnl now  Change KCL to 40 MEQ BID   And repeat BMP on Monday          COPD  No shortness of breath, no wheezing today  No rx     Anxiety/depression  Mood appears appropriate to situation  Alprazoloam 0.25 mg daily prn  Buspar 10 mg BID     Obesity  BMI 30  Follow up as outpatient once cleared for regular exercise  Participate in therapies as able now  Heart healthy diet     Physical Deconditioning/Weakness; at risk for falling  Fall Precautions  GEORGETTE team to establish care plan meeting with patient and POA/family as appropriate  GEORGETTE team/ & discharge planner to assist with establishing safe discharge plan for next level of care  PT/OT evaluate and treat  DC planning with MDT, PERRY, therapies, anticipated DC date : TBD  Services on DC: [facility] RN/PT/OT/SLP/SW  Equipment on DC: Wc/walker/bed/Commode      Pain management  Monitor and assess pain  Norco 5/325 PO Q8 prn  Offer to pre-medicate 30-60 min prior to therapy  Physiatry evaluation with management appreciated     Bowel management  Monitor for  Bms  Senna-docusate BID  prn     Vitamins/supplements as r/t deficiencies  [facility] RD to follow while in rehab; supplementation/diet as per [facility] RD  May continue home supplements     At risk for malnutrition  Dietician consult  Weekly weights  Supplements as indicated  Encourage po intake     DVT Prophylaxis   Encourage exercise and participation with therapy as much as able  warfarin     GI prophylaxis  none     Labs  CBC, CMP, PT/INR Monday      Follow Ups:  PCP within 7 days of DC from rehab  Dr BASIM Hoffmann CV surgery 1 week for wound check  ZANA Montelongo 1 month  Dr Gomez Interventional cardiology in 1-2 weeks      *Established patient; follow-up moderately complex visit/ greater than 30       35 minutes spent w/ patient and staff, including but not limited to/ reviewing present status, needs, abilities with disciplines, reviewing medical records, vital signs, labs, completing medication reconciliation and entering orders for continued care in [facility].    Note to patient: The 21st Century Cures Act makes medical notes like these available to patients in the interest of transparency. However, this is a medical document intended as peer to peer communication. It is written in medical language and may contain abbreviations or verbiage that are unfamiliar. It may appear blunt or direct. Medical documents are intended to carry relevant information, facts as evident, and the clinical opinion of the practitioner who signs the document.    Basia Iglesias, APRN  3/28/2025

## 2025-03-28 NOTE — ASSESSMENT & PLAN NOTE
S/p DCCV 12/2024  Amiodarone 200 mg daily  Metoprolol ER 50 mg daily  Rate controlled  Plans for ablation and DCCV post recovery from vascular surgery  Follow up with cardiology [facility].  Warfarin 2.5 mg nightly, INR Monday, therapeutic

## 2025-03-28 NOTE — ASSESSMENT & PLAN NOTE
PVD s/p elective stent graft of abdominal aorta and Right common iliac  S/P right iliofemoral, profunda and superficial femoral endarterectomy  Dr John Hoffmann surgeon 3/20/2025  Follow up as directed  Wound care as directed  Pain managed with Norco prn

## 2025-03-31 ENCOUNTER — SNF VISIT (OUTPATIENT)
Dept: INTERNAL MEDICINE CLINIC | Age: 80
End: 2025-03-31

## 2025-03-31 VITALS
DIASTOLIC BLOOD PRESSURE: 71 MMHG | HEART RATE: 86 BPM | BODY MASS INDEX: 27 KG/M2 | RESPIRATION RATE: 18 BRPM | SYSTOLIC BLOOD PRESSURE: 105 MMHG | OXYGEN SATURATION: 98 % | WEIGHT: 149 LBS | TEMPERATURE: 98 F

## 2025-03-31 DIAGNOSIS — I50.32 CHRONIC HEART FAILURE WITH PRESERVED EJECTION FRACTION (HFPEF) (HCC): ICD-10-CM

## 2025-03-31 DIAGNOSIS — I74.09 AORTOILIAC OCCLUSIVE DISEASE (HCC): Primary | ICD-10-CM

## 2025-03-31 DIAGNOSIS — E87.6 HYPOKALEMIA: ICD-10-CM

## 2025-03-31 DIAGNOSIS — I10 PRIMARY HYPERTENSION: ICD-10-CM

## 2025-03-31 DIAGNOSIS — N18.4 STAGE 4 CHRONIC KIDNEY DISEASE (HCC): ICD-10-CM

## 2025-03-31 DIAGNOSIS — I48.11 LONGSTANDING PERSISTENT ATRIAL FIBRILLATION (HCC): ICD-10-CM

## 2025-03-31 DIAGNOSIS — Z78.9 DECREASED ACTIVITIES OF DAILY LIVING (ADL): ICD-10-CM

## 2025-03-31 PROCEDURE — 99309 SBSQ NF CARE MODERATE MDM 30: CPT | Performed by: NURSE PRACTITIONER

## 2025-03-31 NOTE — ASSESSMENT & PLAN NOTE
S/p DCCV 12/2024  Amiodarone 200 mg daily  Metoprolol ER 50 mg daily  Rate controlled  Plans for ablation and DCCV post recovery from vascular surgery  Follow up with cardiology [facility].  Warfarin 2.5 mg nightly, INR therapeutic, repeat Thursday

## 2025-03-31 NOTE — PROGRESS NOTES
Kera Reed, 1/7/1945, 80 year old, female    Chief Complaint:    Chief Complaint   Patient presents with    Follow - Up     S/p vascular surgery  CHF  CKD  Afib        Subjective:   TODAY:  Kera is seen today in bed.She is feelling well today. Therapy is going well.   Pain meds are managing the pain and it subsides a little more each day  DW her, Potassium is wnl continue KCL 40 MEQ BID, warfarin is therapeutic.   Will repeat Labs on Thursday.   She is eating well. Slept well last night.   DW nursing. Glucose controlled.    Denies insomnia, fatigue, fever/chills, cough, SOB, dyspnea, angina, palpitations, n/v, diarrhea, constipation, and urinary sxs.      Objective:  /71   Pulse 86   Temp 97.5 °F (36.4 °C)   Resp 18   Wt 149 lb (67.6 kg)   LMP  (LMP Unknown)   SpO2 98%   BMI 27.25 kg/m²                     PHYSICAL EXAM:  GENERAL HEALTH: well developed, well nourished, in no apparent distress  LINES, TUBES, DRAINS:  none  SKIN: warm, dry, multiple bruises BUE, RUE with edema and purple bruising, hematoma right axilla  WOUND: skin tears two on Left UE, one on right UE dressed.  LLE dressings intact  EYES: sclera anicteric, conjunctiva normal; there is no nystagmus, no drainage from eyes  HENT: normocephalic; normal nose, no nasal drainage, mucous membranes pink, moist.  NECK: supple, non tender, FROM  BREAST: deferred  RESPIRATORY:clear, no crackles, no wheezing, no dyspnea, no cough, room air  CARDIOVASCULAR: Irregular rhythm, rate normal,  S1 and S2, no murmurs, no edema  ABDOMEN:  normal active BS+, soft, nondistended; no organomegaly, no masses; nontender, no guarding, no rebound tenderness.  :Deferred  LYMPHATIC:no lymphedema  MUSCULOSKELETAL: no acute synovitis upper or lower extremity  EXTREMITIES/VASCULAR:radial pulses 2+ and dorsalis pedal pulses 2+  NEUROLOGIC: A&OX3, no focal deficits, follows commands  PSYCHIATRIC: calm, cooperative, mood and affect appropriate to  situation    Medications reviewed: Yes      Current Outpatient Medications:     HYDROcodone-acetaminophen 5-325 MG Oral Tab, Take 1 tablet by mouth every 4 (four) hours as needed., Disp: 40 tablet, Rfl: 0    ALPRAZolam 0.25 MG Oral Tab, Take 1 tablet (0.25 mg total) by mouth daily as needed., Disp: 20 tablet, Rfl: 0    HYDROcodone-acetaminophen 5-325 MG Oral Tab, Take 1 tablet by mouth every 8 (eight) hours as needed for Pain., Disp: 30 tablet, Rfl: 0    amiodarone 200 MG Oral Tab, Take 1 tablet (200 mg total) by mouth daily., Disp: 30 tablet, Rfl: 0    metoprolol succinate ER 50 MG Oral Tablet 24 Hr, Take 1 tablet (50 mg total) by mouth Daily Beta Blocker., Disp: 30 tablet, Rfl: 0    warfarin 2.5 MG Oral Tab, Take 1 tablet (2.5 mg total) by mouth nightly., Disp: 30 tablet, Rfl: 0    Naloxone HCl 4 MG/0.1ML Nasal Liquid, 4 mg by Intranasal route as needed (May repeat as needed in other nostril if symptoms persist). If patient remains unresponsive, repeat dose in other nostril 2-5 minutes after first dose., Disp: 1 kit, Rfl: 0    busPIRone 10 MG Oral Tab, Take 1 tablet (10 mg total) by mouth in the morning and 1 tablet (10 mg total) before bedtime., Disp: , Rfl:     bumetanide 1 MG Oral Tab, Take 1 tablet (1 mg total) by mouth BID (Diuretic)., Disp: , Rfl:     Multiple Vitamins-Minerals (MULTIVITAMIN ADULTS 50+ OR), Take 1 tablet by mouth daily., Disp: , Rfl:     empagliflozin (JARDIANCE) 10 MG Oral Tab, Take 1 tablet (10 mg total) by mouth daily., Disp: , Rfl:     Potassium Chloride ER 20 MEQ Oral Tab CR, Take 40 mEq by mouth in the morning and 40 mEq before bedtime., Disp: , Rfl:     clopidogrel 75 MG Oral Tab, Take 1 tablet (75 mg total) by mouth daily., Disp: 90 tablet, Rfl: 1    Levothyroxine Sodium 75 MCG Oral Tab, Take 1 tablet (75 mcg total) by mouth once daily., Disp: 90 tablet, Rfl: 2      Diagnostics reviewed:      Lab Results   Component Value Date    WBC 6.1 03/27/2025    RBC 3.44 (L) 03/27/2025    HGB  11.1 (L) 03/27/2025    HCT 32.7 (L) 03/27/2025    MCV 95.1 03/27/2025    MCH 32.3 03/27/2025    MCHC 33.9 03/27/2025    RDW 16.6 03/27/2025    .0 03/27/2025     Lab Results   Component Value Date    GLU 89 03/31/2025    BUN 26 (H) 03/31/2025    CREATSERUM 0.95 03/31/2025    ANIONGAP 11 03/31/2025    GFR 43 (L) 07/17/2015    CA 9.5 03/31/2025    OSMOCALC 292 03/31/2025    ALKPHO 75 03/27/2025    AST 21 03/27/2025    ALT 11 03/27/2025    BILT 0.6 03/27/2025    TP 5.6 (L) 03/27/2025    ALB 3.6 03/27/2025    GLOBULIN 2.0 03/27/2025    AGRATIO 1.7 02/23/2016     03/31/2025    K 4.3 03/31/2025     03/31/2025    CO2 24.0 03/31/2025     Lab Results   Component Value Date    PTP 29.4 (H) 03/31/2025    INR 2.76 (H) 03/31/2025       Assessment and plan:  Assessment & Plan  Aortoiliac occlusive disease (HCC)  PVD s/p elective stent graft of abdominal aorta and Right common iliac  S/P right iliofemoral, profunda and superficial femoral endarterectomy  Dr John Hoffmann surgeon 3/20/2025  Follow up as directed  Wound care as directed  Pain managed with Norco prn       Stage 4 chronic kidney disease (HCC)  Baseline creat 1.0, 0.95  Avoid nephrotoxic medications  Renal dose meds as needed  CMP weekly and prn       Longstanding persistent atrial fibrillation (HCC)  S/p DCCV 12/2024  Amiodarone 200 mg daily  Metoprolol ER 50 mg daily  Rate controlled  Plans for ablation and DCCV post recovery from vascular surgery  Follow up with cardiology [facility].  Warfarin 2.5 mg nightly, INR therapeutic, repeat Thursday       Primary hypertension  Metoprolol ER 50 mg daily  Bumex 1 mg BID  VS q shift and prn       Chronic heart failure with preserved ejection fraction (HFpEF) (HCC)  EF 50-55 %   Daily weights; Call if weight gain of 2# overnight or 4# in 5 days  2 gram sodium diet, 2 liter/day fluid restriction  VS q shift and prn  Monitoring for edema, wt gain, shortness of breath, fatigue  CMP weekly and prn  Follow up with  cardiology in 2-4 weeks  Metoprolol ER 50 mg daily  Empagliflozin 10 mg daily  Stopped losartan at home  Bumex 1 mg BID  KCL 40 MEQ BID   K wnl now       Hypokalemia  Severe 2.7 now wnl   Gave 40 MEQ KCL TID yesterday, wnl now  KCL to 40 MEQ BID   BMP as indicated       Decreased activities of daily living (ADL)  Physical Deconditioning/Weakness; at risk for falling  Fall Precautions  GEORGETTE team to establish care plan meeting with patient and POA/family as appropriate  GEORGETTE team/ & discharge planner to assist with establishing safe discharge plan for next level of care  PT/OT evaluate and treat  DC planning with MDT, SW, therapies, anticipated DC date : TBD  Services on DC: [facility] RN/PT/OT/SW  Equipment on DC: Walker          COPD  No shortness of breath, no wheezing today  No rx     Anxiety/depression  Mood appears appropriate to situation  Alprazolam 0.25 mg daily prn  Buspar 10 mg BID     Obesity  BMI 30  Follow up as outpatient once cleared for regular exercise  Participate in therapies as able now  Heart healthy diet     Pain management  Monitor and assess pain  Norco 5/325 PO Q4 prn  Offer to pre-medicate 30-60 min prior to therapy  Physiatry evaluation with management appreciated     Bowel management  Monitor for Bms  Senna-docusate BID  prn     Vitamins/supplements as r/t deficiencies  GEORGETTE RD to follow while in rehab; supplementation/diet as per GEORGETTE RD  May continue home supplements     At risk for malnutrition  Dietician consult  Weekly weights  Supplements as indicated  Encourage po intake     DVT Prophylaxis   Encourage exercise and participation with therapy as much as able  warfarin     GI prophylaxis  none     Labs  CBC, CMP, PT/INR Thursday      Follow Ups:  PCP within 7 days of DC from rehab  Dr BASIM Hoffmann CV surgery 1 week for wound check  ZANA Montelongo 1 month  Dr Gomez Interventional cardiology in 1-2 weeks      *Established patient; follow-up moderately complex visit/ greater than 30       35  minutes spent w/ patient and staff, including but not limited to/ reviewing present status, needs, abilities with disciplines, reviewing medical records, vital signs, labs, completing medication reconciliation and entering orders for continued care in [facility].    Note to patient: The 21st Century Cures Act makes medical notes like these available to patients in the interest of transparency. However, this is a medical document intended as peer to peer communication. It is written in medical language and may contain abbreviations or verbiage that are unfamiliar. It may appear blunt or direct. Medical documents are intended to carry relevant information, facts as evident, and the clinical opinion of the practitioner who signs the document.    Basia Iglesias, APRN  3/31/2025

## 2025-03-31 NOTE — ASSESSMENT & PLAN NOTE
Baseline creat 1.0, 0.95  Avoid nephrotoxic medications  Renal dose meds as needed  CMP weekly and prn

## 2025-04-02 ENCOUNTER — SNF VISIT (OUTPATIENT)
Dept: INTERNAL MEDICINE CLINIC | Age: 80
End: 2025-04-02

## 2025-04-02 VITALS
DIASTOLIC BLOOD PRESSURE: 78 MMHG | WEIGHT: 151.31 LBS | TEMPERATURE: 97 F | RESPIRATION RATE: 15 BRPM | SYSTOLIC BLOOD PRESSURE: 115 MMHG | BODY MASS INDEX: 28 KG/M2 | HEART RATE: 91 BPM | OXYGEN SATURATION: 94 %

## 2025-04-02 DIAGNOSIS — I48.11 LONGSTANDING PERSISTENT ATRIAL FIBRILLATION (HCC): ICD-10-CM

## 2025-04-02 DIAGNOSIS — Z78.9 DECREASED ACTIVITIES OF DAILY LIVING (ADL): ICD-10-CM

## 2025-04-02 DIAGNOSIS — I74.09 AORTOILIAC OCCLUSIVE DISEASE (HCC): Primary | ICD-10-CM

## 2025-04-02 DIAGNOSIS — I50.32 CHRONIC HEART FAILURE WITH PRESERVED EJECTION FRACTION (HFPEF) (HCC): ICD-10-CM

## 2025-04-02 DIAGNOSIS — I10 PRIMARY HYPERTENSION: ICD-10-CM

## 2025-04-02 DIAGNOSIS — D68.61 ANTIPHOSPHOLIPID SYNDROME (HCC): ICD-10-CM

## 2025-04-02 DIAGNOSIS — N18.4 STAGE 4 CHRONIC KIDNEY DISEASE (HCC): ICD-10-CM

## 2025-04-02 PROCEDURE — 99310 SBSQ NF CARE HIGH MDM 45: CPT | Performed by: NURSE PRACTITIONER

## 2025-04-02 NOTE — ASSESSMENT & PLAN NOTE
PVD s/p elective stent graft of abdominal aorta and Right common iliac  S/P right iliofemoral, profunda and superficial femoral endarterectomy  Dr John Hoffmann surgeon Surgery on 3/20/2025  Follow up as directed 4/14/25 1230   Wound care BID now with drainage  Augmentin BID for 10 days, monitoring, no systemic signs of infection, no worsening pain   Pain managed with Norco prn

## 2025-04-02 NOTE — PROGRESS NOTES
Kera Reed, 1/7/1945, 80 year old, female    Chief Complaint:    Chief Complaint   Patient presents with    Follow - Up     S/p vascular surgery with drainage  Pain and immobility         Subjective:   TODAY:  Kera is seen today getting back in bed from the bathroom with min assist.   She is feelling well today. Therapy is going well.     DW she and wound care, drainage noted and vascular surgery started Augmentin BID. Monitoring. Dressing changes BID now. She denies any worsening pain, no fevers, no chills, no nausea, no vomiting.  She is eating well. Slept well last night.   DW nursing. Glucose controlled.    Denies insomnia, fatigue, fever/chills, cough, SOB, dyspnea, angina, palpitations, n/v, diarrhea, constipation, and urinary sxs.      Objective:  /78   Pulse 91   Temp 97.3 °F (36.3 °C)   Resp 15   Wt 151 lb 4.8 oz (68.6 kg)   LMP  (LMP Unknown)   SpO2 94%   BMI 27.67 kg/m²                     PHYSICAL EXAM:  GENERAL HEALTH: well developed, well nourished, in no apparent distress  LINES, TUBES, DRAINS:  none  SKIN: warm, dry, multiple bruises BUE, RUE with edema and purple bruising,   WOUND: skin tears one remains on Left UE, one on right UE dressed.  LLE dressings intact  EYES: sclera anicteric, conjunctiva normal; there is no nystagmus, no drainage from eyes  HENT: normocephalic; normal nose, no nasal drainage, mucous membranes pink, moist.  NECK: supple, non tender, FROM  BREAST: deferred  RESPIRATORY:clear, no crackles, no wheezing, no dyspnea, no cough, room air  CARDIOVASCULAR: Irregular rhythm, rate normal,  S1 and S2, no murmurs, no edema  ABDOMEN:  normal active BS+, soft, nondistended; no organomegaly, no masses; nontender, no guarding, no rebound tenderness.  :Deferred  LYMPHATIC:no lymphedema  MUSCULOSKELETAL: no acute synovitis upper or lower extremity  EXTREMITIES/VASCULAR:radial pulses 2+ and dorsalis pedal pulses 2+  NEUROLOGIC: A&OX3, no focal deficits, follows  commands  PSYCHIATRIC: calm, cooperative, mood and affect appropriate to situation    Medications reviewed: Yes      Current Outpatient Medications:     HYDROcodone-acetaminophen 5-325 MG Oral Tab, Take 1 tablet by mouth every 4 (four) hours as needed., Disp: 40 tablet, Rfl: 0    ALPRAZolam 0.25 MG Oral Tab, Take 1 tablet (0.25 mg total) by mouth daily as needed., Disp: 20 tablet, Rfl: 0    HYDROcodone-acetaminophen 5-325 MG Oral Tab, Take 1 tablet by mouth every 8 (eight) hours as needed for Pain., Disp: 30 tablet, Rfl: 0    amiodarone 200 MG Oral Tab, Take 1 tablet (200 mg total) by mouth daily., Disp: 30 tablet, Rfl: 0    metoprolol succinate ER 50 MG Oral Tablet 24 Hr, Take 1 tablet (50 mg total) by mouth Daily Beta Blocker., Disp: 30 tablet, Rfl: 0    warfarin 2.5 MG Oral Tab, Take 1 tablet (2.5 mg total) by mouth nightly., Disp: 30 tablet, Rfl: 0    Naloxone HCl 4 MG/0.1ML Nasal Liquid, 4 mg by Intranasal route as needed (May repeat as needed in other nostril if symptoms persist). If patient remains unresponsive, repeat dose in other nostril 2-5 minutes after first dose., Disp: 1 kit, Rfl: 0    busPIRone 10 MG Oral Tab, Take 1 tablet (10 mg total) by mouth in the morning and 1 tablet (10 mg total) before bedtime., Disp: , Rfl:     bumetanide 1 MG Oral Tab, Take 1 tablet (1 mg total) by mouth BID (Diuretic)., Disp: , Rfl:     Multiple Vitamins-Minerals (MULTIVITAMIN ADULTS 50+ OR), Take 1 tablet by mouth daily., Disp: , Rfl:     empagliflozin (JARDIANCE) 10 MG Oral Tab, Take 1 tablet (10 mg total) by mouth daily., Disp: , Rfl:     Potassium Chloride ER 20 MEQ Oral Tab CR, Take 40 mEq by mouth in the morning and 40 mEq before bedtime., Disp: , Rfl:     clopidogrel 75 MG Oral Tab, Take 1 tablet (75 mg total) by mouth daily., Disp: 90 tablet, Rfl: 1    Levothyroxine Sodium 75 MCG Oral Tab, Take 1 tablet (75 mcg total) by mouth once daily., Disp: 90 tablet, Rfl: 2      Diagnostics reviewed:      Lab Results    Component Value Date    WBC 6.1 03/27/2025    RBC 3.44 (L) 03/27/2025    HGB 11.1 (L) 03/27/2025    HCT 32.7 (L) 03/27/2025    MCV 95.1 03/27/2025    MCH 32.3 03/27/2025    MCHC 33.9 03/27/2025    RDW 16.6 03/27/2025    .0 03/27/2025     Lab Results   Component Value Date    GLU 89 03/31/2025    BUN 26 (H) 03/31/2025    CREATSERUM 0.95 03/31/2025    ANIONGAP 11 03/31/2025    GFR 43 (L) 07/17/2015    CA 9.5 03/31/2025    OSMOCALC 292 03/31/2025    ALKPHO 75 03/27/2025    AST 21 03/27/2025    ALT 11 03/27/2025    BILT 0.6 03/27/2025    TP 5.6 (L) 03/27/2025    ALB 3.6 03/27/2025    GLOBULIN 2.0 03/27/2025    AGRATIO 1.7 02/23/2016     03/31/2025    K 4.3 03/31/2025     03/31/2025    CO2 24.0 03/31/2025     Lab Results   Component Value Date    PTP 29.4 (H) 03/31/2025    INR 2.76 (H) 03/31/2025       Assessment and plan:  Assessment & Plan  Aortoiliac occlusive disease (HCC)  PVD s/p elective stent graft of abdominal aorta and Right common iliac  S/P right iliofemoral, profunda and superficial femoral endarterectomy  Dr John Daughertysh surgeon Surgery on 3/20/2025  Follow up as directed 4/14/25 1230   Wound care BID now with drainage  Augmentin BID for 10 days, monitoring, no systemic signs of infection, no worsening pain   Pain managed with Norco prn       Stage 4 chronic kidney disease (HCC)  Baseline creat 1.0, 0.95  Avoid nephrotoxic medications  Renal dose meds as needed  CMP weekly and prn        Longstanding persistent atrial fibrillation (HCC)  S/p DCCV 12/2024  Amiodarone 200 mg daily  Metoprolol ER 50 mg daily  Rate controlled  Plans for ablation and DCCV post recovery from vascular surgery  Follow up with cardiology [facility].  Warfarin 2.5 mg nightly, INR therapeutic, repeat Thursday       Chronic heart failure with preserved ejection fraction (HFpEF) ([facility])  EF 50-55 %   Daily weights; Call if weight gain of 2# overnight or 4# in 5 days  2 gram sodium diet, 2 liter/day fluid restriction  VS q  shift and prn  Monitoring for edema, wt gain, shortness of breath, fatigue  Weight 149 now 151.3? No edema no shortness of breath, reweigh   CMP weekly and prn  Follow up with cardiology in 2-4 weeks  Metoprolol ER 50 mg daily  Empagliflozin 10 mg daily  Stopped losartan at home  Bumex 1 mg BID  KCL 40 MEQ BID   K wnl now       Primary hypertension  Metoprolol ER 50 mg daily  Bumex 1 mg BID  VS q shift and prn  BP controlled       Antiphospholipid syndrome (HCC)  Warfarin 2.5 mg nightly, INR therapeutic, repeat Thursday       Decreased activities of daily living (ADL)  Physical Deconditioning/Weakness; at risk for falling  Fall Precautions  GEORGETTE team/ & discharge planner to assist with establishing safe discharge plan for next level of care  PT/OT evaluate and treat  DC planning with MDT, SW, therapies, anticipated DC date : TBD  Services on DC: [facility] RN/PT/OT/SW  Equipment on DC: Walker          COPD  No shortness of breath, no wheezing today  No rx     Anxiety/depression  Mood appears appropriate to situation  Alprazolam 0.25 mg daily prn  Buspar 10 mg BID     Obesity  BMI 30  Follow up as outpatient once cleared for regular exercise  Participate in therapies as able now  Heart healthy diet     Pain management  Monitor and assess pain  Norco 5/325 PO Q4 prn  Offer to pre-medicate 30-60 min prior to therapy  Physiatry evaluation with management appreciated     Bowel management  Monitor for Bms  Senna-docusate BID  prn     Vitamins/supplements as r/t deficiencies  GEORGETTE RD to follow while in rehab; supplementation/diet as per GEORGETTE RD  May continue home supplements     At risk for malnutrition  Dietician consult  Weekly weights  Supplements as indicated  Encourage po intake     DVT Prophylaxis   Encourage exercise and participation with therapy as much as able  warfarin     GI prophylaxis  none     Labs  CBC, CMP, PT/INR Thursday      Follow Ups:  PCP within 7 days of DC from rehab  Dr BASIM Hoffmann CV surgery 1  week for wound check 4/14/25   [facility] 1 month  Dr Gomez Interventional cardiology in 1-2 weeks       *Established patient; follow-up complex visit/ greater than 40    47 minutes spent w/ patient and staff, including but not limited to/ reviewing present status, needs, abilities with disciplines, reviewing medical records, vital signs, labs, completing medication reconciliation and entering orders for continued care in [facility].  .    Note to patient: The 21st Century Cures Act makes medical notes like these available to patients in the interest of transparency. However, this is a medical document intended as peer to peer communication. It is written in medical language and may contain abbreviations or verbiage that are unfamiliar. It may appear blunt or direct. Medical documents are intended to carry relevant information, facts as evident, and the clinical opinion of the practitioner who signs the document.    Basia Iglesias, APRN  4/2/2025

## 2025-04-03 ENCOUNTER — SNF VISIT (OUTPATIENT)
Dept: INTERNAL MEDICINE CLINIC | Age: 80
End: 2025-04-03

## 2025-04-03 DIAGNOSIS — B35.4 TINEA CORPORIS: ICD-10-CM

## 2025-04-03 DIAGNOSIS — K52.1 DIARRHEA DUE TO DRUG: Primary | ICD-10-CM

## 2025-04-03 PROCEDURE — 99308 SBSQ NF CARE LOW MDM 20: CPT | Performed by: NURSE PRACTITIONER

## 2025-04-03 NOTE — PROGRESS NOTES
Kera Reed, 1/7/1945, 80 year old, female    Chief Complaint:    Chief Complaint   Patient presents with    Follow - Up     Diarrhea on Augmentin  Circular rash on BLE        Subjective:   TODAY:  Kera is seen today in bed at the request of nursing.  She is having diarrhea on the Augmentin. Just started yesterday. DW her increase fluids, will start probiotic daily, monitor closely and if persists will change.   She is feelling well today. Therapy is going well.     She also has a recurrence of a rash on her BLE. She has had it in the past and it takes a long time to go away, red and itchy. Agree it appears tinea. Will add cream BID and monitor.     Denies insomnia, fatigue, fever/chills, cough, SOB, dyspnea, angina, palpitations, n/v, diarrhea, constipation, and urinary sxs.      Objective:  LMP  (LMP Unknown)                     PHYSICAL EXAM:  GENERAL HEALTH: well developed, well nourished, in no apparent distress  LINES, TUBES, DRAINS:  none  SKIN: warm, dry, multiple bruises BUE, RUE with edema and purple bruising,   Now with circular raised erythemic rash on both lower legs with raised edges and clearing centers  WOUND: skin tears one remains on Left UE, one on right UE dressed.  LLE dressings intact  EYES: sclera anicteric, conjunctiva normal; there is no nystagmus, no drainage from eyes  HENT: normocephalic; normal nose, no nasal drainage, mucous membranes pink, moist.  NECK: supple, non tender, FROM  BREAST: deferred  RESPIRATORY:clear, no crackles, no wheezing, no dyspnea, no cough, room air  CARDIOVASCULAR: Irregular rhythm, rate normal,  S1 and S2, no murmurs, no edema  ABDOMEN:  normal active BS+, soft, nondistended; no organomegaly, no masses; nontender, no guarding, no rebound tenderness.  :Deferred  LYMPHATIC:no lymphedema  MUSCULOSKELETAL: no acute synovitis upper or lower extremity  EXTREMITIES/VASCULAR:radial pulses 2+ and dorsalis pedal pulses 2+  NEUROLOGIC: A&OX3, no focal deficits, follows  commands  PSYCHIATRIC: calm, cooperative, mood and affect appropriate to situation    Therapy Updates:   Ambulating  RW and SBA  ADLs Min assist  Transfers SBA, 2 stairs SBA  DC planning: home with spouse 14 stairs, home health care    Medications reviewed: Yes      Current Outpatient Medications:     HYDROcodone-acetaminophen 5-325 MG Oral Tab, Take 1 tablet by mouth every 4 (four) hours as needed., Disp: 40 tablet, Rfl: 0    ALPRAZolam 0.25 MG Oral Tab, Take 1 tablet (0.25 mg total) by mouth daily as needed., Disp: 20 tablet, Rfl: 0    HYDROcodone-acetaminophen 5-325 MG Oral Tab, Take 1 tablet by mouth every 8 (eight) hours as needed for Pain., Disp: 30 tablet, Rfl: 0    amiodarone 200 MG Oral Tab, Take 1 tablet (200 mg total) by mouth daily., Disp: 30 tablet, Rfl: 0    metoprolol succinate ER 50 MG Oral Tablet 24 Hr, Take 1 tablet (50 mg total) by mouth Daily Beta Blocker., Disp: 30 tablet, Rfl: 0    warfarin 2.5 MG Oral Tab, Take 1 tablet (2.5 mg total) by mouth nightly., Disp: 30 tablet, Rfl: 0    Naloxone HCl 4 MG/0.1ML Nasal Liquid, 4 mg by Intranasal route as needed (May repeat as needed in other nostril if symptoms persist). If patient remains unresponsive, repeat dose in other nostril 2-5 minutes after first dose., Disp: 1 kit, Rfl: 0    busPIRone 10 MG Oral Tab, Take 1 tablet (10 mg total) by mouth in the morning and 1 tablet (10 mg total) before bedtime., Disp: , Rfl:     bumetanide 1 MG Oral Tab, Take 1 tablet (1 mg total) by mouth BID (Diuretic)., Disp: , Rfl:     Multiple Vitamins-Minerals (MULTIVITAMIN ADULTS 50+ OR), Take 1 tablet by mouth daily., Disp: , Rfl:     empagliflozin (JARDIANCE) 10 MG Oral Tab, Take 1 tablet (10 mg total) by mouth daily., Disp: , Rfl:     Potassium Chloride ER 20 MEQ Oral Tab CR, Take 40 mEq by mouth in the morning and 40 mEq before bedtime., Disp: , Rfl:     clopidogrel 75 MG Oral Tab, Take 1 tablet (75 mg total) by mouth daily., Disp: 90 tablet, Rfl: 1    Levothyroxine  Sodium 75 MCG Oral Tab, Take 1 tablet (75 mcg total) by mouth once daily., Disp: 90 tablet, Rfl: 2      Diagnostics reviewed:      Lab Results   Component Value Date    WBC 7.6 04/03/2025    RBC 3.68 (L) 04/03/2025    HGB 11.9 (L) 04/03/2025    HCT 35.5 04/03/2025    MCV 96.5 04/03/2025    MCH 32.3 04/03/2025    MCHC 33.5 04/03/2025    RDW 16.4 04/03/2025    .0 04/03/2025     Lab Results   Component Value Date    GLU 90 04/03/2025    BUN 25 (H) 04/03/2025    CREATSERUM 0.90 04/03/2025    ANIONGAP 10 04/03/2025    GFR 43 (L) 07/17/2015    CA 9.6 04/03/2025    OSMOCALC 294 04/03/2025    ALKPHO 75 03/27/2025    AST 21 03/27/2025    ALT 11 03/27/2025    BILT 0.6 03/27/2025    TP 5.6 (L) 03/27/2025    ALB 3.6 03/27/2025    GLOBULIN 2.0 03/27/2025    AGRATIO 1.7 02/23/2016     04/03/2025    K 4.4 04/03/2025     04/03/2025    CO2 27.0 04/03/2025     Lab Results   Component Value Date    PTP 25.0 (H) 04/03/2025    INR 2.24 (H) 04/03/2025       Assessment and plan:  Assessment & Plan  Diarrhea due to drug  Started on Augmentin for wound drainage  Increase fluid intake  No bowel meds  Start probiotic daily  Monitor closely, if not improved may need to change medication       Tinea corporis  BLE  Hx of as well  Adding clotrimazole BID  Monitoring          COPD  No shortness of breath, no wheezing today  No rx     Anxiety/depression  Mood appears appropriate to situation  Alprazolam 0.25 mg daily prn  Buspar 10 mg BID     Obesity  BMI 30  Follow up as outpatient once cleared for regular exercise  Participate in therapies as able now  Heart healthy diet     Pain management  Monitor and assess pain  Norco 5/325 PO Q4 prn  Offer to pre-medicate 30-60 min prior to therapy  Physiatry evaluation with management appreciated     Bowel management  Monitor for Bms  Senna-docusate BID  prn     Vitamins/supplements as r/t deficiencies  GEORGETTE RD to follow while in rehab; supplementation/diet as per GEORGETTE RD  May continue home  supplements     At risk for malnutrition  Dietician consult  Weekly weights  Supplements as indicated  Encourage po intake     DVT Prophylaxis   Encourage exercise and participation with therapy as much as able  warfarin     GI prophylaxis  none     Labs  CBC, CMP, PT/INR Thursday      Follow Ups:  PCP within 7 days of DC from rehab  Dr BASIM Hoffmann CV surgery 1 week for wound check 4/14/25   [facility] [place] 1 month  Dr Gomez Interventional cardiology in 1-2 weeks       *Established patient; follow-up mildly complex visit/ greater than 20    28 minutes spent w/ patient and staff, including but not limited to/ reviewing present status, needs, abilities with disciplines, reviewing medical records, vital signs, labs, completing medication reconciliation and entering orders for continued care in [facility].    Note to patient: The 21st Century Cures Act makes medical notes like these available to patients in the interest of transparency. However, this is a medical document intended as peer to peer communication. It is written in medical language and may contain abbreviations or verbiage that are unfamiliar. It may appear blunt or direct. Medical documents are intended to carry relevant information, facts as evident, and the clinical opinion of the practitioner who signs the document.    Basia Iglesias, APRN  4/3/2025

## 2025-04-04 ENCOUNTER — EXTERNAL FACILITY (OUTPATIENT)
Dept: FAMILY MEDICINE CLINIC | Facility: CLINIC | Age: 80
End: 2025-04-04

## 2025-04-04 DIAGNOSIS — I25.118 ATHEROSCLEROSIS OF NATIVE CORONARY ARTERY OF NATIVE HEART WITH OTHER FORM OF ANGINA PECTORIS: ICD-10-CM

## 2025-04-04 DIAGNOSIS — Z91.89 AT HIGH RISK FOR MALNUTRITION: ICD-10-CM

## 2025-04-04 DIAGNOSIS — G89.29 CHRONIC RIGHT-SIDED LOW BACK PAIN WITHOUT SCIATICA: ICD-10-CM

## 2025-04-04 DIAGNOSIS — R53.81 PHYSICAL DECONDITIONING: ICD-10-CM

## 2025-04-04 DIAGNOSIS — F13.20 EPISODIC BENZODIAZEPINE DEPENDENCE (HCC): ICD-10-CM

## 2025-04-04 DIAGNOSIS — Z86.718 HISTORY OF DVT (DEEP VEIN THROMBOSIS): ICD-10-CM

## 2025-04-04 DIAGNOSIS — I74.09 AORTOILIAC OCCLUSIVE DISEASE (HCC): Primary | ICD-10-CM

## 2025-04-04 DIAGNOSIS — M50.30 DDD (DEGENERATIVE DISC DISEASE), CERVICAL: ICD-10-CM

## 2025-04-04 DIAGNOSIS — M54.50 CHRONIC RIGHT-SIDED LOW BACK PAIN WITHOUT SCIATICA: ICD-10-CM

## 2025-04-04 DIAGNOSIS — I70.0 ATHEROSCLEROSIS OF AORTA: ICD-10-CM

## 2025-04-04 DIAGNOSIS — I10 PRIMARY HYPERTENSION: ICD-10-CM

## 2025-04-04 DIAGNOSIS — F33.1 MAJOR DEPRESSIVE DISORDER, RECURRENT EPISODE, MODERATE (HCC): ICD-10-CM

## 2025-04-04 DIAGNOSIS — R53.1 GENERALIZED WEAKNESS: ICD-10-CM

## 2025-04-04 DIAGNOSIS — F41.1 GAD (GENERALIZED ANXIETY DISORDER): ICD-10-CM

## 2025-04-04 DIAGNOSIS — F33.41 DEPRESSION, MAJOR, RECURRENT, IN PARTIAL REMISSION: ICD-10-CM

## 2025-04-04 DIAGNOSIS — I48.11 LONGSTANDING PERSISTENT ATRIAL FIBRILLATION (HCC): ICD-10-CM

## 2025-04-04 DIAGNOSIS — M54.41 ACUTE RIGHT-SIDED LOW BACK PAIN WITH RIGHT-SIDED SCIATICA: ICD-10-CM

## 2025-04-04 DIAGNOSIS — J44.9 CHRONIC OBSTRUCTIVE PULMONARY DISEASE, UNSPECIFIED COPD TYPE (HCC): ICD-10-CM

## 2025-04-04 DIAGNOSIS — Z86.711 HISTORY OF PULMONARY EMBOLUS (PE): ICD-10-CM

## 2025-04-04 DIAGNOSIS — K21.9 GASTROESOPHAGEAL REFLUX DISEASE WITHOUT ESOPHAGITIS: ICD-10-CM

## 2025-04-04 DIAGNOSIS — J01.01 ACUTE RECURRENT MAXILLARY SINUSITIS: ICD-10-CM

## 2025-04-04 DIAGNOSIS — N18.4 TYPE 2 DIABETES MELLITUS WITH STAGE 4 CHRONIC KIDNEY DISEASE, WITHOUT LONG-TERM CURRENT USE OF INSULIN (HCC): ICD-10-CM

## 2025-04-04 DIAGNOSIS — E11.22 TYPE 2 DIABETES MELLITUS WITH STAGE 4 CHRONIC KIDNEY DISEASE, WITHOUT LONG-TERM CURRENT USE OF INSULIN (HCC): ICD-10-CM

## 2025-04-04 PROCEDURE — 99306 1ST NF CARE HIGH MDM 50: CPT | Performed by: FAMILY MEDICINE

## 2025-04-04 PROCEDURE — 99499 UNLISTED E&M SERVICE: CPT | Performed by: FAMILY MEDICINE

## 2025-04-07 ENCOUNTER — SNF VISIT (OUTPATIENT)
Dept: INTERNAL MEDICINE CLINIC | Age: 80
End: 2025-04-07

## 2025-04-07 VITALS
RESPIRATION RATE: 18 BRPM | BODY MASS INDEX: 27 KG/M2 | WEIGHT: 149.63 LBS | DIASTOLIC BLOOD PRESSURE: 66 MMHG | HEART RATE: 97 BPM | TEMPERATURE: 98 F | SYSTOLIC BLOOD PRESSURE: 122 MMHG | OXYGEN SATURATION: 97 %

## 2025-04-07 DIAGNOSIS — I50.32 CHRONIC HEART FAILURE WITH PRESERVED EJECTION FRACTION (HFPEF) (HCC): ICD-10-CM

## 2025-04-07 DIAGNOSIS — Z78.9 DECREASED ACTIVITIES OF DAILY LIVING (ADL): ICD-10-CM

## 2025-04-07 DIAGNOSIS — I74.09 AORTOILIAC OCCLUSIVE DISEASE (HCC): ICD-10-CM

## 2025-04-07 DIAGNOSIS — N18.4 STAGE 4 CHRONIC KIDNEY DISEASE (HCC): ICD-10-CM

## 2025-04-07 DIAGNOSIS — D68.61 ANTIPHOSPHOLIPID SYNDROME (HCC): ICD-10-CM

## 2025-04-07 DIAGNOSIS — K52.1 DIARRHEA DUE TO DRUG: ICD-10-CM

## 2025-04-07 DIAGNOSIS — B35.4 TINEA CORPORIS: Primary | ICD-10-CM

## 2025-04-07 PROBLEM — R29.818 ROMBERG'S TEST POSITIVE: Status: RESOLVED | Noted: 2019-07-12 | Resolved: 2025-04-07

## 2025-04-07 PROBLEM — Z86.711 HISTORY OF PULMONARY EMBOLUS (PE): Status: ACTIVE | Noted: 2025-04-07

## 2025-04-07 PROBLEM — Z86.718 HISTORY OF DVT (DEEP VEIN THROMBOSIS): Status: ACTIVE | Noted: 2025-04-07

## 2025-04-07 PROBLEM — R07.2 PRECORDIAL PAIN: Status: RESOLVED | Noted: 2019-03-01 | Resolved: 2025-04-07

## 2025-04-07 PROBLEM — R19.7 DIARRHEA, UNSPECIFIED TYPE: Status: RESOLVED | Noted: 2018-07-11 | Resolved: 2025-04-07

## 2025-04-07 PROBLEM — F33.1 MAJOR DEPRESSIVE DISORDER, RECURRENT EPISODE, MODERATE (HCC): Status: ACTIVE | Noted: 2025-04-07

## 2025-04-07 PROBLEM — R42 DIZZINESS: Status: RESOLVED | Noted: 2019-07-12 | Resolved: 2025-04-07

## 2025-04-07 PROBLEM — R53.81 PHYSICAL DECONDITIONING: Status: ACTIVE | Noted: 2025-04-07

## 2025-04-07 PROBLEM — Z91.89 AT HIGH RISK FOR MALNUTRITION: Status: ACTIVE | Noted: 2025-04-07

## 2025-04-07 PROBLEM — M54.2 NECK PAIN: Status: RESOLVED | Noted: 2018-03-06 | Resolved: 2025-04-07

## 2025-04-07 PROBLEM — M25.511 ACUTE PAIN OF RIGHT SHOULDER: Status: RESOLVED | Noted: 2018-03-06 | Resolved: 2025-04-07

## 2025-04-07 PROBLEM — R53.1 GENERALIZED WEAKNESS: Status: ACTIVE | Noted: 2025-04-07

## 2025-04-07 PROCEDURE — 99309 SBSQ NF CARE MODERATE MDM 30: CPT | Performed by: NURSE PRACTITIONER

## 2025-04-07 NOTE — PROGRESS NOTES
Kera Reed, 1/7/1945, 80 year old, female    Chief Complaint:    Chief Complaint   Patient presents with    Follow - Up     S/p vascular surgery  Tinea corporis         Subjective:   TODAY:  Kera is seen today in bed. She is feeling well. Pain continues to improve. No more diarrhea. Legs are feeling less itchy, rash is slowly improving.   She is sleeping OK. Eating OK.   Therapy is helping.   DW nursing and patient, still with some incisional drainage.     Denies insomnia, fatigue, fever/chills, cough, SOB, dyspnea, angina, palpitations, n/v, diarrhea, constipation, and urinary sxs.      Objective:  /66   Pulse 97   Temp 97.7 °F (36.5 °C)   Resp 18   Wt 149 lb 9.6 oz (67.9 kg)   LMP  (LMP Unknown)   SpO2 97%   BMI 27.36 kg/m²                     PHYSICAL EXAM:  GENERAL HEALTH: well developed, well nourished, in no apparent distress  LINES, TUBES, DRAINS:  none  SKIN: warm, dry, multiple bruises BUE, RUE with edema and purple bruising,   Now with circular raised erythemic rash on both lower legs with raised edges and clearing centers appears slight improvement and no new areas.   WOUND: skin tears one remains on Left UE, one on right UE dressed.  LLE dressings intact  EYES: sclera anicteric, conjunctiva normal; there is no nystagmus, no drainage from eyes  HENT: normocephalic; normal nose, no nasal drainage, mucous membranes pink, moist.  NECK: supple, non tender, FROM  BREAST: deferred  RESPIRATORY:clear, no crackles, no wheezing, no dyspnea, no cough, room air  CARDIOVASCULAR: Irregular rhythm, rate normal,  S1 and S2, no murmurs, no edema  ABDOMEN:  normal active BS+, soft, nondistended; no organomegaly, no masses; nontender, no guarding, no rebound tenderness.  :Deferred  LYMPHATIC:no lymphedema  MUSCULOSKELETAL: no acute synovitis upper or lower extremity  EXTREMITIES/VASCULAR:radial pulses 2+ and dorsalis pedal pulses 2+  NEUROLOGIC: A&OX3, no focal deficits, follows commands  PSYCHIATRIC:  calm, cooperative, mood and affect appropriate to situation    Therapy Updates:   Ambulating  RW and SBA  ADLs Min assist  Transfers SBA, 2 stairs SBA  DC planning: home with spouse 14 stairs, home health care    Medications reviewed: Yes      Current Outpatient Medications:     warfarin 2.5 MG Oral Tab, Take 1 tablet (2.5 mg total) by mouth nightly. (Patient taking differently: Take 3 mg by mouth nightly.), Disp: 30 tablet, Rfl: 0    HYDROcodone-acetaminophen 5-325 MG Oral Tab, Take 1 tablet by mouth every 4 (four) hours as needed., Disp: 40 tablet, Rfl: 0    ALPRAZolam 0.25 MG Oral Tab, Take 1 tablet (0.25 mg total) by mouth daily as needed., Disp: 20 tablet, Rfl: 0    HYDROcodone-acetaminophen 5-325 MG Oral Tab, Take 1 tablet by mouth every 8 (eight) hours as needed for Pain., Disp: 30 tablet, Rfl: 0    amiodarone 200 MG Oral Tab, Take 1 tablet (200 mg total) by mouth daily., Disp: 30 tablet, Rfl: 0    metoprolol succinate ER 50 MG Oral Tablet 24 Hr, Take 1 tablet (50 mg total) by mouth Daily Beta Blocker., Disp: 30 tablet, Rfl: 0    Naloxone HCl 4 MG/0.1ML Nasal Liquid, 4 mg by Intranasal route as needed (May repeat as needed in other nostril if symptoms persist). If patient remains unresponsive, repeat dose in other nostril 2-5 minutes after first dose., Disp: 1 kit, Rfl: 0    busPIRone 10 MG Oral Tab, Take 1 tablet (10 mg total) by mouth in the morning and 1 tablet (10 mg total) before bedtime., Disp: , Rfl:     bumetanide 1 MG Oral Tab, Take 1 tablet (1 mg total) by mouth BID (Diuretic)., Disp: , Rfl:     Multiple Vitamins-Minerals (MULTIVITAMIN ADULTS 50+ OR), Take 1 tablet by mouth daily., Disp: , Rfl:     empagliflozin (JARDIANCE) 10 MG Oral Tab, Take 1 tablet (10 mg total) by mouth daily., Disp: , Rfl:     Potassium Chloride ER 20 MEQ Oral Tab CR, Take 40 mEq by mouth in the morning and 40 mEq before bedtime., Disp: , Rfl:     clopidogrel 75 MG Oral Tab, Take 1 tablet (75 mg total) by mouth daily., Disp:  90 tablet, Rfl: 1    Levothyroxine Sodium 75 MCG Oral Tab, Take 1 tablet (75 mcg total) by mouth once daily., Disp: 90 tablet, Rfl: 2      Diagnostics reviewed:      Lab Results   Component Value Date    WBC 7.6 04/03/2025    RBC 3.68 (L) 04/03/2025    HGB 11.9 (L) 04/03/2025    HCT 35.5 04/03/2025    MCV 96.5 04/03/2025    MCH 32.3 04/03/2025    MCHC 33.5 04/03/2025    RDW 16.4 04/03/2025    .0 04/03/2025     Lab Results   Component Value Date    GLU 90 04/03/2025    BUN 25 (H) 04/03/2025    CREATSERUM 0.90 04/03/2025    ANIONGAP 10 04/03/2025    GFR 43 (L) 07/17/2015    CA 9.6 04/03/2025    OSMOCALC 294 04/03/2025    ALKPHO 75 03/27/2025    AST 21 03/27/2025    ALT 11 03/27/2025    BILT 0.6 03/27/2025    TP 5.6 (L) 03/27/2025    ALB 3.6 03/27/2025    GLOBULIN 2.0 03/27/2025    AGRATIO 1.7 02/23/2016     04/03/2025    K 4.4 04/03/2025     04/03/2025    CO2 27.0 04/03/2025     Lab Results   Component Value Date    PTP 20.7 (H) 04/07/2025    INR 1.76 (H) 04/07/2025       Assessment and plan:  Assessment & Plan  Tinea corporis  Improving  Clotrimazole BID  BLE       Aortoiliac occlusive disease (HCC)  PVD s/p elective stent graft of abdominal aorta and Right common iliac  S/P right iliofemoral, profunda and superficial femoral endarterectomy  Dr John Daughertysh Surgery on 3/20/2025  Follow up as directed 4/14/25 1230   Wound care BID now with drainage  Augmentin BID for 10 days, monitoring, no systemic signs of infection, no worsening pain   Pain managed with Norco prn       Stage 4 chronic kidney disease (HCC)  Baseline creat 1.0, 0.95, 0.90  Avoid nephrotoxic medications  Renal dose meds as needed  CMP weekly and prn       Antiphospholipid syndrome (HCC)  INR 1.76  Warfarin increase to 3 mg PO at bedtime  Repeat INR on Thursday  On Augmentin now        Chronic heart failure with preserved ejection fraction (HFpEF) ([facility])  EF 50-55 %   Daily weights; Call if weight gain of 2# overnight or 4# in 5  days  2 gram sodium diet, 2 liter/day fluid restriction  VS q shift and prn  Monitoring for edema, wt gain, shortness of breath, fatigue  Weight 149 now 149.6 lbs  CMP weekly and prn  Follow up with cardiology in 2-4 weeks  Metoprolol ER 50 mg daily  Empagliflozin 10 mg daily  Stopped losartan at home  Bumex 1 mg BID  KCL 40 MEQ BID   K wnl now       Decreased activities of daily living (ADL)  Physical Deconditioning/Weakness; at risk for falling  Fall Precautions  PT/OT with improvement   DC planning with MDT, SW, therapies, anticipated DC date : TBD  Services on DC: HHC RN/PT/OT/SW  Equipment on DC: Walker        Diarrhea due to drug  Resolving now  On probiotic daily with Augmentin         COPD  No shortness of breath, no wheezing today  No rx     Anxiety/depression  Mood appears appropriate to situation  Alprazolam 0.25 mg daily prn  Buspar 10 mg BID     Obesity  BMI 30  Follow up as outpatient once cleared for regular exercise  Participate in therapies as able now  Heart healthy diet     Pain management  Monitor and assess pain  Norco 5/325 PO Q4 prn  Offer to pre-medicate 30-60 min prior to therapy  Physiatry evaluation with management appreciated     Bowel management  Monitor for Bms  Senna-docusate BID  prn     Vitamins/supplements as r/t deficiencies  GEORGETTE RD to follow while in rehab; supplementation/diet as per GEORGETTE RD  May continue home supplements     At risk for malnutrition  Dietician consult  Weekly weights  Supplements as indicated  Encourage po intake     DVT Prophylaxis   Encourage exercise and participation with therapy as much as able  warfarin     GI prophylaxis  none     Labs  CBC, CMP, PT/INR Thursday      Follow Ups:  PCP within 7 days of DC from rehab  Dr BASIM Hoffmann CV surgery 4/14/25   [facility] 1 month  Dr Gomez Interventional cardiology in 1-2 weeks      *Established patient; follow-up moderately complex visit/ greater than 30     37 minutes spent w/ patient and staff, including but not  limited to/ reviewing present status, needs, abilities with disciplines, reviewing medical records, vital signs, labs, completing medication reconciliation and entering orders for continued care in [facility].      Note to patient: The 21st Century Cures Act makes medical notes like these available to patients in the interest of transparency. However, this is a medical document intended as peer to peer communication. It is written in medical language and may contain abbreviations or verbiage that are unfamiliar. It may appear blunt or direct. Medical documents are intended to carry relevant information, facts as evident, and the clinical opinion of the practitioner who signs the document.    Basia Iglesias, APRN  4/7/2025

## 2025-04-07 NOTE — ASSESSMENT & PLAN NOTE
PVD s/p elective stent graft of abdominal aorta and Right common iliac  S/P right iliofemoral, profunda and superficial femoral endarterectomy  Dr John Hoffmann Surgery on 3/20/2025  Follow up as directed 4/14/25 1230   Wound care BID now with drainage  Augmentin BID for 10 days, monitoring, no systemic signs of infection, no worsening pain   Pain managed with Norco prn

## 2025-04-07 NOTE — PROGRESS NOTES
Skilled Nursing Facility       Kera Reed Author: Ye Godfrey MD     1945 MRN [id number]   [facility]  Admission 3/20/25      [facility] Discharge 3/26/25 PCP Daya Chandler MD   [facility] Discharge 3/26/25       Date of Admission: 3/26/25    Facility: [facility]      Expected Length of Stay:   3 weeks       HPI:    Kera Reed is a 80 year old female admitted to SNF for sub-acute rehabilitation.      Chief Complaint:   Chief Complaint   Patient presents with    Follow - Up     [place], H&P         HPI   80-year-old female status post right iliofemoral profundofemoral and superficial femoral endarterectomy with vein patch and stent graft of the abdominal aorta and right common iliac artery with preceding severe lower extremity pain with ulceration to the right foot.  Patient has past medical history of coronary artery disease with a history of previous CABG back in  status post PCI of left mamillary and LAD, ICM, PAF, HTN, previous PE and DVT  Patient is back on Coumadin watching INR has a history of DVT and PE with possible factor V Leyden factor with a history of CHF with stable fluid levels at this time.  Currently on BB, amiodarone, and couamdin as above for PAF.   COPD is stable, history of Etoh Dependence in the past with STEFFEN/depression at stable levels.       Allergies:  She has No Known Allergies.    Current Meds:  Current Outpatient Medications on File Prior to Visit   Medication Sig    HYDROcodone-acetaminophen 5-325 MG Oral Tab Take 1 tablet by mouth every 4 (four) hours as needed.    ALPRAZolam 0.25 MG Oral Tab Take 1 tablet (0.25 mg total) by mouth daily as needed.    HYDROcodone-acetaminophen 5-325 MG Oral Tab Take 1 tablet by mouth every 8 (eight) hours as needed for Pain.    amiodarone 200 MG Oral Tab Take 1 tablet (200 mg total) by mouth daily.    metoprolol succinate ER 50 MG Oral Tablet 24 Hr Take 1 tablet (50 mg total) by mouth Daily Beta Blocker.     warfarin 2.5 MG Oral Tab Take 1 tablet (2.5 mg total) by mouth nightly.    Naloxone HCl 4 MG/0.1ML Nasal Liquid 4 mg by Intranasal route as needed (May repeat as needed in other nostril if symptoms persist). If patient remains unresponsive, repeat dose in other nostril 2-5 minutes after first dose.    busPIRone 10 MG Oral Tab Take 1 tablet (10 mg total) by mouth in the morning and 1 tablet (10 mg total) before bedtime.    bumetanide 1 MG Oral Tab Take 1 tablet (1 mg total) by mouth BID (Diuretic).    Multiple Vitamins-Minerals (MULTIVITAMIN ADULTS 50+ OR) Take 1 tablet by mouth daily.    empagliflozin (JARDIANCE) 10 MG Oral Tab Take 1 tablet (10 mg total) by mouth daily.    Potassium Chloride ER 20 MEQ Oral Tab CR Take 40 mEq by mouth in the morning and 40 mEq before bedtime.    clopidogrel 75 MG Oral Tab Take 1 tablet (75 mg total) by mouth daily.    Levothyroxine Sodium 75 MCG Oral Tab Take 1 tablet (75 mcg total) by mouth once daily.     No current facility-administered medications on file prior to visit.         HISTORY:  She  has a past medical history of Allergic rhinitis, Congestive heart disease (HCC), Coronary atherosclerosis, Deep vein thrombosis (HCC), Disorder of thyroid, Factor V deficiency (HCC), High blood pressure, High cholesterol, History of blood transfusion, Hypothyroidism, Pulmonary embolism (HCC), and Shortness of breath.    She  has a past surgical history that includes hysterectomy; knee replacement surgery; bypass surgery; colonoscopy (07/23/2018); cabg; total knee replacement; and colonoscopy.    She Family history is unknown by patient.   She  reports that she quit smoking about 25 years ago. Her smoking use included cigarettes. She started smoking about 65 years ago. She has a 40 pack-year smoking history. She has never used smokeless tobacco. She reports that she does not currently use alcohol. She reports that she does not use drugs.     ROS:   A comprehensive 14 point review of  systems was completed.     Pertinent positives and negatives noted in the HPI.      PHYSICAL EXAM:   Estimated body mass index is 27.67 kg/m² as calculated from the following:    Height as of 3/11/25: 5' 2\" (1.575 m).    Weight as of 4/2/25: 151 lb 4.8 oz (68.6 kg).     Lines, Drains, wound: NA      Vitals are stable and reviewed from PCC and nurse note.   Alert  +weakness  No acute distress  Normocephalic and atraumatic  No nasal congestion or rhinorrhea.   No acute respiratory distress, no s/s of stridor.   Normal rate and BP is stable.   +weakness + motor deficit  No jaundice, normal sclera.  No acute abdomen distension with no ascites, flat abdomen  +gait instability   No pallor, no new rash.   Arthralgias     Medication Reviewed: in [facility]    Labs and Imaging: XR CHEST AP PORTABLE  (CPT=71045)    Result Date: 3/21/2025  PROCEDURE:  XR CHEST AP PORTABLE  (CPT=71045)  TECHNIQUE:  AP chest radiograph was obtained.  COMPARISON:  DALE HERNANDEZ, XR CHEST PA + LAT CHEST (CPT=71046), 11/13/2024, 10:46 AM.  INDICATIONS:  hypoxia on 2 L NC  PATIENT STATED HISTORY: (As transcribed by Technologist)  Patient offered no additional history at this time.     FINDINGS:  Moderate cardiomegaly stable from prior imaging.  Stable changes of prior CABG.  Lungs are clear.  No significant pleural disease.  Normal pulmonary vasculature.            CONCLUSION:  Stable cardiomegaly.  No acute cardiopulmonary process noted.  Lungs are clear.   LOCATION:  [facility]      Dictated by (CST): Ethan Oscar DO on 3/21/2025 at 9:35 AM     Finalized by (CST): Ethan Oscar DO on 3/21/2025 at 9:36 AM       US ARTERIAL DUPLEX LOWER EXTREMITY BILATERAL (CPT=93925)    Result Date: 3/17/2025  PROCEDURE:  US ARTERIAL DUPLEX LOWER EXTREMITY BILATERAL (CPT=93925)  COMPARISON:  None.  INDICATIONS:  I70.235 Atherosclerosis of native artery of right lower extremity with ulceration of other part of foot (HCC)  TECHNIQUE:  A comprehensive  color duplex Doppler ultrasound examination of the bilateral lower extremities was performed.  Color imaging, Doppler wave form analysis, and peak systolic flow measurements of the common femoral, profunda femoral, superficial  femoral, and popliteal arteries were performed.   PATIENT STATED HISTORY: (As transcribed by Technologist)     FINDINGS:  RIGHT EXTREMITY:  Normal wave forms, no significant stenosis.  LEFT EXTREMITY:  Normal wave forms, no significant stenosis.  OTHER:  None.  SYSTOLIC VELOCITIES (CM/S): RIGHT COMMON FEMORAL:      64  RIGHT PROFUNDA FEMORAL:      80    RIGHT SUPERFICIAL FEMORAL PROX:    49 RIGHT SUPERFICIAL FEMORAL MID:    48 RIGHT SUPERFICIAL FEMORAL DISTAL:    55 RIGHT POPLITEAL PROX:      48 RIGHT POPLITEAL DISTAL:      44 TIBIOPER TRUNK:      40 RIGHT PTA PROX:      35 RIGHT PTA MID:      54 RIGHT PTA DISTAL:      51 RIGHT ANTON PROX:      42 RIGHT ANTON MID:      37  RIGHT DORSALIS PEDIS:      36 RIGHT GREAT TOE PRESSURE:      38 mmHg  SYSTOLIC VELOCITIES (CM./S): LEFT COMMON FEMORAL:      119  LEFT PROFUNDA FEMORAL:      56    LEFT SUPERFICIAL FEMORAL PROX:    45 LEFT SUPERFICIAL FEMORAL MID:      64 LEFT SUPERFICIAL FEMORAL DISTAL:    50 LEFT POPLITEAL PROX:      30 LEFT POPLITEAL DISTAL:      35 TIBIOPER TRUNK:      33 LEFT PTA PROX:      49 LEFT PTA MID:      43 LEFT PTA DISTAL:      60 LEFT ANTON PROX:      38 LEFT ANTON MID:      26  LEFT DORSALIS PEDIS::      24 LEFT GREAT TOE PRESSURE:      78 mmHg             CONCLUSION:  The visualized lower extremity arterial vasculature appears patent.   LOCATION:  [id number]   Dictated by (CST): Lee Ramirez MD on 3/17/2025 at 9:28 AM     Finalized by (CST): Lee Ramirez MD on 3/17/2025 at 9:30 AM       US VEIN MAP LOWER EXTREMITY BILAT (CPT=93970)    Result Date: 3/17/2025  PROCEDURE:  US VEIN MAP LOWER EXTREMITY RIGHT (CPT=93970)  COMPARISON:  [facility] US ARTERIAL DUPLEX LOWER EXTREMITY BILATERAL (CPT=93925), 3/17/2025, 7:24 AM.  INDICATIONS:   T82.318A Breakdown (mechanical) of other vascular grafts, initial encounter I70.235 Atherosclerosis of native artery of right lower extremity with ulceration o*  TECHNIQUE:  A comprehensive color duplex Doppler evaluation of the right lower extremity superficial and deep veins was obtained.  PATIENT STATED HISTORY: (As transcribed by Technologist)     FINDINGS:  All measurements are in mm.  ::::: GREATER SAPHENOUS VEINS::::: RIGHT: SFJ:  12 Prox Thigh:  6 Mid Thigh:  6 Above Knee:  5 Below Knee:  5 Mid Calf:  6 Distal Calf:  4 Malleolus:  3   :::::LESSER SAPHENOUS VEINS:::::: RIGHT: LSJ:  Not seen Prox Calf:  5 Mid Calf:  4 Distal Calf:  4              CONCLUSION:  Vein mapping as described above.   LOCATION:  [id number]     Dictated by (CST): Lee Ramirez MD on 3/17/2025 at 9:24 AM     Finalized by (CST): Lee Ramirez MD on 3/17/2025 at 9:28 AM         Recent Results (from the past 72 hours)   Prothrombin Time (PT)    Collection Time: 04/07/25  6:25 AM   Result Value Ref Range    PT 20.7 (H) 11.6 - 14.8 seconds    INR 1.76 (H) 0.80 - 1.20           ASSESSMENT/ PLAN:   80 year old female s/p: \"right iliofemoral, profunda femoral, and superficial femoral endarterectomy and vein patch and stent graft of the abdominal aorta and right common iliac artery\" per Dr. Hoffmann    1. Aortoiliac occlusive disease (HCC)  -s/p surgery as above.     2. Longstanding persistent atrial fibrillation (HCC)  -on coumadin, BB, watching INR, and amiodarone    3. Acute recurrent maxillary sinusitis  -stable, in remission. CPM    4. Acute right-sided low back pain with right-sided sciatica  -PT/OT and pain rx.     5. Atherosclerosis of aorta  -stable, as above. CPm    6. Atherosclerosis of native coronary artery of native heart with other form of angina pectoris  -as above. Stable, CPM    7. Chronic right-sided low back pain without sciatica  -PT/OT    8. DDD (degenerative disc disease), cervical  -PT/OT    9. Depression, major, recurrent, in  partial remission  -stable, continue with psych rx. CPm    10. Type 2 diabetes mellitus with stage 4 chronic kidney disease, without long-term current use of insulin (HCC)  -stable, CPm    11. Episodic benzodiazepine dependence (HCC)  -stable, per psych, CPM    12. Gastroesophageal reflux disease without esophagitis  -stable, CPm    13. Primary hypertension  -stable, CPM    14. At high risk for malnutrition  -dietary consults. CPM    15. Generalized weakness  -PT/OT    16. Physical deconditioning  -as above. PT/OT    17. STEFFEN (generalized anxiety disorder)  -stable, CPM    18. Major depressive disorder, recurrent episode, moderate (HCC)  -stable, CPm    19. History of pulmonary embolus (PE)  -on coumadin, watching INR. CPm    20. History of DVT (deep vein thrombosis)  -stable, on coumadin. CPM.      Kera Reed needs then following services:  Occupational Therapy  Physical Therapy  Respiratory Therapy  Wound care    I anticipate that she will need 3 weeks of therapy and recooperation before an eventual transition to home and we will work on her discharge needs.     Patient is high risk for readmission to hospital with complicated chronic medical history.   Over 45 minutes spent on reviewing imaging and labs from PCC and [facility], reviewed consults and recent hospital admission, discharge report, consults, and reviewed plan of care at SNF and discharge back to community.    Reviewed medications from hospital discharge and PCC.       Ye Godfrey MD

## 2025-04-07 NOTE — ASSESSMENT & PLAN NOTE
Baseline creat 1.0, 0.95, 0.90  Avoid nephrotoxic medications  Renal dose meds as needed  CMP weekly and prn

## 2025-04-09 ENCOUNTER — SNF VISIT (OUTPATIENT)
Dept: INTERNAL MEDICINE CLINIC | Age: 80
End: 2025-04-09

## 2025-04-09 VITALS
SYSTOLIC BLOOD PRESSURE: 118 MMHG | DIASTOLIC BLOOD PRESSURE: 74 MMHG | OXYGEN SATURATION: 96 % | TEMPERATURE: 97 F | HEART RATE: 75 BPM | WEIGHT: 157.19 LBS | BODY MASS INDEX: 29 KG/M2 | RESPIRATION RATE: 16 BRPM

## 2025-04-09 DIAGNOSIS — B35.4 TINEA CORPORIS: ICD-10-CM

## 2025-04-09 DIAGNOSIS — D68.61 ANTIPHOSPHOLIPID SYNDROME (HCC): ICD-10-CM

## 2025-04-09 DIAGNOSIS — Z78.9 DECREASED ACTIVITIES OF DAILY LIVING (ADL): ICD-10-CM

## 2025-04-09 DIAGNOSIS — I48.11 LONGSTANDING PERSISTENT ATRIAL FIBRILLATION (HCC): ICD-10-CM

## 2025-04-09 DIAGNOSIS — N18.4 STAGE 4 CHRONIC KIDNEY DISEASE (HCC): ICD-10-CM

## 2025-04-09 DIAGNOSIS — I50.32 CHRONIC HEART FAILURE WITH PRESERVED EJECTION FRACTION (HFPEF) (HCC): ICD-10-CM

## 2025-04-09 DIAGNOSIS — I74.09 AORTOILIAC OCCLUSIVE DISEASE (HCC): Primary | ICD-10-CM

## 2025-04-09 PROCEDURE — 99309 SBSQ NF CARE MODERATE MDM 30: CPT | Performed by: NURSE PRACTITIONER

## 2025-04-09 NOTE — ASSESSMENT & PLAN NOTE
S/p DCCV 12/2024  Amiodarone 200 mg daily  Metoprolol ER 50 mg daily  Rate controlled  Plans for ablation and DCCV post recovery from vascular surgery  Follow up with cardiology [facility].  Warfarin increased to 3 mg Nightly, PT/INR  repeat Thursday

## 2025-04-09 NOTE — ASSESSMENT & PLAN NOTE
PVD s/p elective stent graft of abdominal aorta and Right common iliac  S/P right iliofemoral, profunda and superficial femoral endarterectomy  Dr John Hoffmann Surgery on 3/20/2025  Follow up as directed 4/14/25 1230 - moved to 4/11/25    Wound care BID now with drainage  Augmentin BID for 10 days, EOT 4/11/25  Monitoring, no systemic signs of infection, no worsening pain   Pain managed with Norco prn

## 2025-04-09 NOTE — PROGRESS NOTES
Kera Reed, 1/7/1945, 80 year old, female    Chief Complaint:    Chief Complaint   Patient presents with    Follow - Up     S/p vascular surgery  Incisional drainage  Pain  Tinea rash        Subjective:   TODAY:  Kera is seen today getting back from therapy. She walked with her walker and SBA from the therapy gym to her room. She is feeling well. Pain continues to improve.  She is sleeping OK. Eating OK.   Therapy is helping.   DW patient DC planning. Has surgical follow up on Friday. Awaiting updated wound care instructions, will need daily extensive dressing changes now. Once simpler and can manage at home, will consider DC. She is in agreement.   DW nursing.     Denies insomnia, fatigue, fever/chills, cough, SOB, dyspnea, angina, palpitations, n/v, diarrhea, constipation, and urinary sxs.      Objective:  /74   Pulse 75   Temp 97.1 °F (36.2 °C)   Resp 16   Wt 157 lb 3.2 oz (71.3 kg)   LMP  (LMP Unknown)   SpO2 96%   BMI 28.75 kg/m²                     PHYSICAL EXAM:  GENERAL HEALTH: well developed, well nourished, in no apparent distress  LINES, TUBES, DRAINS:  none  SKIN: warm, dry, multiple bruises BUE, RUE with edema and purple bruising,   Now with circular raised erythemic rash on both lower legs with raised edges and clearing centers, continues to improve with no new areas.   WOUND: skin tears one remains on Left UE, one on right UE dressed.  LLE dressings intact  EYES: sclera anicteric, conjunctiva normal; there is no nystagmus, no drainage from eyes  HENT: normocephalic; normal nose, no nasal drainage, mucous membranes pink, moist.  NECK: supple, non tender, FROM  BREAST: deferred  RESPIRATORY:clear, no crackles, no wheezing, no dyspnea, no cough, room air  CARDIOVASCULAR: Irregular rhythm, rate normal,  S1 and S2, no murmurs, no edema  ABDOMEN:  normal active BS+, soft, nondistended; no organomegaly, no masses; nontender, no guarding, no rebound tenderness.  :Deferred  LYMPHATIC:no  lymphedema  MUSCULOSKELETAL: no acute synovitis upper or lower extremity  EXTREMITIES/VASCULAR:radial pulses 2+ and dorsalis pedal pulses 2+  NEUROLOGIC: A&OX3, no focal deficits, follows commands  PSYCHIATRIC: calm, cooperative, mood and affect appropriate to situation    Therapy Updates:   Ambulating  RW and SBA  ADLs independent except showering   Transfers independent. 2 stairs SBA  DC planning: home with spouse 14 stairs, home health care for wound care    Medications reviewed: Yes      Current Outpatient Medications:     HYDROcodone-acetaminophen 5-325 MG Oral Tab, Take 1 tablet by mouth every 4 (four) hours as needed., Disp: 40 tablet, Rfl: 0    ALPRAZolam 0.25 MG Oral Tab, Take 1 tablet (0.25 mg total) by mouth daily as needed., Disp: 20 tablet, Rfl: 0    HYDROcodone-acetaminophen 5-325 MG Oral Tab, Take 1 tablet by mouth every 8 (eight) hours as needed for Pain., Disp: 30 tablet, Rfl: 0    amiodarone 200 MG Oral Tab, Take 1 tablet (200 mg total) by mouth daily., Disp: 30 tablet, Rfl: 0    metoprolol succinate ER 50 MG Oral Tablet 24 Hr, Take 1 tablet (50 mg total) by mouth Daily Beta Blocker., Disp: 30 tablet, Rfl: 0    warfarin 2.5 MG Oral Tab, Take 1 tablet (2.5 mg total) by mouth nightly. (Patient taking differently: Take 3 mg by mouth nightly.), Disp: 30 tablet, Rfl: 0    Naloxone HCl 4 MG/0.1ML Nasal Liquid, 4 mg by Intranasal route as needed (May repeat as needed in other nostril if symptoms persist). If patient remains unresponsive, repeat dose in other nostril 2-5 minutes after first dose., Disp: 1 kit, Rfl: 0    busPIRone 10 MG Oral Tab, Take 1 tablet (10 mg total) by mouth in the morning and 1 tablet (10 mg total) before bedtime., Disp: , Rfl:     bumetanide 1 MG Oral Tab, Take 1 tablet (1 mg total) by mouth BID (Diuretic)., Disp: , Rfl:     Multiple Vitamins-Minerals (MULTIVITAMIN ADULTS 50+ OR), Take 1 tablet by mouth daily., Disp: , Rfl:     empagliflozin (JARDIANCE) 10 MG Oral Tab, Take 1  tablet (10 mg total) by mouth daily., Disp: , Rfl:     Potassium Chloride ER 20 MEQ Oral Tab CR, Take 40 mEq by mouth in the morning and 40 mEq before bedtime., Disp: , Rfl:     clopidogrel 75 MG Oral Tab, Take 1 tablet (75 mg total) by mouth daily., Disp: 90 tablet, Rfl: 1    Levothyroxine Sodium 75 MCG Oral Tab, Take 1 tablet (75 mcg total) by mouth once daily., Disp: 90 tablet, Rfl: 2      Diagnostics reviewed:      Lab Results   Component Value Date    WBC 7.6 04/03/2025    RBC 3.68 (L) 04/03/2025    HGB 11.9 (L) 04/03/2025    HCT 35.5 04/03/2025    MCV 96.5 04/03/2025    MCH 32.3 04/03/2025    MCHC 33.5 04/03/2025    RDW 16.4 04/03/2025    .0 04/03/2025     Lab Results   Component Value Date    GLU 90 04/03/2025    BUN 25 (H) 04/03/2025    CREATSERUM 0.90 04/03/2025    ANIONGAP 10 04/03/2025    GFR 43 (L) 07/17/2015    CA 9.6 04/03/2025    OSMOCALC 294 04/03/2025    ALKPHO 75 03/27/2025    AST 21 03/27/2025    ALT 11 03/27/2025    BILT 0.6 03/27/2025    TP 5.6 (L) 03/27/2025    ALB 3.6 03/27/2025    GLOBULIN 2.0 03/27/2025    AGRATIO 1.7 02/23/2016     04/03/2025    K 4.4 04/03/2025     04/03/2025    CO2 27.0 04/03/2025     Lab Results   Component Value Date    PTP 20.7 (H) 04/07/2025    INR 1.76 (H) 04/07/2025       Assessment and plan:  Assessment & Plan  Aortoiliac occlusive disease (HCC)  PVD s/p elective stent graft of abdominal aorta and Right common iliac  S/P right iliofemoral, profunda and superficial femoral endarterectomy  Dr John Daughertysh Surgery on 3/20/2025  Follow up as directed 4/14/25 1230 - moved to 4/11/25    Wound care BID now with drainage  Augmentin BID for 10 days, EOT 4/11/25  Monitoring, no systemic signs of infection, no worsening pain   Pain managed with Norco prn       Stage 4 chronic kidney disease (HCC)  Baseline creat 1.0, 0.95, 0.90  Avoid nephrotoxic medications  Renal dose meds as needed  CMP weekly and prn       Antiphospholipid syndrome (HCC)  INR  1.76  Warfarin increased to 3 mg PO at bedtime  Repeat INR on Thursday  On Augmentin now        Tinea corporis  Improving  Continue Clotrimazole BID on BLE       Chronic heart failure with preserved ejection fraction (HFpEF) ([facility])  EF 50-55 %   Daily weights; Call if weight gain of 2# overnight or 4# in 5 days  2 gram sodium diet, 2 liter/day fluid restriction  VS q shift and prn  Monitoring for edema, wt gain, shortness of breath, fatigue  Weight 149 -149.6 lbs-150 lbs    CMP weekly and prn  Follow up with cardiology in 2-4 weeks  Metoprolol ER 50 mg daily  Empagliflozin 10 mg daily  Stopped losartan at home  Bumex 1 mg BID  KCL 40 MEQ BID   K wnl now       Decreased activities of daily living (ADL)  Physical Deconditioning/Weakness; at risk for falling  Fall Precautions  PT/OT with improvement nearly independent now   DC planning with MDT, SW, therapies, anticipated DC date : TBD  Services on DC: HHC RN/PT/OT/SW  Equipment on DC: Walker        Longstanding persistent atrial fibrillation (HCC)  S/p DCCV 12/2024  Amiodarone 200 mg daily  Metoprolol ER 50 mg daily  Rate controlled  Plans for ablation and DCCV post recovery from vascular surgery  Follow up with cardiology [facility].  Warfarin increased to 3 mg Nightly, PT/INR  repeat Thursday           COPD  No shortness of breath, no wheezing today  No rx     Anxiety/depression  Mood appears appropriate to situation  Alprazolam 0.25 mg daily prn  Buspar 10 mg BID  Wellbutrin  mg daily     Obesity  BMI 30  Follow up as outpatient once cleared for regular exercise  Participate in therapies as able now  Heart healthy diet     Pain management  Monitor and assess pain  [place] 5/325 PO Q4 prn weaning down now, 2 times daily approx use  Offer to pre-medicate 30-60 min prior to therapy  Physiatry evaluation with management appreciated     Bowel management  Monitor for Bms  Senna-docusate BID  prn     Vitamins/supplements as r/t deficiencies  GEORGETTE RD to follow while in rehab;  supplementation/diet as per [facility] RD  May continue home supplements     At risk for malnutrition  Dietician consult  Weekly weights  Supplements as indicated  Encourage po intake     DVT Prophylaxis   Encourage exercise and participation with therapy as much as able  warfarin     GI prophylaxis  none     Labs  CBC, CMP, PT/INR Thursday      Follow Ups:  PCP within 7 days of DC from rehab  Dr BASIM Hoffmann CV surgery 4/14/25  now 4/11/25  ZANA Montelongo 1 month  Dr Gomez Interventional cardiology in 1-2 weeks      *Established patient; follow-up moderately complex visit/ greater than 30     32 minutes spent w/ patient and staff, including but not limited to/ reviewing present status, needs, abilities with disciplines, reviewing medical records, vital signs, labs, completing medication reconciliation and entering orders for continued care in [facility].    Note to patient: The 21st Century Cures Act makes medical notes like these available to patients in the interest of transparency. However, this is a medical document intended as peer to peer communication. It is written in medical language and may contain abbreviations or verbiage that are unfamiliar. It may appear blunt or direct. Medical documents are intended to carry relevant information, facts as evident, and the clinical opinion of the practitioner who signs the document.    Basia Iglesias, APRN  4/9/2025

## 2025-04-14 ENCOUNTER — SNF VISIT (OUTPATIENT)
Dept: INTERNAL MEDICINE CLINIC | Age: 80
End: 2025-04-14

## 2025-04-14 VITALS
BODY MASS INDEX: 28 KG/M2 | WEIGHT: 150.69 LBS | OXYGEN SATURATION: 97 % | TEMPERATURE: 98 F | HEART RATE: 59 BPM | DIASTOLIC BLOOD PRESSURE: 77 MMHG | RESPIRATION RATE: 18 BRPM | SYSTOLIC BLOOD PRESSURE: 121 MMHG

## 2025-04-14 DIAGNOSIS — B35.4 TINEA CORPORIS: ICD-10-CM

## 2025-04-14 DIAGNOSIS — N18.4 STAGE 4 CHRONIC KIDNEY DISEASE (HCC): ICD-10-CM

## 2025-04-14 DIAGNOSIS — I74.09 AORTOILIAC OCCLUSIVE DISEASE (HCC): Primary | ICD-10-CM

## 2025-04-14 DIAGNOSIS — I48.11 LONGSTANDING PERSISTENT ATRIAL FIBRILLATION (HCC): ICD-10-CM

## 2025-04-14 DIAGNOSIS — I10 PRIMARY HYPERTENSION: ICD-10-CM

## 2025-04-14 DIAGNOSIS — D68.61 ANTIPHOSPHOLIPID SYNDROME (HCC): ICD-10-CM

## 2025-04-14 DIAGNOSIS — Z78.9 DECREASED ACTIVITIES OF DAILY LIVING (ADL): ICD-10-CM

## 2025-04-14 DIAGNOSIS — I50.32 CHRONIC HEART FAILURE WITH PRESERVED EJECTION FRACTION (HFPEF) (HCC): ICD-10-CM

## 2025-04-14 NOTE — PROGRESS NOTES
Kera Reed, 1/7/1945, 80 year old, female    Chief Complaint:    Chief Complaint   Patient presents with    Follow - Up     S/p vascular surgery  Tinea brigido        Subjective:   TODAY:  Kera is seen today lying in bed. DW her incision care. Incisions are opening and with drainage. Wound care is contacting Dr Hoffmann.  She is feeling well. Pain continues to improve.  She is sleeping OK. Eating OK.   Therapy is helping.     DW patient DC planning. Needs daily extensive dressing changes now. Once simpler and can manage at home, will consider DC. She is in agreement.   DW nursing.     Denies insomnia, fatigue, fever/chills, cough, SOB, dyspnea, angina, palpitations, n/v, diarrhea, constipation, and urinary sxs.      Objective:  /77   Pulse 59   Temp 97.5 °F (36.4 °C)   Resp 18   Wt 150 lb 11.2 oz (68.4 kg)   LMP  (LMP Unknown)   SpO2 97%   BMI 27.56 kg/m²                     PHYSICAL EXAM:  GENERAL HEALTH: well developed, well nourished, in no apparent distress  LINES, TUBES, DRAINS:  none  SKIN: warm, dry, multiple bruises BUE, RUE with edema and purple bruising,   Circular raised erythemic rash on both lower legs with raised edges and clearing centers, continues to improve with no new areas.   WOUND: LLE  two incisions both pulling apart and fibrous drainage, mild erythema. Wet.  EYES: sclera anicteric, conjunctiva normal; there is no nystagmus, no drainage from eyes  HENT: normocephalic; normal nose, no nasal drainage, mucous membranes pink, moist.  NECK: supple, non tender, FROM  BREAST: deferred  RESPIRATORY:clear, no crackles, no wheezing, no dyspnea, no cough, room air  CARDIOVASCULAR: Irregular rhythm, rate normal,  S1 and S2, no murmurs, no edema  ABDOMEN:  normal active BS+, soft, nondistended; no organomegaly, no masses; nontender, no guarding, no rebound tenderness.  :Deferred  LYMPHATIC:no lymphedema  MUSCULOSKELETAL: no acute synovitis upper or lower extremity  EXTREMITIES/VASCULAR:radial  pulses 2+ and dorsalis pedal pulses 2+  NEUROLOGIC: A&OX3, no focal deficits, follows commands  PSYCHIATRIC: calm, cooperative, mood and affect appropriate to situation    Therapy Updates:   Ambulating  RW and SBA  ADLs independent except showering   Transfers independent. 2 stairs SBA  DC planning: home with spouse 14 stairs, home health care for wound care    Medications reviewed: Yes      Current Outpatient Medications:     HYDROcodone-acetaminophen 5-325 MG Oral Tab, Take 1 tablet by mouth every 4 (four) hours as needed., Disp: 40 tablet, Rfl: 0    ALPRAZolam 0.25 MG Oral Tab, Take 1 tablet (0.25 mg total) by mouth daily as needed., Disp: 20 tablet, Rfl: 0    HYDROcodone-acetaminophen 5-325 MG Oral Tab, Take 1 tablet by mouth every 8 (eight) hours as needed for Pain., Disp: 30 tablet, Rfl: 0    amiodarone 200 MG Oral Tab, Take 1 tablet (200 mg total) by mouth daily., Disp: 30 tablet, Rfl: 0    metoprolol succinate ER 50 MG Oral Tablet 24 Hr, Take 1 tablet (50 mg total) by mouth Daily Beta Blocker., Disp: 30 tablet, Rfl: 0    warfarin 2.5 MG Oral Tab, Take 1 tablet (2.5 mg total) by mouth nightly. (Patient taking differently: Take 3 mg by mouth nightly.), Disp: 30 tablet, Rfl: 0    Naloxone HCl 4 MG/0.1ML Nasal Liquid, 4 mg by Intranasal route as needed (May repeat as needed in other nostril if symptoms persist). If patient remains unresponsive, repeat dose in other nostril 2-5 minutes after first dose., Disp: 1 kit, Rfl: 0    busPIRone 10 MG Oral Tab, Take 1 tablet (10 mg total) by mouth in the morning and 1 tablet (10 mg total) before bedtime., Disp: , Rfl:     bumetanide 1 MG Oral Tab, Take 1 tablet (1 mg total) by mouth BID (Diuretic)., Disp: , Rfl:     Multiple Vitamins-Minerals (MULTIVITAMIN ADULTS 50+ OR), Take 1 tablet by mouth daily., Disp: , Rfl:     empagliflozin (JARDIANCE) 10 MG Oral Tab, Take 1 tablet (10 mg total) by mouth daily., Disp: , Rfl:     Potassium Chloride ER 20 MEQ Oral Tab CR, Take 40  mEq by mouth in the morning and 40 mEq before bedtime., Disp: , Rfl:     clopidogrel 75 MG Oral Tab, Take 1 tablet (75 mg total) by mouth daily., Disp: 90 tablet, Rfl: 1    Levothyroxine Sodium 75 MCG Oral Tab, Take 1 tablet (75 mcg total) by mouth once daily., Disp: 90 tablet, Rfl: 2      Diagnostics reviewed:      Lab Results   Component Value Date    WBC 5.6 04/10/2025    RBC 3.58 (L) 04/10/2025    HGB 11.1 (L) 04/10/2025    HCT 34.3 (L) 04/10/2025    MCV 95.8 04/10/2025    MCH 31.0 04/10/2025    MCHC 32.4 04/10/2025    RDW 15.7 04/10/2025    .0 04/10/2025     Lab Results   Component Value Date    GLU 78 04/10/2025    BUN 21 04/10/2025    CREATSERUM 0.91 04/10/2025    ANIONGAP 10 04/10/2025    GFR 43 (L) 07/17/2015    CA 9.4 04/10/2025    OSMOCALC 294 04/10/2025    ALKPHO 75 03/27/2025    AST 21 03/27/2025    ALT 11 03/27/2025    BILT 0.6 03/27/2025    TP 5.6 (L) 03/27/2025    ALB 3.6 03/27/2025    GLOBULIN 2.0 03/27/2025    AGRATIO 1.7 02/23/2016     04/10/2025    K 4.1 04/10/2025     04/10/2025    CO2 26.0 04/10/2025     Lab Results   Component Value Date    PTP 20.1 (H) 04/11/2025    INR 1.70 (H) 04/11/2025       Labs not drawn today, repeat order for tomorrow.   Assessment & Plan  Aortoiliac occlusive disease (HCC)  PVD s/p elective stent graft of abdominal aorta and Right common iliac  S/P right iliofemoral, profunda and superficial femoral endarterectomy  Dr John Hoffmann Surgery on 3/20/2025  Follow up 4/21/25  Wound care aquacell now, incision opened   Monitoring, no systemic signs of infection, no worsening pain   Pain managed with Norco prn       Stage 4 chronic kidney disease (HCC)  Baseline creat 1.0, 0.95, 0.90  Avoid nephrotoxic medications  Renal dose meds as needed  CMP weekly and prn       Antiphospholipid syndrome (HCC)  INR 1.76  Warfarin increased to 3 mg PO at bedtime  Repeat INR reordered tomorrow, not done today       Tinea corporis  Continue Clotrimazole BID on BLE        Chronic heart failure with preserved ejection fraction (HFpEF) ([facility])  Primary hypertension  EF 50-55 %   Daily weights; Call if weight gain of 2# overnight or 4# in 5 days  2 gram sodium diet, 2 liter/day fluid restriction  VS q shift and prn  Monitoring for edema, wt gain, shortness of breath, fatigue  Weight 149 -149.6 lbs-150 -150.7 lbs   CMP weekly and prn  Follow up with cardiology in 2-4 weeks  Metoprolol ER 50 mg daily  Empagliflozin 10 mg daily  Stopped losartan at home  Bumex 1 mg BID  KCL 40 MEQ BID   K wnl now         Longstanding persistent atrial fibrillation (HCC)  S/p DCCV 12/2024  Amiodarone 200 mg daily  Metoprolol ER 50 mg daily  Rate controlled, Plans for ablation and DCCV post recovery   Follow up with cardiology [facility].  Warfarin 3 mg nightly, repeat INR not done 4/13 reoedered 4/14/25       Decreased activities of daily living (ADL)  Physical Deconditioning/Weakness; at risk for falling  Fall Precautions  PT/OT with improvement nearly independent now   DC planning with MDT, SW, therapies, anticipated DC date : TBD  Services on DC: HHC RN/PT/OT/SW  Equipment on DC: Walker          COPD  No shortness of breath, no wheezing today  No rx     Anxiety/depression  Mood appears appropriate to situation  Alprazolam 0.25 mg daily prn  Buspar 10 mg BID  Wellbutrin  mg daily     Obesity  BMI 30  Follow up as outpatient once cleared for regular exercise  Participate in therapies as able now  Heart healthy diet     Pain management  Monitor and assess pain  [place] 5/325 PO Q4 prn weaning down now, 2 times daily approx use  Offer to pre-medicate 30-60 min prior to therapy  Physiatry evaluation with management appreciated     Bowel management  Monitor for Bms  Senna-docusate BID  prn     Vitamins/supplements as r/t deficiencies  GEORGETTE RD to follow while in rehab; supplementation/diet as per GEORGETTE RD  May continue home supplements     At risk for malnutrition  Dietician consult  Weekly weights  Supplements as  indicated  Encourage po intake     DVT Prophylaxis   Encourage exercise and participation with therapy as much as able  warfarin     GI prophylaxis  none     Labs  CBC, CMP, PT/INR Tuesday      Follow Ups:  PCP within 7 days of DC from rehab  Dr BASIM Hoffmann CV surgery 4/21/25  [facility] [place] 1 month  Dr Gomez Interventional cardiology in 1-2 weeks      *Established patient; follow-up moderately complex visit/ greater than 30     38 minutes spent w/ patient and staff, including but not limited to/ reviewing present status, needs, abilities with disciplines, reviewing medical records, vital signs, labs, completing medication reconciliation and entering orders for continued care in [facility].    Note to patient: The 21st Century Cures Act makes medical notes like these available to patients in the interest of transparency. However, this is a medical document intended as peer to peer communication. It is written in medical language and may contain abbreviations or verbiage that are unfamiliar. It may appear blunt or direct. Medical documents are intended to carry relevant information, facts as evident, and the clinical opinion of the practitioner who signs the document.    Basia Iglesias, APRN  4/14/2025

## 2025-04-14 NOTE — ASSESSMENT & PLAN NOTE
EF 50-55 %   Daily weights; Call if weight gain of 2# overnight or 4# in 5 days  2 gram sodium diet, 2 liter/day fluid restriction  VS q shift and prn  Monitoring for edema, wt gain, shortness of breath, fatigue  Weight 149 -149.6 lbs-150 -150.7 lbs   CMP weekly and prn  Follow up with cardiology in 2-4 weeks  Metoprolol ER 50 mg daily  Empagliflozin 10 mg daily  Stopped losartan at home  Bumex 1 mg BID  KCL 40 MEQ BID   K wnl now

## 2025-04-14 NOTE — ASSESSMENT & PLAN NOTE
PVD s/p elective stent graft of abdominal aorta and Right common iliac  S/P right iliofemoral, profunda and superficial femoral endarterectomy  Dr John Hoffmann Surgery on 3/20/2025  Follow up 4/21/25  Wound care aquacell now, incision opened   Monitoring, no systemic signs of infection, no worsening pain   Pain managed with Norco prn

## 2025-04-14 NOTE — ASSESSMENT & PLAN NOTE
S/p DCCV 12/2024  Amiodarone 200 mg daily  Metoprolol ER 50 mg daily  Rate controlled, Plans for ablation and DCCV post recovery   Follow up with cardiology [facility].  Warfarin 3 mg nightly, repeat INR not done 4/13 reoedered 4/14/25

## 2025-04-16 ENCOUNTER — SNF DISCHARGE (OUTPATIENT)
Dept: INTERNAL MEDICINE CLINIC | Age: 80
End: 2025-04-16

## 2025-04-16 VITALS
DIASTOLIC BLOOD PRESSURE: 55 MMHG | BODY MASS INDEX: 28 KG/M2 | HEART RATE: 64 BPM | RESPIRATION RATE: 15 BRPM | WEIGHT: 152.5 LBS | SYSTOLIC BLOOD PRESSURE: 91 MMHG | OXYGEN SATURATION: 97 % | TEMPERATURE: 97 F

## 2025-04-16 DIAGNOSIS — I74.09 AORTOILIAC OCCLUSIVE DISEASE (HCC): Primary | ICD-10-CM

## 2025-04-16 DIAGNOSIS — I48.11 LONGSTANDING PERSISTENT ATRIAL FIBRILLATION (HCC): ICD-10-CM

## 2025-04-16 DIAGNOSIS — I50.32 CHRONIC HEART FAILURE WITH PRESERVED EJECTION FRACTION (HFPEF) (HCC): ICD-10-CM

## 2025-04-16 DIAGNOSIS — D68.61 ANTIPHOSPHOLIPID SYNDROME (HCC): ICD-10-CM

## 2025-04-16 DIAGNOSIS — E03.9 ACQUIRED HYPOTHYROIDISM: ICD-10-CM

## 2025-04-16 DIAGNOSIS — E78.00 PURE HYPERCHOLESTEROLEMIA: ICD-10-CM

## 2025-04-16 DIAGNOSIS — B35.4 TINEA CORPORIS: ICD-10-CM

## 2025-04-16 DIAGNOSIS — I10 PRIMARY HYPERTENSION: ICD-10-CM

## 2025-04-16 DIAGNOSIS — Z78.9 DECREASED ACTIVITIES OF DAILY LIVING (ADL): ICD-10-CM

## 2025-04-16 DIAGNOSIS — N18.4 STAGE 4 CHRONIC KIDNEY DISEASE (HCC): ICD-10-CM

## 2025-04-16 PROCEDURE — 99316 NF DSCHRG MGMT 30 MIN+: CPT | Performed by: NURSE PRACTITIONER

## 2025-04-16 NOTE — ASSESSMENT & PLAN NOTE
PVD s/p elective stent graft of abdominal aorta and Right common iliac  S/P right iliofemoral, profunda and superficial femoral endarterectomy  Dr John Hoffmann Surgery on 3/20/2025  Follow up 4/21/25  Wound care aquacell now, incision opened   Monitoring, no systemic signs of infection, no worsening pain   Pain managed with [place] prn  HHC RN for wound care plus patient and family teaching

## 2025-04-16 NOTE — ASSESSMENT & PLAN NOTE
EF 50-55 %   Daily weights; Call if weight gain of 2# overnight or 4# in 5 days  2 gram sodium diet, 2 liter/day fluid restriction  VS q shift and prn  Monitoring for edema, wt gain, shortness of breath, fatigue  Weight 149 -149.6 lbs-150 -150.7-152.5 lbs  CMP weekly and prn  Follow up with cardiology in 2-4 weeks  Metoprolol ER 50 mg daily  Empagliflozin 10 mg daily  Stopped losartan at home  Bumex 1 mg BID  KCL 40 MEQ BID     S/p DCCV 12/2024  Amiodarone 200 mg daily  Metoprolol ER 50 mg daily  Rate controlled, Plans for ablation and DCCV post recovery   Follow up with cardiology [facility].  Warfarin 3.5 mg nightly, repeat INR 4/17

## 2025-04-16 NOTE — ASSESSMENT & PLAN NOTE
EF 50-55 %   Daily weights; Call if weight gain of 2# overnight or 4# in 5 days  2 gram sodium diet, 2 liter/day fluid restriction  VS q shift and prn  Monitoring for edema, wt gain, shortness of breath, fatigue  Weight 149 -149.6 lbs-150 -150.7-152.5 lbs  CMP weekly and prn  Follow up with cardiology in 2-4 weeks  Metoprolol ER 50 mg daily  Empagliflozin 10 mg daily  Stopped losartan at home  Bumex 1 mg BID  KCL 40 MEQ BID     S/p DCCV 12/2024  Amiodarone 200 mg daily  Metoprolol ER 50 mg daily  Rate controlled, Plans for ablation and DCCV post recovery   Follow up with cardiology [facility].  Warfarin 3.5 mg nightly, repeat INR 4/17

## 2025-04-16 NOTE — PROGRESS NOTES
Post-Acute Discharge Summary    Kera Reed  1/7/1945  Date of Admission to [facility]: 3/20/25  Date of Hospital Discharge: 3/26/25  Hospital Discharge Diagnosis:   PVD s/p stent graft and endarterectomy  Persistent Afib  CAD s/p CABG and PCI  CHFpEF  Severe TR  Factor V Leiden hx PE/DVT  weakness  Date of Admission to [facility] [place]: 3/26/25  Date of Discharge from SNF: 4/18/25  Skilled nursing facility attending: Dr Ye Godfrey   Primary Care Physician: Daya Chandler MD  Assessment & Plan  Aortoiliac occlusive disease (HCC)  PVD s/p elective stent graft of abdominal aorta and Right common iliac  S/P right iliofemoral, profunda and superficial femoral endarterectomy  Dr John Hoffmann Surgery on 3/20/2025  Follow up 4/21/25  Wound care aquacell now, incision opened   Monitoring, no systemic signs of infection, no worsening pain   Pain managed with [place] prn  C RN for wound care plus patient and family teaching  Stage 4 chronic kidney disease (HCC)  Baseline creat 1.0, 0.95, 0.90  Avoid nephrotoxic medications  Renal dose meds as needed  CMP weekly and prn  Antiphospholipid syndrome (HCC)  INR 3.0   Warfarin 3.5 mg PO at bedtime  Repeat INR tomorrow  Chronic heart failure with preserved ejection fraction (HFpEF) (HCC)  Primary hypertension  Longstanding persistent atrial fibrillation (HCC)  Pure hypercholesterolemia  EF 50-55 %   Daily weights; Call if weight gain of 2# overnight or 4# in 5 days  2 gram sodium diet, 2 liter/day fluid restriction  VS q shift and prn  Monitoring for edema, wt gain, shortness of breath, fatigue  Weight 149 -149.6 lbs-150 -150.7-152.5 lbs  CMP weekly and prn  Follow up with cardiology in 2-4 weeks  Metoprolol ER 50 mg daily  Empagliflozin 10 mg daily  Stopped losartan at home  Bumex 1 mg BID  KCL 40 MEQ BID     S/p DCCV 12/2024  Amiodarone 200 mg daily  Metoprolol ER 50 mg daily  Rate controlled, Plans for ablation and DCCV post recovery   Follow up with cardiology  [facility].  Warfarin 3.5 mg nightly, repeat INR 4/17       Tinea corporis  Continue Clotrimazole BID on BLE  Decreased activities of daily living (ADL)  Physical Deconditioning/Weakness; at risk for falling  Fall Precautions  PT/OT with improvement nearly independent now   DC planning with MDT, SW, therapies, anticipated DC date : 4/18/25  Services on DC: [facility] RN/PT/OT/SW  Equipment on DC: Walker  Acquired hypothyroidism  Continue levothyroxine 75 mcg daily       COPD  No shortness of breath, no wheezing   No rx     Anxiety/depression  Mood appears appropriate to situation  Alprazolam 0.25 mg daily prn  Buspar 10 mg BID  Wellbutrin  mg daily     Obesity  BMI 30, now 27.89  Follow up as outpatient once cleared for regular exercise  Participate in therapies as able now  Heart healthy diet     Pain management  Monitor and assess pain  [place] 5/325 PO Q4 prn weaning down now, 2 times daily approx use  Offer to pre-medicate 30-60 min prior to therapy  Physiatry evaluation with management appreciated     Bowel management  Monitor for BM  Senna-docusate BID  prn     Vitamins/supplements as r/t deficiencies  GEORGETTE RD to follow while in rehab; supplementation/diet as per GEORGETTE RD  May continue home supplements     At risk for malnutrition  Dietician consult  Weekly weights  Supplements as indicated  Encourage po intake     DVT Prophylaxis   Encourage exercise and participation with therapy as much as able  warfarin     GI prophylaxis  none     Labs  INR Thursday 4/17      Follow Ups:  PCP within 7 days of DC from rehab  Dr BASIM Hoffmann CV surgery 4/21/25  ZANA Montelongo 1 month  Dr Gomez Interventional cardiology in 1-2 weeks      Code Status: Full Code  Discharge to: Home with Family Home Health  Home Health Services ordered: Home Health Aide, Occupational Therapy, Physical Therapy, RN Wound Care, and   Mobility assessment: Walker    Reliable Support: spouse / significant other  Diet:Regular diet    Your appointments        Date & Time Appointment Department ([place])    Apr 21, 2025 9:20 AM CDT Post Op with John Hoffmann MD [facility] ([facility]        Apr 28, 2025 2:00 PM CDT Post Op with John Hoffmann MD [facility] ([facility])        May 13, 2025 1:00 PM CDT US ART DUPLX LWER with [facility] US RM 2 [facility] ([facility])    Please arrive 15 minutes prior to your scheduled appointment time.    There are no eating or activity restrictions for this exam.              [facility]  [facility]  [address] [place] [address]  [facility] [address]  [phone] [facility]  [facility]  [address]  [facility] [address]  [phone]          Subjective    Hospital Summary:  Kera Reed is a 80 year old female with previous medical history including significant peripheral vascular disease with discomfort of right foot, hx DVT/PE/Factor V leiden on warfarin, hx CABG. She was admitted to the hospital for vascular surgery including s/p stent, graft placement or the abdominal aorta, right common illac and endarterectomy.   Now admitted to SNF for sub-acute rehabilitation.     Skilled Nursing Facility Summary:  Kera has done well with therapies and improved in strength and abilities.  She has developed wound dehiscence and had drainage. KEISHA hoffmann is following and she has completed Augmentin now with no further drainage. Wound care will follow at home for 3x/week assist. She has follow up with vascular surgery Dr Hoffmann on Monday 4/21/25.  INR has been managed and she will need home care for PT/INR draw as well.     Medication Changes During skilled nursing facility stay:     Medication List            Accurate as of April 16, 2025 11:42 AM. If you have any questions, ask your nurse or doctor.                CHANGE how you take these medications       warfarin 2.5 MG Tabs  Commonly known as: Coumadin  Currently taking 3.5 mg nightly with INR due on 4/17/25    What changed: how much to take            CONTINUE taking these medications      ALPRAZolam 0.25 MG Tabs  Commonly known as: Xanax  Take 1 tablet (0.25 mg total) by mouth daily as needed.     amiodarone 200 MG Tabs  Commonly known as: Pacerone  Take 1 tablet (200 mg total) by mouth daily.     bumetanide 1 MG Tabs  Commonly known as: Bumex     busPIRone 10 MG Tabs  Commonly known as: Buspar     clopidogrel 75 MG Tabs  Commonly known as: Plavix  Take 1 tablet (75 mg total) by mouth daily.     * HYDROcodone-acetaminophen 5-325 MG Tabs  Commonly known as: Norco  Take 1 tablet by mouth every 8 (eight) hours as needed for Pain.     * HYDROcodone-acetaminophen 5-325 MG Tabs  Commonly known as: Norco  Take 1 tablet by mouth every 4 (four) hours as needed.     Jardiance 10 MG Tabs  Generic drug: empagliflozin     levothyroxine 75 MCG Tabs  Commonly known as: Synthroid  Take 1 tablet (75 mcg total) by mouth once daily.     metoprolol succinate ER 50 MG Tb24  Commonly known as: Toprol XL  Take 1 tablet (50 mg total) by mouth Daily Beta Blocker.     MULTIVITAMIN ADULTS 50+ OR     Naloxone HCl 4 MG/0.1ML Liqd  4 mg by Intranasal route as needed (May repeat as needed in other nostril if symptoms persist). If patient remains unresponsive, repeat dose in other nostril 2-5 minutes after first dose.     Potassium Chloride ER 20 MEQ Tbcr           * This list has 2 medication(s) that are the same as other medications prescribed for you. Read the directions carefully, and ask your doctor or other care provider to review them with you.                     Objective    Vitals  BP 91/55   Pulse 64   Temp 96.8 °F (36 °C)   Resp 15   Wt 152 lb 8 oz (69.2 kg)   LMP  (LMP Unknown)   SpO2 97%   BMI 27.89 kg/m²       Physical Exam   PHYSICAL EXAM:  GENERAL HEALTH: well developed, well nourished, in no apparent  distress  LINES, TUBES, DRAINS:  none  SKIN: warm, dry, multiple bruises BUE, RUE with edema and purple bruising,   Circular raised erythemic rash on both lower legs with raised edges and clearing centers, continues to improve with no new areas.   WOUND: LLE  two incisions both pulling apart and fibrous drainage, mild erythema. Wet.  EYES: sclera anicteric, conjunctiva normal; there is no nystagmus, no drainage from eyes  HENT: normocephalic; normal nose, no nasal drainage, mucous membranes pink, moist.  NECK: supple, non tender, FROM  BREAST: deferred  RESPIRATORY:clear, no crackles, no wheezing, no dyspnea, no cough, room air  CARDIOVASCULAR: Irregular rhythm, rate normal,  S1 and S2, no murmurs, no edema  ABDOMEN:  normal active BS+, soft, nondistended; no organomegaly, no masses; nontender, no guarding, no rebound tenderness.  :Deferred  LYMPHATIC:no lymphedema  MUSCULOSKELETAL: no acute synovitis upper or lower extremity  EXTREMITIES/VASCULAR:radial pulses 2+ and dorsalis pedal pulses 2+  NEUROLOGIC: A&OX3, no focal deficits, follows commands  PSYCHIATRIC: calm, cooperative, mood and affect appropriate to situation     Therapy Updates:   Ambulating  RW and SBA  ADLs independent except showering   Transfers independent. 2 stairs SBA  DC planning: home with spouse 14 stairs, home health care for wound care     Most Recent Labs:  Lab Results   Component Value Date    WBC 5.3 04/15/2025    RBC 3.74 (L) 04/15/2025    HGB 11.9 (L) 04/15/2025    HCT 35.8 04/15/2025    MCV 95.7 04/15/2025    MCH 31.8 04/15/2025    MCHC 33.2 04/15/2025    RDW 15.4 04/15/2025    .0 04/15/2025     Lab Results   Component Value Date    GLU 89 04/15/2025    BUN 25 (H) 04/15/2025    CREATSERUM 1.08 (H) 04/15/2025    ANIONGAP 12 04/15/2025    GFR 43 (L) 07/17/2015    CA 9.5 04/15/2025    OSMOCALC 298 (H) 04/15/2025    ALKPHO 75 03/27/2025    AST 21 03/27/2025    ALT 11 03/27/2025    BILT 0.6 03/27/2025    TP 5.6 (L) 03/27/2025    ALB  3.6 03/27/2025    GLOBULIN 2.0 03/27/2025    AGRATIO 1.7 02/23/2016     04/15/2025    K 4.2 04/15/2025     04/15/2025    CO2 25.0 04/15/2025     Lab Results   Component Value Date    PTP 32.0 (H) 04/15/2025    INR 3.07 (H) 04/15/2025           Time Spent: 35 minutes.  This includes but is not limited to direct patient care, reviewing this patient's vitals, labs and imaging studies, discussing in multidisciplinary rounds and with other members of the health care team as well as updating family members.         Basia SALEH  4/16/25

## 2025-04-16 NOTE — ASSESSMENT & PLAN NOTE
EF 50-55 %   Daily weights; Call if weight gain of 2# overnight or 4# in 5 days  2 gram sodium diet, 2 liter/day fluid restriction  VS q shift and prn  Monitoring for edema, wt gain, shortness of breath, fatigue  Weight 149 -149.6 lbs-150 -150.7-152.5 lbs  CMP weekly and prn  Follow up with cardiology in 2-4 weeks  Metoprolol ER 50 mg daily  Empagliflozin 10 mg daily  Stopped losartan at home  Bumex 1 mg BID  KCL 40 MEQ BID     S/p DCCV 12/2024  Amiodarone 200 mg daily  Metoprolol ER 50 mg daily  Rate controlled, Plans for ablation and DCCV post recovery   Follow up with cardiology [facility].  Warfarin 3.5 mg nightly, repeat INR 4/17

## 2025-04-18 RX ORDER — CEFADROXIL 500 MG/1
500 CAPSULE ORAL 2 TIMES DAILY
Qty: 14 CAPSULE | Refills: 0 | Status: SHIPPED | OUTPATIENT
Start: 2025-04-18 | End: 2025-04-25

## 2025-04-21 PROCEDURE — 87070 CULTURE OTHR SPECIMN AEROBIC: CPT | Performed by: SURGERY

## 2025-04-21 PROCEDURE — 87077 CULTURE AEROBIC IDENTIFY: CPT | Performed by: SURGERY

## 2025-04-21 PROCEDURE — 87205 SMEAR GRAM STAIN: CPT | Performed by: SURGERY

## 2025-06-20 ENCOUNTER — HOSPITAL ENCOUNTER (OUTPATIENT)
Dept: ULTRASOUND IMAGING | Facility: HOSPITAL | Age: 80
Discharge: HOME | End: 2025-06-20
Attending: SURGERY
Payer: MEDICARE

## 2025-06-20 DIAGNOSIS — I70.235 ATHEROSCLEROSIS OF NATIVE ARTERY OF RIGHT LOWER EXTREMITY WITH ULCERATION OF OTHER PART OF FOOT (HCC): ICD-10-CM

## 2025-06-20 PROCEDURE — 93925 LOWER EXTREMITY STUDY: CPT | Performed by: SURGERY

## 2025-08-20 ENCOUNTER — LAB ENCOUNTER (OUTPATIENT)
Dept: LAB | Age: 80
End: 2025-08-20
Attending: INTERNAL MEDICINE

## 2025-08-20 ENCOUNTER — TELEPHONE (OUTPATIENT)
Facility: LOCATION | Age: 80
End: 2025-08-20

## 2025-08-20 DIAGNOSIS — I48.19 PERSISTENT ATRIAL FIBRILLATION (HCC): Primary | ICD-10-CM

## 2025-08-20 DIAGNOSIS — I25.10 CORONARY ATHEROSCLEROSIS OF NATIVE CORONARY ARTERY: ICD-10-CM

## 2025-08-20 DIAGNOSIS — I73.9 PERIPHERAL VASCULAR DISEASE, UNSPECIFIED: ICD-10-CM

## 2025-08-20 DIAGNOSIS — Z95.1 POSTSURGICAL AORTOCORONARY BYPASS STATUS: ICD-10-CM

## 2025-08-20 LAB
ALBUMIN SERPL-MCNC: 4 G/DL (ref 3.2–4.8)
ALBUMIN/GLOB SERPL: 1.8 (ref 1–2)
ALP LIVER SERPL-CCNC: 97 U/L (ref 55–142)
ALT SERPL-CCNC: 21 U/L (ref 10–49)
ANION GAP SERPL CALC-SCNC: 12 MMOL/L (ref 0–18)
AST SERPL-CCNC: 20 U/L (ref ?–34)
BILIRUB SERPL-MCNC: 0.7 MG/DL (ref 0.2–1.1)
BUN BLD-MCNC: 27 MG/DL (ref 9–23)
CALCIUM BLD-MCNC: 9.3 MG/DL (ref 8.7–10.6)
CHLORIDE SERPL-SCNC: 104 MMOL/L (ref 98–112)
CO2 SERPL-SCNC: 28 MMOL/L (ref 21–32)
CREAT BLD-MCNC: 1.1 MG/DL (ref 0.55–1.02)
EGFRCR SERPLBLD CKD-EPI 2021: 50 ML/MIN/1.73M2 (ref 60–?)
FASTING STATUS PATIENT QL REPORTED: NO
GLOBULIN PLAS-MCNC: 2.2 G/DL (ref 2–3.5)
GLUCOSE BLD-MCNC: 93 MG/DL (ref 70–99)
OSMOLALITY SERPL CALC.SUM OF ELEC: 303 MOSM/KG (ref 275–295)
POTASSIUM SERPL-SCNC: 4.5 MMOL/L (ref 3.5–5.1)
PROT SERPL-MCNC: 6.2 G/DL (ref 5.7–8.2)
SODIUM SERPL-SCNC: 144 MMOL/L (ref 136–145)
T4 FREE SERPL-MCNC: 1.7 NG/DL (ref 0.8–1.7)
TSI SER-ACNC: 0.49 UIU/ML (ref 0.55–4.78)

## 2025-08-20 PROCEDURE — 84443 ASSAY THYROID STIM HORMONE: CPT

## 2025-08-20 PROCEDURE — 84439 ASSAY OF FREE THYROXINE: CPT

## 2025-08-20 PROCEDURE — 36415 COLL VENOUS BLD VENIPUNCTURE: CPT

## 2025-08-20 PROCEDURE — 80053 COMPREHEN METABOLIC PANEL: CPT

## 2025-08-22 ENCOUNTER — TELEPHONE (OUTPATIENT)
Facility: LOCATION | Age: 80
End: 2025-08-22

## (undated) DEVICE — STANDARD HYPODERMIC NEEDLE,POLYPROPYLENE HUB: Brand: MONOJECT

## (undated) DEVICE — GOWN SUR 2XL LEV 4 BLU W/ WHT V NK BND AERO

## (undated) DEVICE — RADIFOCUS GLIDEWIRE: Brand: GLIDEWIRE

## (undated) DEVICE — Device

## (undated) DEVICE — SUT PROL 7-0 24IN BV-1 NABSRB BLU 9.3MM 3/8 C

## (undated) DEVICE — 3M™ IOBAN™ 2 ANTIMICROBIAL INCISE DRAPE 6651EZ: Brand: IOBAN™ 2

## (undated) DEVICE — SUT PROL 5-0 24IN NABSRB BLU 13MM C-1 3/8

## (undated) DEVICE — PACK ANGIOGRAPHY CUSTOM

## (undated) DEVICE — 3M™ TEGADERM™ TRANSPARENT FILM DRESSING FRAME STYLE, 1626W, 4 IN X 4-3/4 IN (10 CM X 12 CM), 50/CT 4CT/CASE: Brand: 3M™ TEGADERM™

## (undated) DEVICE — SLEEVE COMPR MD KNEE LEN SGL USE KENDALL SCD

## (undated) DEVICE — SUT PROL 6-0 24IN C-1 NABSRB BLU 13MM 3/8 CIR

## (undated) DEVICE — KIT INFL DEV 20ML W/ INSRT TOOL 3 W STPCOCK

## (undated) DEVICE — PACK CV CUSTOM

## (undated) DEVICE — SUT VCRL 2-0 36IN CT-1 ABSRB UD L36MM 1/2 CIR

## (undated) DEVICE — CATHETER ANGIO 5FR L65CM GWIRE 0.038IN KMP

## (undated) DEVICE — CATHETER ANGIO 5FR L65CM 16MM CLOSER DST END PERFORMA

## (undated) DEVICE — 3M™ TEGADERM™ TRANSPARENT FILM DRESSING FRAME STYLE, 1628, 6 IN X 8 IN (15 CM X 20 CM), 10/CT 8CT/CASE: Brand: 3M™ TEGADERM™

## (undated) DEVICE — PROXIMATE SKIN STAPLERS (35 WIDE) CONTAINS 35 STAINLESS STEEL STAPLES (FIXED HEAD): Brand: PROXIMATE

## (undated) DEVICE — ANTIBACTERIAL UNDYED BRAIDED (POLYGLACTIN 910), SYNTHETIC ABSORBABLE SUTURE: Brand: COATED VICRYL

## (undated) DEVICE — SUT DEKNATEL POLYDEK 4-0 12XL30IN GRN POLY

## (undated) DEVICE — PINNACLE INTRODUCER SHEATH: Brand: PINNACLE

## (undated) DEVICE — STERILE POLYISOPRENE POWDER-FREE SURGICAL GLOVES: Brand: PROTEXIS

## (undated) DEVICE — FIXED CORE WIRE GUIDE SAFE-T-J, CURVED: Brand: COOK

## (undated) DEVICE — UNDYED BRAIDED (POLYGLACTIN 910), SYNTHETIC ABSORBABLE SUTURE: Brand: COATED VICRYL

## (undated) DEVICE — ROSEN CURVED WIRE GUIDE: Brand: ROSEN

## (undated) DEVICE — PTA BALLOON DILATATION CATHETER: Brand: MUSTANG™

## (undated) DEVICE — TRAY CATH 16FR F INCL BARDX IC COMPLT CARE

## (undated) DEVICE — X-RAY DETECTABLE SPONGES,16 PLY: Brand: VISTEC

## (undated) DEVICE — CLIP LIG M BLU TI HRT SHP WRE HORZ 600 PER BX

## (undated) DEVICE — 3M™ BAIR HUGGER® UNDERBODY BLANKET, FULL ACCESS, 10 PER CASE 63500: Brand: BAIR HUGGER™

## (undated) DEVICE — ADHESIVE SKIN TOP FOR WND CLSR DERMBND ADV

## (undated) NOTE — LETTER
Gilberto Hendricks M.D., F.A.C.S.  Jeronimo De La Torre M.D., F.A.C.S.  Anshul Vazquez M.D.   Rigo Love M.D., F.A.C.S.  BROOKE Collins M.D., F.A.C.S.   Colby Pacheco M.D., F.A.C.S.  Shahab Shultz M.D.    BROOKE Boateng M.D.   BROOKE Toth M.D., M.B.A.  Moo Howell M.D.  BROOKE Betancur M.D., F.A.C.S.  Venkata Waddell M.D., F.A.C.S.   BROOKE Carballo M.D., F.A.C.S., F.A.C.C. Eric Fletcher M.D.  Martin Siegel M.D.    RIGO La D.O., F.A.C.S.  Hussein Almanzar M.D.   Henok Wright M.D.  Slim Valadez M.D.    John Hoffmann M.D., F.A.C.S.        Welcome to [facility]    As you contemplate possible surgical treatment, it is very important to us that you understand fully what is being discussed, that all of your questions have been answered, and that your options for treatment have been fully explained.    To that end, on the following page we will ask you some questions to make certain that you understand everything which has been explained to you. Included in this understanding is that there are both surgical and nonsurgical treatments available for you, that you have options regarding where your care is given, and what doctors are involved in your care. Included in these options would, of course, be the option to elect for no treatment whatsoever. We especially want to be sure that you have had a chance to have all of your questions and concerns answered. If there are any issues which have not been adequately addressed, we ask you to bring them forward so that we can thoroughly address them.    A patient who is fully informed and understands their condition and options for treatment, as well as potential adverse effects of treatment, is going to be a patient who receives the most benefit out of care rendered.  Our goal in addition to providing excellent surgical care is to provide the necessary information to you and your family in order to make decisions which are appropriate relative to your own care.    Please take the time necessary to read and answer the questions on the next page. Again, if you have any questions, bring them forward and we will certainly address them.    Sincerely,    [facility]    Date:___________________ _______________________ _______________________       Printed Patient Name  Signature of Patient    Date:___________________ _______________________ _______________________       Printed Witness Name  Signature of Witness    _______________________       Relationship of Witness to Patient        Consent Form Page: 1 of 2  [address] [facility] * [facility] [address] * [place] [address] * [phone] / [phone] *  Fax [phone] * Billing Fax [phone] Version: 9/9/2024    [facility]  Supplemental Consent Form    A [facility] surgeon has met with me and explained the matter of my illness, and what treatments might be available to improve my condition. As a result of that conversation, I understand the following:    A [facility] surgeon met with me and explained, in detail, the nature of my condition for which surgery is being contemplated. The procedure being performed is:  RIGHT ILIOFEMORAL ENDARTERECTOMY WITH VEIN PATCH, POSSIBLE LEFT FEMORAL ENDARTERECTOMY WITH VEIN PATCH, AORTIC STENT GRAFT, RIGHT /LEFT ILIAC STENT GRAFT     Yes _____ No _____    A [facility] surgeon met with me and explained to me that there are alternatives to surgery which may include no surgery, medical therapy, or interventional treatment, among other options and the risks and benefits of the different treatment options:Yes _____ No _____    A [facility] surgeon has explained to me that if I should desire, he/she is willing to explain my case and the surgical and  non-surgical options to family members:            Yes _____ No _____    A [facility] surgeon has answered all of my questions regarding the topics we have discussed. I have been invited to ask more questions:  Yes _____ No _____    A [facility] surgeon has explained to me that if I seek other options or wish treatment at elsewhere, that his/her office will assist me in making such recommendations:  Yes _____ No _____    A [facility] surgeon has explained to me that death, risk of bleeding, stroke, multi-organ failure, heart attack and/or other complications are risks for the proposed surgical procedure:        Yes _____ No _____    A [facility] surgeon has explained to me that I have the right to cancel or postpone the surgery at any time prior to the start of surgery:    Yes _____ No _____    The nature and options for treatment for my condition has been explained to me, in detail, by a [facility] surgeon and all questions have been answered to my satisfaction. I understand that I am not required to undergo surgery, and further, that if I so desire, I could have surgery accomplished by another surgeon or at another institution. I understand and accept that which has been explained to me. I am able to make my decisions knowingly and willingly based on the data.    Date:___________________ _______________________ _______________________       Printed Patient Name  Signature of Patient    Date:___________________ _______________________ _______________________       Printed Witness Name  Signature of Witness    _______________________   Relationship of Witness to Patient          Consent Form Page: 3 of  2  [address] [facility] * [address] * [place] [address] * [phone] / [phone] *  Fax [phone] * Billing Fax [phone] Version: 9/9/2024